# Patient Record
Sex: FEMALE | Race: WHITE | NOT HISPANIC OR LATINO | Employment: OTHER | URBAN - NONMETROPOLITAN AREA
[De-identification: names, ages, dates, MRNs, and addresses within clinical notes are randomized per-mention and may not be internally consistent; named-entity substitution may affect disease eponyms.]

---

## 2018-09-22 ENCOUNTER — APPOINTMENT (OUTPATIENT)
Dept: CT IMAGING | Facility: HOSPITAL | Age: 78
End: 2018-09-22

## 2018-09-22 ENCOUNTER — ANESTHESIA (OUTPATIENT)
Dept: PERIOP | Facility: HOSPITAL | Age: 78
End: 2018-09-22

## 2018-09-22 ENCOUNTER — ANESTHESIA EVENT (OUTPATIENT)
Dept: PERIOP | Facility: HOSPITAL | Age: 78
End: 2018-09-22

## 2018-09-22 ENCOUNTER — APPOINTMENT (OUTPATIENT)
Dept: GENERAL RADIOLOGY | Facility: HOSPITAL | Age: 78
End: 2018-09-22

## 2018-09-22 ENCOUNTER — HOSPITAL ENCOUNTER (INPATIENT)
Facility: HOSPITAL | Age: 78
LOS: 6 days | Discharge: HOME | End: 2018-09-28
Attending: EMERGENCY MEDICINE | Admitting: SURGERY

## 2018-09-22 DIAGNOSIS — K81.0 ACUTE CHOLECYSTITIS: Primary | ICD-10-CM

## 2018-09-22 LAB
ALBUMIN SERPL-MCNC: 4.3 G/DL (ref 3.4–4.8)
ALBUMIN/GLOB SERPL: 1.7 G/DL (ref 1.5–2.5)
ALP SERPL-CCNC: 68 U/L (ref 35–104)
ALT SERPL W P-5'-P-CCNC: 15 U/L (ref 10–36)
AMYLASE SERPL-CCNC: 113 U/L (ref 28–100)
ANION GAP SERPL CALCULATED.3IONS-SCNC: 10.5 MMOL/L (ref 3.6–11.2)
AST SERPL-CCNC: 25 U/L (ref 10–30)
BASOPHILS # BLD AUTO: 0.02 10*3/MM3 (ref 0–0.3)
BASOPHILS NFR BLD AUTO: 0.2 % (ref 0–2)
BILIRUB SERPL-MCNC: 1.5 MG/DL (ref 0.2–1.8)
BUN BLD-MCNC: 15 MG/DL (ref 7–21)
BUN/CREAT SERPL: 20 (ref 7–25)
CALCIUM SPEC-SCNC: 9.6 MG/DL (ref 7.7–10)
CHLORIDE SERPL-SCNC: 104 MMOL/L (ref 99–112)
CO2 SERPL-SCNC: 27.5 MMOL/L (ref 24.3–31.9)
CREAT BLD-MCNC: 0.75 MG/DL (ref 0.43–1.29)
D-LACTATE SERPL-SCNC: 1.1 MMOL/L (ref 0.5–2)
D-LACTATE SERPL-SCNC: 2.4 MMOL/L (ref 0.5–2)
DEPRECATED RDW RBC AUTO: 45.1 FL (ref 37–54)
EOSINOPHIL # BLD AUTO: 0.08 10*3/MM3 (ref 0–0.7)
EOSINOPHIL NFR BLD AUTO: 0.6 % (ref 0–7)
ERYTHROCYTE [DISTWIDTH] IN BLOOD BY AUTOMATED COUNT: 13.3 % (ref 11.5–14.5)
GFR SERPL CREATININE-BSD FRML MDRD: 74 ML/MIN/1.73
GLOBULIN UR ELPH-MCNC: 2.6 GM/DL
GLUCOSE BLD-MCNC: 139 MG/DL (ref 70–110)
HCT VFR BLD AUTO: 45.6 % (ref 37–47)
HGB BLD-MCNC: 14.8 G/DL (ref 12–16)
HOLD SPECIMEN: NORMAL
IMM GRANULOCYTES # BLD: 0.03 10*3/MM3 (ref 0–0.03)
IMM GRANULOCYTES NFR BLD: 0.2 % (ref 0–0.5)
LIPASE SERPL-CCNC: 60 U/L (ref 13–60)
LYMPHOCYTES # BLD AUTO: 2.6 10*3/MM3 (ref 1–3)
LYMPHOCYTES NFR BLD AUTO: 20 % (ref 16–46)
MAGNESIUM SERPL-MCNC: 1.9 MG/DL (ref 1.7–2.6)
MCH RBC QN AUTO: 30.9 PG (ref 27–33)
MCHC RBC AUTO-ENTMCNC: 32.5 G/DL (ref 33–37)
MCV RBC AUTO: 95.2 FL (ref 80–94)
MONOCYTES # BLD AUTO: 0.46 10*3/MM3 (ref 0.1–0.9)
MONOCYTES NFR BLD AUTO: 3.5 % (ref 0–12)
NEUTROPHILS # BLD AUTO: 9.83 10*3/MM3 (ref 1.4–6.5)
NEUTROPHILS NFR BLD AUTO: 75.5 % (ref 40–75)
OSMOLALITY SERPL CALC.SUM OF ELEC: 286.2 MOSM/KG (ref 273–305)
PLATELET # BLD AUTO: 242 10*3/MM3 (ref 130–400)
PMV BLD AUTO: 11.7 FL (ref 6–10)
POTASSIUM BLD-SCNC: 3.4 MMOL/L (ref 3.5–5.3)
PROT SERPL-MCNC: 6.9 G/DL (ref 6–8)
RBC # BLD AUTO: 4.79 10*6/MM3 (ref 4.2–5.4)
SODIUM BLD-SCNC: 142 MMOL/L (ref 135–153)
T4 FREE SERPL-MCNC: 1.28 NG/DL (ref 0.89–1.76)
TROPONIN I SERPL-MCNC: <0.006 NG/ML
TSH SERPL DL<=0.05 MIU/L-ACNC: 5.54 MIU/ML (ref 0.55–4.78)
WBC NRBC COR # BLD: 13.02 10*3/MM3 (ref 4.5–12.5)

## 2018-09-22 PROCEDURE — 71045 X-RAY EXAM CHEST 1 VIEW: CPT

## 2018-09-22 PROCEDURE — 87040 BLOOD CULTURE FOR BACTERIA: CPT | Performed by: EMERGENCY MEDICINE

## 2018-09-22 PROCEDURE — 88304 TISSUE EXAM BY PATHOLOGIST: CPT | Performed by: SURGERY

## 2018-09-22 PROCEDURE — 80053 COMPREHEN METABOLIC PANEL: CPT | Performed by: EMERGENCY MEDICINE

## 2018-09-22 PROCEDURE — 99223 1ST HOSP IP/OBS HIGH 75: CPT | Performed by: SURGERY

## 2018-09-22 PROCEDURE — 99285 EMERGENCY DEPT VISIT HI MDM: CPT

## 2018-09-22 PROCEDURE — 93005 ELECTROCARDIOGRAM TRACING: CPT | Performed by: EMERGENCY MEDICINE

## 2018-09-22 PROCEDURE — 84484 ASSAY OF TROPONIN QUANT: CPT | Performed by: EMERGENCY MEDICINE

## 2018-09-22 PROCEDURE — 25010000002 NEOSTIGMINE 10 MG/10ML SOLUTION: Performed by: NURSE ANESTHETIST, CERTIFIED REGISTERED

## 2018-09-22 PROCEDURE — 25010000002 ONDANSETRON PER 1 MG: Performed by: NURSE ANESTHETIST, CERTIFIED REGISTERED

## 2018-09-22 PROCEDURE — 83605 ASSAY OF LACTIC ACID: CPT | Performed by: EMERGENCY MEDICINE

## 2018-09-22 PROCEDURE — 74177 CT ABD & PELVIS W/CONTRAST: CPT

## 2018-09-22 PROCEDURE — 25010000002 MORPHINE PER 10 MG: Performed by: EMERGENCY MEDICINE

## 2018-09-22 PROCEDURE — 85025 COMPLETE CBC W/AUTO DIFF WBC: CPT | Performed by: EMERGENCY MEDICINE

## 2018-09-22 PROCEDURE — 74177 CT ABD & PELVIS W/CONTRAST: CPT | Performed by: RADIOLOGY

## 2018-09-22 PROCEDURE — 71045 X-RAY EXAM CHEST 1 VIEW: CPT | Performed by: RADIOLOGY

## 2018-09-22 PROCEDURE — 25010000002 HYDROMORPHONE PER 4 MG

## 2018-09-22 PROCEDURE — 0FT44ZZ RESECTION OF GALLBLADDER, PERCUTANEOUS ENDOSCOPIC APPROACH: ICD-10-PCS | Performed by: SURGERY

## 2018-09-22 PROCEDURE — 71275 CT ANGIOGRAPHY CHEST: CPT | Performed by: RADIOLOGY

## 2018-09-22 PROCEDURE — 83735 ASSAY OF MAGNESIUM: CPT | Performed by: EMERGENCY MEDICINE

## 2018-09-22 PROCEDURE — 25010000002 PROMETHAZINE PER 50 MG: Performed by: EMERGENCY MEDICINE

## 2018-09-22 PROCEDURE — 47562 LAPAROSCOPIC CHOLECYSTECTOMY: CPT | Performed by: SURGERY

## 2018-09-22 PROCEDURE — 71275 CT ANGIOGRAPHY CHEST: CPT

## 2018-09-22 PROCEDURE — 84443 ASSAY THYROID STIM HORMONE: CPT | Performed by: EMERGENCY MEDICINE

## 2018-09-22 PROCEDURE — 84439 ASSAY OF FREE THYROXINE: CPT | Performed by: EMERGENCY MEDICINE

## 2018-09-22 PROCEDURE — 25010000002 FENTANYL CITRATE (PF) 100 MCG/2ML SOLUTION: Performed by: NURSE ANESTHETIST, CERTIFIED REGISTERED

## 2018-09-22 PROCEDURE — 82150 ASSAY OF AMYLASE: CPT | Performed by: EMERGENCY MEDICINE

## 2018-09-22 PROCEDURE — 25010000002 ONDANSETRON PER 1 MG: Performed by: EMERGENCY MEDICINE

## 2018-09-22 PROCEDURE — 25010000002 PROPOFOL 10 MG/ML EMULSION: Performed by: NURSE ANESTHETIST, CERTIFIED REGISTERED

## 2018-09-22 PROCEDURE — 94799 UNLISTED PULMONARY SVC/PX: CPT

## 2018-09-22 PROCEDURE — 83690 ASSAY OF LIPASE: CPT | Performed by: EMERGENCY MEDICINE

## 2018-09-22 PROCEDURE — 0 IOPAMIDOL PER 1 ML: Performed by: EMERGENCY MEDICINE

## 2018-09-22 RX ORDER — METOPROLOL SUCCINATE 50 MG/1
100 TABLET, EXTENDED RELEASE ORAL DAILY
Status: DISCONTINUED | OUTPATIENT
Start: 2018-09-23 | End: 2018-09-28 | Stop reason: HOSPADM

## 2018-09-22 RX ORDER — HYDROCHLOROTHIAZIDE 25 MG/1
12.5 TABLET ORAL DAILY
COMMUNITY

## 2018-09-22 RX ORDER — ONDANSETRON 2 MG/ML
INJECTION INTRAMUSCULAR; INTRAVENOUS AS NEEDED
Status: DISCONTINUED | OUTPATIENT
Start: 2018-09-22 | End: 2018-09-22 | Stop reason: SURG

## 2018-09-22 RX ORDER — ROCURONIUM BROMIDE 10 MG/ML
INJECTION, SOLUTION INTRAVENOUS AS NEEDED
Status: DISCONTINUED | OUTPATIENT
Start: 2018-09-22 | End: 2018-09-22 | Stop reason: SURG

## 2018-09-22 RX ORDER — NEOSTIGMINE METHYLSULFATE 1 MG/ML
INJECTION, SOLUTION INTRAVENOUS AS NEEDED
Status: DISCONTINUED | OUTPATIENT
Start: 2018-09-22 | End: 2018-09-22 | Stop reason: SURG

## 2018-09-22 RX ORDER — MULTIPLE VITAMINS W/ MINERALS TAB 9MG-400MCG
1 TAB ORAL DAILY
COMMUNITY

## 2018-09-22 RX ORDER — SODIUM CHLORIDE, SODIUM LACTATE, POTASSIUM CHLORIDE, CALCIUM CHLORIDE 600; 310; 30; 20 MG/100ML; MG/100ML; MG/100ML; MG/100ML
100 INJECTION, SOLUTION INTRAVENOUS CONTINUOUS
Status: DISCONTINUED | OUTPATIENT
Start: 2018-09-22 | End: 2018-09-24

## 2018-09-22 RX ORDER — FENTANYL CITRATE 50 UG/ML
INJECTION, SOLUTION INTRAMUSCULAR; INTRAVENOUS AS NEEDED
Status: DISCONTINUED | OUTPATIENT
Start: 2018-09-22 | End: 2018-09-22 | Stop reason: SURG

## 2018-09-22 RX ORDER — NALOXONE HCL 0.4 MG/ML
0.4 VIAL (ML) INJECTION
Status: DISCONTINUED | OUTPATIENT
Start: 2018-09-22 | End: 2018-09-28 | Stop reason: HOSPADM

## 2018-09-22 RX ORDER — ATORVASTATIN CALCIUM 10 MG/1
10 TABLET, FILM COATED ORAL DAILY
Status: DISCONTINUED | OUTPATIENT
Start: 2018-09-23 | End: 2018-09-28 | Stop reason: HOSPADM

## 2018-09-22 RX ORDER — IBUPROFEN 800 MG/1
800 TABLET ORAL EVERY 4 HOURS PRN
Status: DISCONTINUED | OUTPATIENT
Start: 2018-09-22 | End: 2018-09-23

## 2018-09-22 RX ORDER — SODIUM CHLORIDE 0.9 % (FLUSH) 0.9 %
1-10 SYRINGE (ML) INJECTION AS NEEDED
Status: DISCONTINUED | OUTPATIENT
Start: 2018-09-22 | End: 2018-09-22 | Stop reason: HOSPADM

## 2018-09-22 RX ORDER — MORPHINE SULFATE 2 MG/ML
1 INJECTION, SOLUTION INTRAMUSCULAR; INTRAVENOUS EVERY 4 HOURS PRN
Status: DISCONTINUED | OUTPATIENT
Start: 2018-09-22 | End: 2018-09-28 | Stop reason: HOSPADM

## 2018-09-22 RX ORDER — IPRATROPIUM BROMIDE AND ALBUTEROL SULFATE 2.5; .5 MG/3ML; MG/3ML
3 SOLUTION RESPIRATORY (INHALATION) ONCE AS NEEDED
Status: DISCONTINUED | OUTPATIENT
Start: 2018-09-22 | End: 2018-09-22 | Stop reason: HOSPADM

## 2018-09-22 RX ORDER — LIDOCAINE HYDROCHLORIDE 20 MG/ML
INJECTION, SOLUTION EPIDURAL; INFILTRATION; INTRACAUDAL; PERINEURAL AS NEEDED
Status: DISCONTINUED | OUTPATIENT
Start: 2018-09-22 | End: 2018-09-22 | Stop reason: SURG

## 2018-09-22 RX ORDER — BUPIVACAINE HYDROCHLORIDE AND EPINEPHRINE 5; 5 MG/ML; UG/ML
INJECTION, SOLUTION EPIDURAL; INTRACAUDAL; PERINEURAL AS NEEDED
Status: DISCONTINUED | OUTPATIENT
Start: 2018-09-22 | End: 2018-09-22 | Stop reason: HOSPADM

## 2018-09-22 RX ORDER — FAMOTIDINE 10 MG/ML
INJECTION, SOLUTION INTRAVENOUS AS NEEDED
Status: DISCONTINUED | OUTPATIENT
Start: 2018-09-22 | End: 2018-09-22 | Stop reason: SURG

## 2018-09-22 RX ORDER — ONDANSETRON 2 MG/ML
4 INJECTION INTRAMUSCULAR; INTRAVENOUS ONCE
Status: COMPLETED | OUTPATIENT
Start: 2018-09-22 | End: 2018-09-22

## 2018-09-22 RX ORDER — HYDROMORPHONE HYDROCHLORIDE 1 MG/ML
0.5 INJECTION, SOLUTION INTRAMUSCULAR; INTRAVENOUS; SUBCUTANEOUS ONCE
Status: DISCONTINUED | OUTPATIENT
Start: 2018-09-22 | End: 2018-09-28 | Stop reason: HOSPADM

## 2018-09-22 RX ORDER — MORPHINE SULFATE 2 MG/ML
2 INJECTION, SOLUTION INTRAMUSCULAR; INTRAVENOUS ONCE
Status: COMPLETED | OUTPATIENT
Start: 2018-09-22 | End: 2018-09-22

## 2018-09-22 RX ORDER — MORPHINE SULFATE 2 MG/ML
2 INJECTION, SOLUTION INTRAMUSCULAR; INTRAVENOUS
Status: DISCONTINUED | OUTPATIENT
Start: 2018-09-22 | End: 2018-09-28 | Stop reason: HOSPADM

## 2018-09-22 RX ORDER — HYDROCODONE BITARTRATE AND ACETAMINOPHEN 5; 325 MG/1; MG/1
1 TABLET ORAL EVERY 6 HOURS PRN
Status: DISCONTINUED | OUTPATIENT
Start: 2018-09-22 | End: 2018-09-28 | Stop reason: HOSPADM

## 2018-09-22 RX ORDER — HYDROMORPHONE HCL 110MG/55ML
PATIENT CONTROLLED ANALGESIA SYRINGE INTRAVENOUS
Status: COMPLETED
Start: 2018-09-22 | End: 2018-09-22

## 2018-09-22 RX ORDER — ONDANSETRON 2 MG/ML
4 INJECTION INTRAMUSCULAR; INTRAVENOUS EVERY 6 HOURS PRN
Status: DISCONTINUED | OUTPATIENT
Start: 2018-09-22 | End: 2018-09-24

## 2018-09-22 RX ORDER — MEPERIDINE HYDROCHLORIDE 50 MG/ML
12.5 INJECTION INTRAMUSCULAR; INTRAVENOUS; SUBCUTANEOUS
Status: DISCONTINUED | OUTPATIENT
Start: 2018-09-22 | End: 2018-09-22 | Stop reason: HOSPADM

## 2018-09-22 RX ORDER — OXYCODONE HYDROCHLORIDE AND ACETAMINOPHEN 5; 325 MG/1; MG/1
1 TABLET ORAL ONCE AS NEEDED
Status: DISCONTINUED | OUTPATIENT
Start: 2018-09-22 | End: 2018-09-22 | Stop reason: HOSPADM

## 2018-09-22 RX ORDER — FENTANYL CITRATE 50 UG/ML
50 INJECTION, SOLUTION INTRAMUSCULAR; INTRAVENOUS
Status: DISCONTINUED | OUTPATIENT
Start: 2018-09-22 | End: 2018-09-22 | Stop reason: HOSPADM

## 2018-09-22 RX ORDER — METOPROLOL SUCCINATE 100 MG/1
100 TABLET, EXTENDED RELEASE ORAL DAILY
COMMUNITY

## 2018-09-22 RX ORDER — SODIUM CHLORIDE 0.9 % (FLUSH) 0.9 %
1-10 SYRINGE (ML) INJECTION AS NEEDED
Status: DISCONTINUED | OUTPATIENT
Start: 2018-09-22 | End: 2018-09-28 | Stop reason: HOSPADM

## 2018-09-22 RX ORDER — PRAVASTATIN SODIUM 40 MG
40 TABLET ORAL NIGHTLY
COMMUNITY

## 2018-09-22 RX ORDER — PROPOFOL 10 MG/ML
VIAL (ML) INTRAVENOUS AS NEEDED
Status: DISCONTINUED | OUTPATIENT
Start: 2018-09-22 | End: 2018-09-22 | Stop reason: SURG

## 2018-09-22 RX ORDER — MAGNESIUM HYDROXIDE 1200 MG/15ML
LIQUID ORAL AS NEEDED
Status: DISCONTINUED | OUTPATIENT
Start: 2018-09-22 | End: 2018-09-22 | Stop reason: HOSPADM

## 2018-09-22 RX ORDER — SODIUM CHLORIDE, SODIUM LACTATE, POTASSIUM CHLORIDE, CALCIUM CHLORIDE 600; 310; 30; 20 MG/100ML; MG/100ML; MG/100ML; MG/100ML
125 INJECTION, SOLUTION INTRAVENOUS CONTINUOUS
Status: DISCONTINUED | OUTPATIENT
Start: 2018-09-22 | End: 2018-09-24

## 2018-09-22 RX ORDER — HYDROCHLOROTHIAZIDE 25 MG/1
12.5 TABLET ORAL DAILY
Status: DISCONTINUED | OUTPATIENT
Start: 2018-09-23 | End: 2018-09-28 | Stop reason: HOSPADM

## 2018-09-22 RX ORDER — MELATONIN
1400 DAILY
COMMUNITY

## 2018-09-22 RX ORDER — SODIUM CHLORIDE 9 MG/ML
INJECTION, SOLUTION INTRAVENOUS AS NEEDED
Status: DISCONTINUED | OUTPATIENT
Start: 2018-09-22 | End: 2018-09-22 | Stop reason: HOSPADM

## 2018-09-22 RX ORDER — SODIUM CHLORIDE 9 MG/ML
125 INJECTION, SOLUTION INTRAVENOUS CONTINUOUS
Status: DISCONTINUED | OUTPATIENT
Start: 2018-09-22 | End: 2018-09-24

## 2018-09-22 RX ORDER — PANTOPRAZOLE SODIUM 40 MG/10ML
40 INJECTION, POWDER, LYOPHILIZED, FOR SOLUTION INTRAVENOUS ONCE
Status: COMPLETED | OUTPATIENT
Start: 2018-09-22 | End: 2018-09-22

## 2018-09-22 RX ORDER — MORPHINE SULFATE 2 MG/ML
2 INJECTION, SOLUTION INTRAMUSCULAR; INTRAVENOUS ONCE
Status: DISCONTINUED | OUTPATIENT
Start: 2018-09-22 | End: 2018-09-22

## 2018-09-22 RX ADMIN — SODIUM CHLORIDE, POTASSIUM CHLORIDE, SODIUM LACTATE AND CALCIUM CHLORIDE 125 ML/HR: 600; 310; 30; 20 INJECTION, SOLUTION INTRAVENOUS at 23:40

## 2018-09-22 RX ADMIN — IOPAMIDOL 100 ML: 755 INJECTION, SOLUTION INTRAVENOUS at 17:59

## 2018-09-22 RX ADMIN — PANTOPRAZOLE SODIUM 40 MG: 40 INJECTION, POWDER, FOR SOLUTION INTRAVENOUS at 15:58

## 2018-09-22 RX ADMIN — FAMOTIDINE 20 MG: 10 INJECTION, SOLUTION INTRAVENOUS at 21:26

## 2018-09-22 RX ADMIN — PROPOFOL 120 MG: 10 INJECTION, EMULSION INTRAVENOUS at 21:32

## 2018-09-22 RX ADMIN — SODIUM CHLORIDE 125 ML/HR: 9 INJECTION, SOLUTION INTRAVENOUS at 15:58

## 2018-09-22 RX ADMIN — SODIUM CHLORIDE 500 ML: 9 INJECTION, SOLUTION INTRAVENOUS at 18:03

## 2018-09-22 RX ADMIN — ONDANSETRON 4 MG: 2 INJECTION INTRAMUSCULAR; INTRAVENOUS at 15:58

## 2018-09-22 RX ADMIN — LIDOCAINE HYDROCHLORIDE 100 MG: 20 INJECTION, SOLUTION EPIDURAL; INFILTRATION; INTRACAUDAL; PERINEURAL at 21:32

## 2018-09-22 RX ADMIN — ROCURONIUM BROMIDE 20 MG: 10 INJECTION INTRAVENOUS at 21:32

## 2018-09-22 RX ADMIN — PROMETHAZINE HYDROCHLORIDE 6.25 MG: 25 INJECTION INTRAMUSCULAR; INTRAVENOUS at 17:24

## 2018-09-22 RX ADMIN — SODIUM CHLORIDE, POTASSIUM CHLORIDE, SODIUM LACTATE AND CALCIUM CHLORIDE 100 ML/HR: 600; 310; 30; 20 INJECTION, SOLUTION INTRAVENOUS at 20:33

## 2018-09-22 RX ADMIN — METRONIDAZOLE 500 MG: 500 INJECTION, SOLUTION INTRAVENOUS at 19:04

## 2018-09-22 RX ADMIN — HYDROMORPHONE HYDROCHLORIDE 0.5 MG: 2 INJECTION, SOLUTION INTRAMUSCULAR; INTRAVENOUS; SUBCUTANEOUS at 17:24

## 2018-09-22 RX ADMIN — FENTANYL CITRATE 50 MCG: 50 INJECTION INTRAMUSCULAR; INTRAVENOUS at 21:34

## 2018-09-22 RX ADMIN — FENTANYL CITRATE 50 MCG: 50 INJECTION INTRAMUSCULAR; INTRAVENOUS at 21:42

## 2018-09-22 RX ADMIN — ONDANSETRON 4 MG: 2 INJECTION, SOLUTION INTRAMUSCULAR; INTRAVENOUS at 21:26

## 2018-09-22 RX ADMIN — MORPHINE SULFATE 2 MG: 2 INJECTION, SOLUTION INTRAMUSCULAR; INTRAVENOUS at 15:58

## 2018-09-22 RX ADMIN — NEOSTIGMINE METHYLSULFATE 3 MG: 1 INJECTION, SOLUTION INTRAVENOUS at 22:21

## 2018-09-23 LAB
ALBUMIN SERPL-MCNC: 3.3 G/DL (ref 3.4–4.8)
ALBUMIN/GLOB SERPL: 1.4 G/DL (ref 1.5–2.5)
ALP SERPL-CCNC: 49 U/L (ref 35–104)
ALT SERPL W P-5'-P-CCNC: 26 U/L (ref 10–36)
ANION GAP SERPL CALCULATED.3IONS-SCNC: 6.3 MMOL/L (ref 3.6–11.2)
AST SERPL-CCNC: 35 U/L (ref 10–30)
BACTERIA UR QL AUTO: ABNORMAL /HPF
BILIRUB SERPL-MCNC: 2 MG/DL (ref 0.2–1.8)
BILIRUB UR QL STRIP: NEGATIVE
BUN BLD-MCNC: 10 MG/DL (ref 7–21)
BUN/CREAT SERPL: 14.5 (ref 7–25)
CALCIUM SPEC-SCNC: 8.1 MG/DL (ref 7.7–10)
CHLORIDE SERPL-SCNC: 107 MMOL/L (ref 99–112)
CLARITY UR: CLEAR
CO2 SERPL-SCNC: 26.7 MMOL/L (ref 24.3–31.9)
COLOR UR: YELLOW
CREAT BLD-MCNC: 0.69 MG/DL (ref 0.43–1.29)
GFR SERPL CREATININE-BSD FRML MDRD: 82 ML/MIN/1.73
GLOBULIN UR ELPH-MCNC: 2.4 GM/DL
GLUCOSE BLD-MCNC: 155 MG/DL (ref 70–110)
GLUCOSE UR STRIP-MCNC: NEGATIVE MG/DL
HGB UR QL STRIP.AUTO: NEGATIVE
HYALINE CASTS UR QL AUTO: ABNORMAL /LPF
KETONES UR QL STRIP: NEGATIVE
LEUKOCYTE ESTERASE UR QL STRIP.AUTO: ABNORMAL
NITRITE UR QL STRIP: NEGATIVE
OSMOLALITY SERPL CALC.SUM OF ELEC: 281.6 MOSM/KG (ref 273–305)
PH UR STRIP.AUTO: <=5 [PH] (ref 5–8)
POTASSIUM BLD-SCNC: 3.4 MMOL/L (ref 3.5–5.3)
PROT SERPL-MCNC: 5.7 G/DL (ref 6–8)
PROT UR QL STRIP: NEGATIVE
RBC # UR: ABNORMAL /HPF
REF LAB TEST METHOD: ABNORMAL
SODIUM BLD-SCNC: 140 MMOL/L (ref 135–153)
SP GR UR STRIP: 1.01 (ref 1–1.03)
SQUAMOUS #/AREA URNS HPF: ABNORMAL /HPF
UROBILINOGEN UR QL STRIP: ABNORMAL
WBC UR QL AUTO: ABNORMAL /HPF

## 2018-09-23 PROCEDURE — 81001 URINALYSIS AUTO W/SCOPE: CPT | Performed by: EMERGENCY MEDICINE

## 2018-09-23 PROCEDURE — 99024 POSTOP FOLLOW-UP VISIT: CPT | Performed by: SURGERY

## 2018-09-23 PROCEDURE — 80053 COMPREHEN METABOLIC PANEL: CPT | Performed by: SURGERY

## 2018-09-23 RX ORDER — ACETAMINOPHEN 325 MG/1
650 TABLET ORAL EVERY 6 HOURS PRN
Status: DISCONTINUED | OUTPATIENT
Start: 2018-09-23 | End: 2018-09-28 | Stop reason: HOSPADM

## 2018-09-23 RX ADMIN — HYDROCODONE BITARTRATE AND ACETAMINOPHEN 1 TABLET: 5; 325 TABLET ORAL at 18:21

## 2018-09-23 RX ADMIN — METRONIDAZOLE 500 MG: 500 INJECTION, SOLUTION INTRAVENOUS at 10:58

## 2018-09-23 RX ADMIN — SODIUM CHLORIDE, POTASSIUM CHLORIDE, SODIUM LACTATE AND CALCIUM CHLORIDE 125 ML/HR: 600; 310; 30; 20 INJECTION, SOLUTION INTRAVENOUS at 02:40

## 2018-09-23 RX ADMIN — SODIUM CHLORIDE, POTASSIUM CHLORIDE, SODIUM LACTATE AND CALCIUM CHLORIDE 125 ML/HR: 600; 310; 30; 20 INJECTION, SOLUTION INTRAVENOUS at 10:58

## 2018-09-23 RX ADMIN — SODIUM CHLORIDE, POTASSIUM CHLORIDE, SODIUM LACTATE AND CALCIUM CHLORIDE 100 ML/HR: 600; 310; 30; 20 INJECTION, SOLUTION INTRAVENOUS at 21:56

## 2018-09-23 RX ADMIN — HYDROCHLOROTHIAZIDE 12.5 MG: 25 TABLET ORAL at 08:13

## 2018-09-23 RX ADMIN — METRONIDAZOLE 500 MG: 500 INJECTION, SOLUTION INTRAVENOUS at 18:10

## 2018-09-23 RX ADMIN — METOPROLOL SUCCINATE 100 MG: 50 TABLET, FILM COATED, EXTENDED RELEASE ORAL at 08:14

## 2018-09-23 RX ADMIN — METRONIDAZOLE 500 MG: 500 INJECTION, SOLUTION INTRAVENOUS at 02:40

## 2018-09-23 RX ADMIN — ATORVASTATIN CALCIUM 10 MG: 10 TABLET, FILM COATED ORAL at 08:14

## 2018-09-23 NOTE — ANESTHESIA POSTPROCEDURE EVALUATION
Patient: Dyana Nails    Procedure Summary     Date:  09/22/18 Room / Location:  [facility] OR  /  [facility] OR    Anesthesia Start:  2126 Anesthesia Stop:  2227    Procedure:  CHOLECYSTECTOMY LAPAROSCOPIC (N/A Abdomen) Diagnosis:       Acute cholecystitis      (Acute cholecystitis [K81.0])    Surgeon:  Jerzy Bowie MD Provider:  Phi Burgos MD    Anesthesia Type:  general ASA Status:  2          Anesthesia Type: general  Last vitals  BP   140/72 (09/22/18 2243)   Temp   97.8 °F (36.6 °C) (09/22/18 2228)   Pulse   85 (09/22/18 2243)   Resp   14 (09/22/18 2243)     SpO2   95 % (09/22/18 2243)     Post Anesthesia Care and Evaluation    Patient location during evaluation: PACU  Patient participation: complete - patient participated  Level of consciousness: awake and alert  Pain score: 1  Pain management: adequate  Airway patency: patent  Anesthetic complications: No anesthetic complications  PONV Status: controlled  Cardiovascular status: acceptable  Respiratory status: acceptable  Hydration status: acceptable

## 2018-09-23 NOTE — ANESTHESIA PREPROCEDURE EVALUATION
Anesthesia Evaluation     Patient summary reviewed and Nursing notes reviewed   no history of anesthetic complications:  NPO Solid Status: > 8 hours  NPO Liquid Status: > 8 hours           Airway   Mallampati: II  TM distance: >3 FB  Neck ROM: full  No difficulty expected  Dental - normal exam   (+) lower dentures and upper dentures    Pulmonary - normal exam   (+) a smoker Former,   Cardiovascular - normal exam  Exercise tolerance: good (4-7 METS)    ECG reviewed  Patient on routine beta blocker and Beta blocker given within 24 hours of surgery    (+) hypertension, past MI  >12 months, hyperlipidemia,     ROS comment: EKG NSR, sees cardiologist (Dr. Armenta in [place], last saw in June 2018), good functional capacity, able to walk several flights of stairs without chest pain or SOB    Neuro/Psych- negative ROS  GI/Hepatic/Renal/Endo - negative ROS     Musculoskeletal (-) negative ROS    Abdominal  - normal exam    Bowel sounds: normal.   Substance History - negative use     OB/GYN negative ob/gyn ROS         Other                      Anesthesia Plan    ASA 2     general     intravenous induction   Anesthetic plan, all risks, benefits, and alternatives have been provided, discussed and informed consent has been obtained with: patient.    Plan discussed with CRNA.

## 2018-09-23 NOTE — ANESTHESIA PROCEDURE NOTES
Airway  Urgency: elective    Date/Time: 9/22/2018 9:26 PM  Airway not difficult    General Information and Staff    Patient location during procedure: OR  Anesthesiologist: OTILIA BROWN  CRNA: ARLINE AGARWAL    Indications and Patient Condition  Indications for airway management: airway protection    Preoxygenated: yes  MILS not maintained throughout  Mask difficulty assessment: 0 - not attempted    Final Airway Details  Final airway type: endotracheal airway      Successful airway: ETT  Cuffed: yes   Successful intubation technique: direct laryngoscopy  Endotracheal tube insertion site: oral  Blade: [place]y  Blade size: 2  ETT size: 7.5 mm  Cormack-Lehane Classification: grade I - full view of glottis  Placement verified by: chest auscultation and capnometry   Measured from: lips  ETT to lips (cm): 22  Number of attempts at approach: 1    Additional Comments  Atraumatic ETT placement, dentition unchanged.

## 2018-09-24 LAB
ALBUMIN SERPL-MCNC: 3 G/DL (ref 3.4–4.8)
ALBUMIN/GLOB SERPL: 1.7 G/DL (ref 1.5–2.5)
ALP SERPL-CCNC: 54 U/L (ref 35–104)
ALT SERPL W P-5'-P-CCNC: 24 U/L (ref 10–36)
ANION GAP SERPL CALCULATED.3IONS-SCNC: 4.4 MMOL/L (ref 3.6–11.2)
APTT PPP: 35.2 SECONDS (ref 23.8–36.1)
AST SERPL-CCNC: 30 U/L (ref 10–30)
BASOPHILS # BLD AUTO: 0.01 10*3/MM3 (ref 0–0.3)
BASOPHILS NFR BLD AUTO: 0.1 % (ref 0–2)
BILIRUB CONJ SERPL-MCNC: 0.9 MG/DL (ref 0–0.2)
BILIRUB INDIRECT SERPL-MCNC: 1.5 MG/DL
BILIRUB SERPL-MCNC: 2.2 MG/DL (ref 0.2–1.8)
BILIRUB SERPL-MCNC: 2.4 MG/DL (ref 0.2–1.8)
BUN BLD-MCNC: 11 MG/DL (ref 7–21)
BUN/CREAT SERPL: 18.6 (ref 7–25)
CALCIUM SPEC-SCNC: 7.7 MG/DL (ref 7.7–10)
CHLORIDE SERPL-SCNC: 106 MMOL/L (ref 99–112)
CK MB SERPL-CCNC: 8.44 NG/ML (ref 0–5)
CK MB SERPL-RTO: 3.6 % (ref 0–3)
CK SERPL-CCNC: 233 U/L (ref 24–173)
CO2 SERPL-SCNC: 28.6 MMOL/L (ref 24.3–31.9)
CREAT BLD-MCNC: 0.59 MG/DL (ref 0.43–1.29)
DEPRECATED RDW RBC AUTO: 49.9 FL (ref 37–54)
EOSINOPHIL # BLD AUTO: 0.02 10*3/MM3 (ref 0–0.7)
EOSINOPHIL NFR BLD AUTO: 0.1 % (ref 0–7)
ERYTHROCYTE [DISTWIDTH] IN BLOOD BY AUTOMATED COUNT: 14.1 % (ref 11.5–14.5)
GFR SERPL CREATININE-BSD FRML MDRD: 98 ML/MIN/1.73
GLOBULIN UR ELPH-MCNC: 1.8 GM/DL
GLUCOSE BLD-MCNC: 103 MG/DL (ref 70–110)
HCT VFR BLD AUTO: 42.6 % (ref 37–47)
HGB BLD-MCNC: 13.3 G/DL (ref 12–16)
IMM GRANULOCYTES # BLD: 0.04 10*3/MM3 (ref 0–0.03)
IMM GRANULOCYTES NFR BLD: 0.2 % (ref 0–0.5)
INR PPP: 1.45 (ref 0.9–1.1)
LYMPHOCYTES # BLD AUTO: 1.12 10*3/MM3 (ref 1–3)
LYMPHOCYTES NFR BLD AUTO: 6.7 % (ref 16–46)
MCH RBC QN AUTO: 30.9 PG (ref 27–33)
MCHC RBC AUTO-ENTMCNC: 31.2 G/DL (ref 33–37)
MCV RBC AUTO: 98.8 FL (ref 80–94)
MONOCYTES # BLD AUTO: 0.96 10*3/MM3 (ref 0.1–0.9)
MONOCYTES NFR BLD AUTO: 5.8 % (ref 0–12)
MYOGLOBIN SERPL-MCNC: 379 NG/ML (ref 0–109)
NEUTROPHILS # BLD AUTO: 14.46 10*3/MM3 (ref 1.4–6.5)
NEUTROPHILS NFR BLD AUTO: 87.1 % (ref 40–75)
OSMOLALITY SERPL CALC.SUM OF ELEC: 277.2 MOSM/KG (ref 273–305)
PLATELET # BLD AUTO: 228 10*3/MM3 (ref 130–400)
PMV BLD AUTO: 12.3 FL (ref 6–10)
POTASSIUM BLD-SCNC: 3.4 MMOL/L (ref 3.5–5.3)
PROT SERPL-MCNC: 4.8 G/DL (ref 6–8)
PROTHROMBIN TIME: 17.9 SECONDS (ref 11–15.4)
RBC # BLD AUTO: 4.31 10*6/MM3 (ref 4.2–5.4)
SODIUM BLD-SCNC: 139 MMOL/L (ref 135–153)
TROPONIN I SERPL-MCNC: 0.07 NG/ML
WBC NRBC COR # BLD: 16.61 10*3/MM3 (ref 4.5–12.5)

## 2018-09-24 PROCEDURE — 94799 UNLISTED PULMONARY SVC/PX: CPT

## 2018-09-24 PROCEDURE — 82550 ASSAY OF CK (CPK): CPT | Performed by: SURGERY

## 2018-09-24 PROCEDURE — 99222 1ST HOSP IP/OBS MODERATE 55: CPT | Performed by: INTERNAL MEDICINE

## 2018-09-24 PROCEDURE — 99024 POSTOP FOLLOW-UP VISIT: CPT | Performed by: SURGERY

## 2018-09-24 PROCEDURE — 25010000002 ONDANSETRON PER 1 MG: Performed by: SURGERY

## 2018-09-24 PROCEDURE — 25010000002 DIGOXIN PER 500 MCG: Performed by: INTERNAL MEDICINE

## 2018-09-24 PROCEDURE — 82247 BILIRUBIN TOTAL: CPT | Performed by: SURGERY

## 2018-09-24 PROCEDURE — 85610 PROTHROMBIN TIME: CPT | Performed by: INTERNAL MEDICINE

## 2018-09-24 PROCEDURE — 82248 BILIRUBIN DIRECT: CPT | Performed by: SURGERY

## 2018-09-24 PROCEDURE — 93010 ELECTROCARDIOGRAM REPORT: CPT | Performed by: INTERNAL MEDICINE

## 2018-09-24 PROCEDURE — 84484 ASSAY OF TROPONIN QUANT: CPT | Performed by: SURGERY

## 2018-09-24 PROCEDURE — 85025 COMPLETE CBC W/AUTO DIFF WBC: CPT | Performed by: INTERNAL MEDICINE

## 2018-09-24 PROCEDURE — 93005 ELECTROCARDIOGRAM TRACING: CPT | Performed by: SURGERY

## 2018-09-24 PROCEDURE — 80053 COMPREHEN METABOLIC PANEL: CPT | Performed by: SURGERY

## 2018-09-24 PROCEDURE — 83874 ASSAY OF MYOGLOBIN: CPT | Performed by: SURGERY

## 2018-09-24 PROCEDURE — 85730 THROMBOPLASTIN TIME PARTIAL: CPT | Performed by: INTERNAL MEDICINE

## 2018-09-24 PROCEDURE — 82553 CREATINE MB FRACTION: CPT | Performed by: SURGERY

## 2018-09-24 RX ORDER — DIGOXIN 0.25 MG/ML
250 INJECTION INTRAMUSCULAR; INTRAVENOUS ONCE
Status: COMPLETED | OUTPATIENT
Start: 2018-09-24 | End: 2018-09-24

## 2018-09-24 RX ORDER — HEPARIN SODIUM 5000 [USP'U]/ML
30 INJECTION, SOLUTION INTRAVENOUS; SUBCUTANEOUS AS NEEDED
Status: DISCONTINUED | OUTPATIENT
Start: 2018-09-24 | End: 2018-09-26

## 2018-09-24 RX ORDER — HEPARIN SODIUM 10000 [USP'U]/100ML
12 INJECTION, SOLUTION INTRAVENOUS
Status: DISCONTINUED | OUTPATIENT
Start: 2018-09-24 | End: 2018-09-26

## 2018-09-24 RX ORDER — HEPARIN SODIUM 5000 [USP'U]/ML
60 INJECTION, SOLUTION INTRAVENOUS; SUBCUTANEOUS AS NEEDED
Status: DISCONTINUED | OUTPATIENT
Start: 2018-09-24 | End: 2018-09-26

## 2018-09-24 RX ORDER — ONDANSETRON 2 MG/ML
4 INJECTION INTRAMUSCULAR; INTRAVENOUS EVERY 4 HOURS PRN
Status: DISCONTINUED | OUTPATIENT
Start: 2018-09-24 | End: 2018-09-28 | Stop reason: HOSPADM

## 2018-09-24 RX ADMIN — DIGOXIN 250 MCG: 0.25 INJECTION INTRAMUSCULAR; INTRAVENOUS at 21:26

## 2018-09-24 RX ADMIN — HYDROCHLOROTHIAZIDE 12.5 MG: 25 TABLET ORAL at 08:24

## 2018-09-24 RX ADMIN — ONDANSETRON 4 MG: 2 INJECTION INTRAMUSCULAR; INTRAVENOUS at 16:35

## 2018-09-24 RX ADMIN — METRONIDAZOLE 500 MG: 500 INJECTION, SOLUTION INTRAVENOUS at 18:09

## 2018-09-24 RX ADMIN — METRONIDAZOLE 500 MG: 500 INJECTION, SOLUTION INTRAVENOUS at 11:13

## 2018-09-24 RX ADMIN — DIGOXIN 250 MCG: 0.25 INJECTION INTRAMUSCULAR; INTRAVENOUS at 22:02

## 2018-09-24 RX ADMIN — METRONIDAZOLE 500 MG: 500 INJECTION, SOLUTION INTRAVENOUS at 02:49

## 2018-09-24 RX ADMIN — ATORVASTATIN CALCIUM 10 MG: 10 TABLET, FILM COATED ORAL at 08:24

## 2018-09-24 RX ADMIN — ACETAMINOPHEN 650 MG: 325 TABLET ORAL at 08:52

## 2018-09-24 RX ADMIN — DILTIAZEM HYDROCHLORIDE 5 MG/HR: 5 INJECTION INTRAVENOUS at 20:28

## 2018-09-24 RX ADMIN — METOPROLOL SUCCINATE 100 MG: 50 TABLET, FILM COATED, EXTENDED RELEASE ORAL at 08:23

## 2018-09-25 ENCOUNTER — APPOINTMENT (OUTPATIENT)
Dept: CARDIOLOGY | Facility: HOSPITAL | Age: 78
End: 2018-09-25
Attending: INTERNAL MEDICINE

## 2018-09-25 LAB
ALBUMIN SERPL-MCNC: 3.1 G/DL (ref 3.4–4.8)
ALBUMIN/GLOB SERPL: 1.3 G/DL (ref 1.5–2.5)
ALP SERPL-CCNC: 92 U/L (ref 35–104)
ALT SERPL W P-5'-P-CCNC: 21 U/L (ref 10–36)
ANION GAP SERPL CALCULATED.3IONS-SCNC: 11 MMOL/L (ref 3.6–11.2)
APTT PPP: 36.4 SECONDS (ref 23.8–36.1)
APTT PPP: 45.6 SECONDS (ref 23.8–36.1)
APTT PPP: 58 SECONDS (ref 23.8–36.1)
AST SERPL-CCNC: 27 U/L (ref 10–30)
BASOPHILS # BLD AUTO: 0.01 10*3/MM3 (ref 0–0.3)
BASOPHILS NFR BLD AUTO: 0.1 % (ref 0–2)
BH CV ECHO MEAS - AO MAX PG (FULL): 4 MMHG
BH CV ECHO MEAS - AO MAX PG: 8.6 MMHG
BH CV ECHO MEAS - AO MEAN PG (FULL): 2.8 MMHG
BH CV ECHO MEAS - AO MEAN PG: 5.2 MMHG
BH CV ECHO MEAS - AO ROOT AREA (BSA CORRECTED): 1.3
BH CV ECHO MEAS - AO ROOT AREA: 4.8 CM^2
BH CV ECHO MEAS - AO ROOT DIAM: 2.5 CM
BH CV ECHO MEAS - AO V2 MAX: 146.6 CM/SEC
BH CV ECHO MEAS - AO V2 MEAN: 110.7 CM/SEC
BH CV ECHO MEAS - AO V2 VTI: 24.2 CM
BH CV ECHO MEAS - BSA(HAYCOCK): 1.9 M^2
BH CV ECHO MEAS - BSA: 1.9 M^2
BH CV ECHO MEAS - BZI_BMI: 28.6 KILOGRAMS/M^2
BH CV ECHO MEAS - BZI_METRIC_HEIGHT: 165.1 CM
BH CV ECHO MEAS - BZI_METRIC_WEIGHT: 78 KG
BH CV ECHO MEAS - EDV(CUBED): 57.4 ML
BH CV ECHO MEAS - EDV(MOD-SP2): 38 ML
BH CV ECHO MEAS - EDV(MOD-SP4): 22 ML
BH CV ECHO MEAS - EDV(TEICH): 64.2 ML
BH CV ECHO MEAS - EF(CUBED): 65.1 %
BH CV ECHO MEAS - EF(MOD-SP2): 55.3 %
BH CV ECHO MEAS - EF(MOD-SP4): 40.9 %
BH CV ECHO MEAS - EF(TEICH): 57.3 %
BH CV ECHO MEAS - ESV(CUBED): 20 ML
BH CV ECHO MEAS - ESV(MOD-SP2): 17 ML
BH CV ECHO MEAS - ESV(MOD-SP4): 13 ML
BH CV ECHO MEAS - ESV(TEICH): 27.4 ML
BH CV ECHO MEAS - FS: 29.6 %
BH CV ECHO MEAS - IVS/LVPW: 0.81
BH CV ECHO MEAS - IVSD: 0.76 CM
BH CV ECHO MEAS - LA DIMENSION: 4.4 CM
BH CV ECHO MEAS - LA/AO: 1.4
BH CV ECHO MEAS - LV DIASTOLIC VOL/BSA (35-75): 11.9 ML/M^2
BH CV ECHO MEAS - LV MASS(C)D: 95 GRAMS
BH CV ECHO MEAS - LV MASS(C)DI: 51.2 GRAMS/M^2
BH CV ECHO MEAS - LV MAX PG: 4.6 MMHG
BH CV ECHO MEAS - LV MEAN PG: 2.4 MMHG
BH CV ECHO MEAS - LV SYSTOLIC VOL/BSA (12-30): 7 ML/M^2
BH CV ECHO MEAS - LV V1 MAX: 107.4 CM/SEC
BH CV ECHO MEAS - LV V1 MEAN: 72.5 CM/SEC
BH CV ECHO MEAS - LV V1 VTI: 17.6 CM
BH CV ECHO MEAS - LVIDD: 3.9 CM
BH CV ECHO MEAS - LVIDS: 2.7 CM
BH CV ECHO MEAS - LVLD AP2: 5.4 CM
BH CV ECHO MEAS - LVLD AP4: 5.3 CM
BH CV ECHO MEAS - LVLS AP2: 4.7 CM
BH CV ECHO MEAS - LVLS AP4: 4.6 CM
BH CV ECHO MEAS - LVPWD: 0.93 CM
BH CV ECHO MEAS - MV A MAX VEL: 92.8 CM/SEC
BH CV ECHO MEAS - MV E MAX VEL: 77.5 CM/SEC
BH CV ECHO MEAS - MV E/A: 0.84
BH CV ECHO MEAS - PA ACC SLOPE: 532.7 CM/SEC^2
BH CV ECHO MEAS - PA ACC TIME: 0.11 SEC
BH CV ECHO MEAS - PA MAX PG: 3.4 MMHG
BH CV ECHO MEAS - PA MEAN PG: 1.8 MMHG
BH CV ECHO MEAS - PA PR(ACCEL): 31.5 MMHG
BH CV ECHO MEAS - PA V2 MAX: 92.3 CM/SEC
BH CV ECHO MEAS - PA V2 MEAN: 62.6 CM/SEC
BH CV ECHO MEAS - PA V2 VTI: 14.5 CM
BH CV ECHO MEAS - RAP SYSTOLE: 10 MMHG
BH CV ECHO MEAS - RVDD: 3.1 CM
BH CV ECHO MEAS - RVSP: 42.5 MMHG
BH CV ECHO MEAS - SI(AO): 61.9 ML/M^2
BH CV ECHO MEAS - SI(CUBED): 20.1 ML/M^2
BH CV ECHO MEAS - SI(MOD-SP2): 11.3 ML/M^2
BH CV ECHO MEAS - SI(MOD-SP4): 4.9 ML/M^2
BH CV ECHO MEAS - SI(TEICH): 19.8 ML/M^2
BH CV ECHO MEAS - SV(AO): 114.8 ML
BH CV ECHO MEAS - SV(CUBED): 37.4 ML
BH CV ECHO MEAS - SV(MOD-SP2): 21 ML
BH CV ECHO MEAS - SV(MOD-SP4): 9 ML
BH CV ECHO MEAS - SV(TEICH): 36.8 ML
BH CV ECHO MEAS - TR MAX VEL: 285 CM/SEC
BILIRUB SERPL-MCNC: 1.7 MG/DL (ref 0.2–1.8)
BUN BLD-MCNC: 14 MG/DL (ref 7–21)
BUN/CREAT SERPL: 23.7 (ref 7–25)
CALCIUM SPEC-SCNC: 8.3 MG/DL (ref 7.7–10)
CHLORIDE SERPL-SCNC: 104 MMOL/L (ref 99–112)
CO2 SERPL-SCNC: 23 MMOL/L (ref 24.3–31.9)
CREAT BLD-MCNC: 0.59 MG/DL (ref 0.43–1.29)
DEPRECATED RDW RBC AUTO: 49.8 FL (ref 37–54)
EOSINOPHIL # BLD AUTO: 0.1 10*3/MM3 (ref 0–0.7)
EOSINOPHIL NFR BLD AUTO: 0.6 % (ref 0–7)
ERYTHROCYTE [DISTWIDTH] IN BLOOD BY AUTOMATED COUNT: 14.1 % (ref 11.5–14.5)
GFR SERPL CREATININE-BSD FRML MDRD: 98 ML/MIN/1.73
GLOBULIN UR ELPH-MCNC: 2.3 GM/DL
GLUCOSE BLD-MCNC: 105 MG/DL (ref 70–110)
HCT VFR BLD AUTO: 40.6 % (ref 37–47)
HGB BLD-MCNC: 12.9 G/DL (ref 12–16)
IMM GRANULOCYTES # BLD: 0.04 10*3/MM3 (ref 0–0.03)
IMM GRANULOCYTES NFR BLD: 0.3 % (ref 0–0.5)
INR PPP: 1.36 (ref 0.9–1.1)
LYMPHOCYTES # BLD AUTO: 1.38 10*3/MM3 (ref 1–3)
LYMPHOCYTES NFR BLD AUTO: 8.9 % (ref 16–46)
MAXIMAL PREDICTED HEART RATE: 140 BPM
MCH RBC QN AUTO: 30.7 PG (ref 27–33)
MCHC RBC AUTO-ENTMCNC: 31.8 G/DL (ref 33–37)
MCV RBC AUTO: 96.7 FL (ref 80–94)
MONOCYTES # BLD AUTO: 0.96 10*3/MM3 (ref 0.1–0.9)
MONOCYTES NFR BLD AUTO: 6.2 % (ref 0–12)
NEUTROPHILS # BLD AUTO: 13.03 10*3/MM3 (ref 1.4–6.5)
NEUTROPHILS NFR BLD AUTO: 83.9 % (ref 40–75)
NRBC BLD MANUAL-RTO: 0 /100 WBC (ref 0–0)
OSMOLALITY SERPL CALC.SUM OF ELEC: 276.5 MOSM/KG (ref 273–305)
PLATELET # BLD AUTO: 209 10*3/MM3 (ref 130–400)
PMV BLD AUTO: 11.9 FL (ref 6–10)
POTASSIUM BLD-SCNC: 3.4 MMOL/L (ref 3.5–5.3)
PROT SERPL-MCNC: 5.4 G/DL (ref 6–8)
PROTHROMBIN TIME: 17.1 SECONDS (ref 11–15.4)
RBC # BLD AUTO: 4.2 10*6/MM3 (ref 4.2–5.4)
SODIUM BLD-SCNC: 138 MMOL/L (ref 135–153)
STRESS TARGET HR: 119 BPM
TROPONIN I SERPL-MCNC: 0.16 NG/ML
TROPONIN I SERPL-MCNC: 0.24 NG/ML
TSH SERPL DL<=0.05 MIU/L-ACNC: 0.87 MIU/ML (ref 0.55–4.78)
WBC NRBC COR # BLD: 15.52 10*3/MM3 (ref 4.5–12.5)

## 2018-09-25 PROCEDURE — 84443 ASSAY THYROID STIM HORMONE: CPT | Performed by: INTERNAL MEDICINE

## 2018-09-25 PROCEDURE — 85610 PROTHROMBIN TIME: CPT | Performed by: PHYSICIAN ASSISTANT

## 2018-09-25 PROCEDURE — 93005 ELECTROCARDIOGRAM TRACING: CPT | Performed by: INTERNAL MEDICINE

## 2018-09-25 PROCEDURE — 80053 COMPREHEN METABOLIC PANEL: CPT | Performed by: SURGERY

## 2018-09-25 PROCEDURE — 93306 TTE W/DOPPLER COMPLETE: CPT

## 2018-09-25 PROCEDURE — 84484 ASSAY OF TROPONIN QUANT: CPT | Performed by: INTERNAL MEDICINE

## 2018-09-25 PROCEDURE — 93306 TTE W/DOPPLER COMPLETE: CPT | Performed by: INTERNAL MEDICINE

## 2018-09-25 PROCEDURE — 85730 THROMBOPLASTIN TIME PARTIAL: CPT | Performed by: INTERNAL MEDICINE

## 2018-09-25 PROCEDURE — 25010000002 HEPARIN (PORCINE) PER 1000 UNITS: Performed by: INTERNAL MEDICINE

## 2018-09-25 PROCEDURE — 94799 UNLISTED PULMONARY SVC/PX: CPT

## 2018-09-25 PROCEDURE — 99232 SBSQ HOSP IP/OBS MODERATE 35: CPT | Performed by: INTERNAL MEDICINE

## 2018-09-25 PROCEDURE — 93010 ELECTROCARDIOGRAM REPORT: CPT | Performed by: INTERNAL MEDICINE

## 2018-09-25 PROCEDURE — 85730 THROMBOPLASTIN TIME PARTIAL: CPT | Performed by: PHYSICIAN ASSISTANT

## 2018-09-25 PROCEDURE — 99024 POSTOP FOLLOW-UP VISIT: CPT | Performed by: SURGERY

## 2018-09-25 PROCEDURE — 85025 COMPLETE CBC W/AUTO DIFF WBC: CPT | Performed by: PHYSICIAN ASSISTANT

## 2018-09-25 RX ADMIN — DILTIAZEM HYDROCHLORIDE 15 MG/HR: 5 INJECTION INTRAVENOUS at 00:42

## 2018-09-25 RX ADMIN — HEPARIN SODIUM 3900 UNITS: 5000 INJECTION, SOLUTION INTRAVENOUS; SUBCUTANEOUS at 10:17

## 2018-09-25 RX ADMIN — HEPARIN SODIUM 12 UNITS/KG/HR: 10000 INJECTION, SOLUTION INTRAVENOUS at 10:17

## 2018-09-25 RX ADMIN — METOPROLOL SUCCINATE 100 MG: 50 TABLET, FILM COATED, EXTENDED RELEASE ORAL at 08:57

## 2018-09-25 RX ADMIN — METRONIDAZOLE 500 MG: 500 INJECTION, SOLUTION INTRAVENOUS at 03:56

## 2018-09-25 RX ADMIN — ATORVASTATIN CALCIUM 10 MG: 10 TABLET, FILM COATED ORAL at 08:57

## 2018-09-25 RX ADMIN — HYDROCHLOROTHIAZIDE 12.5 MG: 25 TABLET ORAL at 08:57

## 2018-09-25 RX ADMIN — DILTIAZEM HYDROCHLORIDE 10 MG/HR: 5 INJECTION INTRAVENOUS at 12:29

## 2018-09-25 RX ADMIN — METRONIDAZOLE 500 MG: 500 INJECTION, SOLUTION INTRAVENOUS at 17:40

## 2018-09-25 RX ADMIN — METRONIDAZOLE 500 MG: 500 INJECTION, SOLUTION INTRAVENOUS at 10:08

## 2018-09-26 LAB
ANION GAP SERPL CALCULATED.3IONS-SCNC: 5.3 MMOL/L (ref 3.6–11.2)
APTT PPP: 44.8 SECONDS (ref 23.8–36.1)
APTT PPP: 88.3 SECONDS (ref 23.8–36.1)
BASOPHILS # BLD AUTO: 0.02 10*3/MM3 (ref 0–0.3)
BASOPHILS NFR BLD AUTO: 0.2 % (ref 0–2)
BUN BLD-MCNC: 11 MG/DL (ref 7–21)
BUN/CREAT SERPL: 23.9 (ref 7–25)
CALCIUM SPEC-SCNC: 7.8 MG/DL (ref 7.7–10)
CHLORIDE SERPL-SCNC: 101 MMOL/L (ref 99–112)
CO2 SERPL-SCNC: 26.7 MMOL/L (ref 24.3–31.9)
CREAT BLD-MCNC: 0.46 MG/DL (ref 0.43–1.29)
DEPRECATED RDW RBC AUTO: 47 FL (ref 37–54)
EOSINOPHIL # BLD AUTO: 0.15 10*3/MM3 (ref 0–0.7)
EOSINOPHIL NFR BLD AUTO: 1.2 % (ref 0–7)
ERYTHROCYTE [DISTWIDTH] IN BLOOD BY AUTOMATED COUNT: 13.7 % (ref 11.5–14.5)
GFR SERPL CREATININE-BSD FRML MDRD: 131 ML/MIN/1.73
GLUCOSE BLD-MCNC: 109 MG/DL (ref 70–110)
HCT VFR BLD AUTO: 39.6 % (ref 37–47)
HGB BLD-MCNC: 12.6 G/DL (ref 12–16)
IMM GRANULOCYTES # BLD: 0.02 10*3/MM3 (ref 0–0.03)
IMM GRANULOCYTES NFR BLD: 0.2 % (ref 0–0.5)
LAB AP CASE REPORT: NORMAL
LYMPHOCYTES # BLD AUTO: 1.55 10*3/MM3 (ref 1–3)
LYMPHOCYTES NFR BLD AUTO: 11.9 % (ref 16–46)
MAGNESIUM SERPL-MCNC: 2.5 MG/DL (ref 1.7–2.6)
MCH RBC QN AUTO: 30.7 PG (ref 27–33)
MCHC RBC AUTO-ENTMCNC: 31.8 G/DL (ref 33–37)
MCV RBC AUTO: 96.6 FL (ref 80–94)
MONOCYTES # BLD AUTO: 1.44 10*3/MM3 (ref 0.1–0.9)
MONOCYTES NFR BLD AUTO: 11.1 % (ref 0–12)
NEUTROPHILS # BLD AUTO: 9.84 10*3/MM3 (ref 1.4–6.5)
NEUTROPHILS NFR BLD AUTO: 75.4 % (ref 40–75)
OSMOLALITY SERPL CALC.SUM OF ELEC: 266.4 MOSM/KG (ref 273–305)
PATH REPORT.FINAL DX SPEC: NORMAL
PLATELET # BLD AUTO: 221 10*3/MM3 (ref 130–400)
PMV BLD AUTO: 11.6 FL (ref 6–10)
POTASSIUM BLD-SCNC: 3.4 MMOL/L (ref 3.5–5.3)
RBC # BLD AUTO: 4.1 10*6/MM3 (ref 4.2–5.4)
SODIUM BLD-SCNC: 133 MMOL/L (ref 135–153)
WBC NRBC COR # BLD: 13.02 10*3/MM3 (ref 4.5–12.5)

## 2018-09-26 PROCEDURE — 80048 BASIC METABOLIC PNL TOTAL CA: CPT | Performed by: INTERNAL MEDICINE

## 2018-09-26 PROCEDURE — 93005 ELECTROCARDIOGRAM TRACING: CPT | Performed by: SURGERY

## 2018-09-26 PROCEDURE — 94799 UNLISTED PULMONARY SVC/PX: CPT

## 2018-09-26 PROCEDURE — 25010000002 HEPARIN (PORCINE) PER 1000 UNITS: Performed by: INTERNAL MEDICINE

## 2018-09-26 PROCEDURE — 99232 SBSQ HOSP IP/OBS MODERATE 35: CPT | Performed by: INTERNAL MEDICINE

## 2018-09-26 PROCEDURE — 85730 THROMBOPLASTIN TIME PARTIAL: CPT | Performed by: INTERNAL MEDICINE

## 2018-09-26 PROCEDURE — 83735 ASSAY OF MAGNESIUM: CPT | Performed by: INTERNAL MEDICINE

## 2018-09-26 PROCEDURE — 85025 COMPLETE CBC W/AUTO DIFF WBC: CPT | Performed by: INTERNAL MEDICINE

## 2018-09-26 PROCEDURE — 99024 POSTOP FOLLOW-UP VISIT: CPT | Performed by: SURGERY

## 2018-09-26 PROCEDURE — 93010 ELECTROCARDIOGRAM REPORT: CPT | Performed by: INTERNAL MEDICINE

## 2018-09-26 RX ORDER — POTASSIUM CHLORIDE 1.5 G/1.77G
40 POWDER, FOR SOLUTION ORAL AS NEEDED
Status: DISCONTINUED | OUTPATIENT
Start: 2018-09-26 | End: 2018-09-28 | Stop reason: HOSPADM

## 2018-09-26 RX ORDER — DILTIAZEM HYDROCHLORIDE 180 MG/1
180 CAPSULE, COATED, EXTENDED RELEASE ORAL
Status: DISCONTINUED | OUTPATIENT
Start: 2018-09-26 | End: 2018-09-26 | Stop reason: SDUPTHER

## 2018-09-26 RX ORDER — POTASSIUM CHLORIDE 20 MEQ/1
40 TABLET, EXTENDED RELEASE ORAL EVERY 4 HOURS
Status: COMPLETED | OUTPATIENT
Start: 2018-09-26 | End: 2018-09-26

## 2018-09-26 RX ORDER — DILTIAZEM HYDROCHLORIDE 180 MG/1
180 CAPSULE, COATED, EXTENDED RELEASE ORAL
Status: DISCONTINUED | OUTPATIENT
Start: 2018-09-26 | End: 2018-09-28

## 2018-09-26 RX ORDER — POTASSIUM CHLORIDE 750 MG/1
40 CAPSULE, EXTENDED RELEASE ORAL AS NEEDED
Status: DISCONTINUED | OUTPATIENT
Start: 2018-09-26 | End: 2018-09-28 | Stop reason: HOSPADM

## 2018-09-26 RX ADMIN — DILTIAZEM HYDROCHLORIDE 10 MG/HR: 5 INJECTION INTRAVENOUS at 11:42

## 2018-09-26 RX ADMIN — METRONIDAZOLE 500 MG: 500 INJECTION, SOLUTION INTRAVENOUS at 02:47

## 2018-09-26 RX ADMIN — POTASSIUM CHLORIDE 40 MEQ: 1500 TABLET, EXTENDED RELEASE ORAL at 23:57

## 2018-09-26 RX ADMIN — HEPARIN SODIUM 18 UNITS/KG/HR: 10000 INJECTION, SOLUTION INTRAVENOUS at 05:50

## 2018-09-26 RX ADMIN — APIXABAN 5 MG: 5 TABLET, FILM COATED ORAL at 20:17

## 2018-09-26 RX ADMIN — DILTIAZEM HYDROCHLORIDE 180 MG: 180 CAPSULE, EXTENDED RELEASE ORAL at 12:38

## 2018-09-26 RX ADMIN — HYDROCODONE BITARTRATE AND ACETAMINOPHEN 1 TABLET: 5; 325 TABLET ORAL at 03:56

## 2018-09-26 RX ADMIN — APIXABAN 5 MG: 5 TABLET, FILM COATED ORAL at 12:39

## 2018-09-26 RX ADMIN — HEPARIN SODIUM 3900 UNITS: 5000 INJECTION, SOLUTION INTRAVENOUS; SUBCUTANEOUS at 05:50

## 2018-09-26 RX ADMIN — ATORVASTATIN CALCIUM 10 MG: 10 TABLET, FILM COATED ORAL at 08:07

## 2018-09-26 RX ADMIN — METOPROLOL SUCCINATE 100 MG: 50 TABLET, FILM COATED, EXTENDED RELEASE ORAL at 08:07

## 2018-09-26 RX ADMIN — POTASSIUM CHLORIDE 40 MEQ: 1500 TABLET, EXTENDED RELEASE ORAL at 20:17

## 2018-09-26 RX ADMIN — DILTIAZEM HYDROCHLORIDE 10 MG/HR: 5 INJECTION INTRAVENOUS at 02:35

## 2018-09-26 RX ADMIN — HYDROCHLOROTHIAZIDE 12.5 MG: 25 TABLET ORAL at 08:07

## 2018-09-27 LAB
ALBUMIN SERPL-MCNC: 3.1 G/DL (ref 3.4–4.8)
ALBUMIN/GLOB SERPL: 1.5 G/DL (ref 1.5–2.5)
ALP SERPL-CCNC: 75 U/L (ref 35–104)
ALT SERPL W P-5'-P-CCNC: 17 U/L (ref 10–36)
ANION GAP SERPL CALCULATED.3IONS-SCNC: 6.9 MMOL/L (ref 3.6–11.2)
AST SERPL-CCNC: 18 U/L (ref 10–30)
BACTERIA SPEC AEROBE CULT: NORMAL
BACTERIA SPEC AEROBE CULT: NORMAL
BASOPHILS # BLD AUTO: 0.01 10*3/MM3 (ref 0–0.3)
BASOPHILS NFR BLD AUTO: 0.1 % (ref 0–2)
BILIRUB SERPL-MCNC: 0.9 MG/DL (ref 0.2–1.8)
BUN BLD-MCNC: 11 MG/DL (ref 7–21)
BUN/CREAT SERPL: 25.6 (ref 7–25)
CALCIUM SPEC-SCNC: 8.1 MG/DL (ref 7.7–10)
CHLORIDE SERPL-SCNC: 103 MMOL/L (ref 99–112)
CO2 SERPL-SCNC: 28.1 MMOL/L (ref 24.3–31.9)
CREAT BLD-MCNC: 0.43 MG/DL (ref 0.43–1.29)
DEPRECATED RDW RBC AUTO: 47 FL (ref 37–54)
EOSINOPHIL # BLD AUTO: 0.18 10*3/MM3 (ref 0–0.7)
EOSINOPHIL NFR BLD AUTO: 1.9 % (ref 0–7)
ERYTHROCYTE [DISTWIDTH] IN BLOOD BY AUTOMATED COUNT: 13.8 % (ref 11.5–14.5)
GFR SERPL CREATININE-BSD FRML MDRD: 141 ML/MIN/1.73
GLOBULIN UR ELPH-MCNC: 2.1 GM/DL
GLUCOSE BLD-MCNC: 98 MG/DL (ref 70–110)
HCT VFR BLD AUTO: 39.5 % (ref 37–47)
HGB BLD-MCNC: 12.7 G/DL (ref 12–16)
IMM GRANULOCYTES # BLD: 0.02 10*3/MM3 (ref 0–0.03)
IMM GRANULOCYTES NFR BLD: 0.2 % (ref 0–0.5)
LYMPHOCYTES # BLD AUTO: 1.31 10*3/MM3 (ref 1–3)
LYMPHOCYTES NFR BLD AUTO: 13.7 % (ref 16–46)
MCH RBC QN AUTO: 31.1 PG (ref 27–33)
MCHC RBC AUTO-ENTMCNC: 32.2 G/DL (ref 33–37)
MCV RBC AUTO: 96.6 FL (ref 80–94)
MONOCYTES # BLD AUTO: 1.16 10*3/MM3 (ref 0.1–0.9)
MONOCYTES NFR BLD AUTO: 12.1 % (ref 0–12)
NEUTROPHILS # BLD AUTO: 6.91 10*3/MM3 (ref 1.4–6.5)
NEUTROPHILS NFR BLD AUTO: 72 % (ref 40–75)
OSMOLALITY SERPL CALC.SUM OF ELEC: 275.1 MOSM/KG (ref 273–305)
PLATELET # BLD AUTO: 229 10*3/MM3 (ref 130–400)
PMV BLD AUTO: 11.5 FL (ref 6–10)
POTASSIUM BLD-SCNC: 4 MMOL/L (ref 3.5–5.3)
POTASSIUM BLD-SCNC: 4 MMOL/L (ref 3.5–5.3)
PROT SERPL-MCNC: 5.2 G/DL (ref 6–8)
RBC # BLD AUTO: 4.09 10*6/MM3 (ref 4.2–5.4)
SODIUM BLD-SCNC: 138 MMOL/L (ref 135–153)
WBC NRBC COR # BLD: 9.59 10*3/MM3 (ref 4.5–12.5)

## 2018-09-27 PROCEDURE — 99232 SBSQ HOSP IP/OBS MODERATE 35: CPT | Performed by: INTERNAL MEDICINE

## 2018-09-27 PROCEDURE — 85025 COMPLETE CBC W/AUTO DIFF WBC: CPT | Performed by: INTERNAL MEDICINE

## 2018-09-27 PROCEDURE — 94799 UNLISTED PULMONARY SVC/PX: CPT

## 2018-09-27 PROCEDURE — 99024 POSTOP FOLLOW-UP VISIT: CPT | Performed by: SURGERY

## 2018-09-27 PROCEDURE — 80053 COMPREHEN METABOLIC PANEL: CPT | Performed by: SURGERY

## 2018-09-27 RX ADMIN — APIXABAN 5 MG: 5 TABLET, FILM COATED ORAL at 08:09

## 2018-09-27 RX ADMIN — HYDROCHLOROTHIAZIDE 12.5 MG: 25 TABLET ORAL at 08:09

## 2018-09-27 RX ADMIN — METOPROLOL SUCCINATE 100 MG: 50 TABLET, FILM COATED, EXTENDED RELEASE ORAL at 08:08

## 2018-09-27 RX ADMIN — DILTIAZEM HYDROCHLORIDE 10 MG/HR: 5 INJECTION INTRAVENOUS at 00:15

## 2018-09-27 RX ADMIN — DILTIAZEM HYDROCHLORIDE 180 MG: 180 CAPSULE, EXTENDED RELEASE ORAL at 08:10

## 2018-09-27 RX ADMIN — APIXABAN 5 MG: 5 TABLET, FILM COATED ORAL at 21:52

## 2018-09-27 RX ADMIN — ATORVASTATIN CALCIUM 10 MG: 10 TABLET, FILM COATED ORAL at 08:10

## 2018-09-28 VITALS
BODY MASS INDEX: 30.91 KG/M2 | WEIGHT: 185.5 LBS | HEIGHT: 65 IN | DIASTOLIC BLOOD PRESSURE: 62 MMHG | OXYGEN SATURATION: 95 % | RESPIRATION RATE: 20 BRPM | SYSTOLIC BLOOD PRESSURE: 137 MMHG | HEART RATE: 91 BPM | TEMPERATURE: 97.8 F

## 2018-09-28 LAB
A-A DO2: 35.4 MMHG (ref 0–300)
ARTERIAL PATENCY WRIST A: ABNORMAL
ATMOSPHERIC PRESS: 729 MMHG
BASE EXCESS BLDA CALC-SCNC: 3.4 MMOL/L
BASOPHILS # BLD AUTO: 0.03 10*3/MM3 (ref 0–0.3)
BASOPHILS NFR BLD AUTO: 0.3 % (ref 0–2)
BDY SITE: ABNORMAL
BODY TEMPERATURE: 98.6 C
COHGB MFR BLD: 1.2 % (ref 0–5)
DEPRECATED RDW RBC AUTO: 47.8 FL (ref 37–54)
EOSINOPHIL # BLD AUTO: 0.22 10*3/MM3 (ref 0–0.7)
EOSINOPHIL NFR BLD AUTO: 2 % (ref 0–7)
ERYTHROCYTE [DISTWIDTH] IN BLOOD BY AUTOMATED COUNT: 13.8 % (ref 11.5–14.5)
HCO3 BLDA-SCNC: 28.5 MMOL/L (ref 22–26)
HCT VFR BLD AUTO: 40.6 % (ref 37–47)
HCT VFR BLD CALC: 41 % (ref 37–47)
HGB BLD-MCNC: 13 G/DL (ref 12–16)
HGB BLDA-MCNC: 13.9 G/DL (ref 12–16)
HOROWITZ INDEX BLD+IHG-RTO: 21 %
IMM GRANULOCYTES # BLD: 0.04 10*3/MM3 (ref 0–0.03)
IMM GRANULOCYTES NFR BLD: 0.4 % (ref 0–0.5)
LYMPHOCYTES # BLD AUTO: 1.6 10*3/MM3 (ref 1–3)
LYMPHOCYTES NFR BLD AUTO: 14.4 % (ref 16–46)
MCH RBC QN AUTO: 31.1 PG (ref 27–33)
MCHC RBC AUTO-ENTMCNC: 32 G/DL (ref 33–37)
MCV RBC AUTO: 97.1 FL (ref 80–94)
METHGB BLD QL: 0.2 % (ref 0–3)
MODALITY: ABNORMAL
MONOCYTES # BLD AUTO: 1.31 10*3/MM3 (ref 0.1–0.9)
MONOCYTES NFR BLD AUTO: 11.8 % (ref 0–12)
NEUTROPHILS # BLD AUTO: 7.93 10*3/MM3 (ref 1.4–6.5)
NEUTROPHILS NFR BLD AUTO: 71.1 % (ref 40–75)
OXYHGB MFR BLDV: 89 % (ref 85–100)
PCO2 BLDA: 45.1 MM HG (ref 35–45)
PH BLDA: 7.42 PH UNITS (ref 7.35–7.45)
PLATELET # BLD AUTO: 228 10*3/MM3 (ref 130–400)
PMV BLD AUTO: 11 FL (ref 6–10)
PO2 BLDA: 53.8 MM HG (ref 80–100)
RBC # BLD AUTO: 4.18 10*6/MM3 (ref 4.2–5.4)
SAO2 % BLDCOA: 90.3 % (ref 90–100)
WBC NRBC COR # BLD: 11.13 10*3/MM3 (ref 4.5–12.5)

## 2018-09-28 PROCEDURE — 82375 ASSAY CARBOXYHB QUANT: CPT | Performed by: INTERNAL MEDICINE

## 2018-09-28 PROCEDURE — 85025 COMPLETE CBC W/AUTO DIFF WBC: CPT | Performed by: INTERNAL MEDICINE

## 2018-09-28 PROCEDURE — 83050 HGB METHEMOGLOBIN QUAN: CPT | Performed by: INTERNAL MEDICINE

## 2018-09-28 PROCEDURE — 82805 BLOOD GASES W/O2 SATURATION: CPT | Performed by: INTERNAL MEDICINE

## 2018-09-28 PROCEDURE — 99024 POSTOP FOLLOW-UP VISIT: CPT | Performed by: SURGERY

## 2018-09-28 PROCEDURE — 99232 SBSQ HOSP IP/OBS MODERATE 35: CPT | Performed by: INTERNAL MEDICINE

## 2018-09-28 PROCEDURE — 36600 WITHDRAWAL OF ARTERIAL BLOOD: CPT | Performed by: INTERNAL MEDICINE

## 2018-09-28 RX ORDER — DILTIAZEM HYDROCHLORIDE 240 MG/1
240 CAPSULE, COATED, EXTENDED RELEASE ORAL
Status: DISCONTINUED | OUTPATIENT
Start: 2018-09-29 | End: 2018-09-28 | Stop reason: HOSPADM

## 2018-09-28 RX ORDER — HYDROCODONE BITARTRATE AND ACETAMINOPHEN 5; 325 MG/1; MG/1
1 TABLET ORAL EVERY 8 HOURS PRN
Qty: 12 TABLET | Refills: 0 | Status: SHIPPED | OUTPATIENT
Start: 2018-09-28

## 2018-09-28 RX ORDER — DILTIAZEM HYDROCHLORIDE 240 MG/1
240 CAPSULE, COATED, EXTENDED RELEASE ORAL DAILY
Qty: 30 CAPSULE | Refills: 1 | Status: SHIPPED | OUTPATIENT
Start: 2018-09-28

## 2018-09-28 RX ADMIN — METOPROLOL SUCCINATE 100 MG: 50 TABLET, FILM COATED, EXTENDED RELEASE ORAL at 08:28

## 2018-09-28 RX ADMIN — HYDROCHLOROTHIAZIDE 12.5 MG: 25 TABLET ORAL at 08:29

## 2018-09-28 RX ADMIN — DILTIAZEM HYDROCHLORIDE 180 MG: 180 CAPSULE, EXTENDED RELEASE ORAL at 08:29

## 2018-09-28 RX ADMIN — ATORVASTATIN CALCIUM 10 MG: 10 TABLET, FILM COATED ORAL at 08:29

## 2018-09-28 RX ADMIN — APIXABAN 5 MG: 5 TABLET, FILM COATED ORAL at 08:28

## 2018-09-29 ENCOUNTER — READMISSION MANAGEMENT (OUTPATIENT)
Dept: CALL CENTER | Facility: HOSPITAL | Age: 78
End: 2018-09-29

## 2018-09-29 NOTE — OUTREACH NOTE
Prep Survey      Responses   Facility patient discharged from?  [facility]   Is patient eligible?  Yes   Discharge diagnosis  Acute cholecystitis, AFib, Laparascopic cholecystectomy   Does the patient have one of the following disease processes/diagnoses(primary or secondary)?  General Surgery   Does the patient have Home health ordered?  No   Is there a DME ordered?  Yes   What DME was ordered?  Home oxygen per [facility]   Comments regarding appointments  See AVS   Prep survey completed?  Yes          Dyana Mar RN

## 2018-09-30 ENCOUNTER — READMISSION MANAGEMENT (OUTPATIENT)
Dept: CALL CENTER | Facility: HOSPITAL | Age: 78
End: 2018-09-30

## 2018-09-30 NOTE — OUTREACH NOTE
General Surgery Week 1 Survey      Responses   Facility patient discharged from?  [facility]   Does the patient have one of the following disease processes/diagnoses(primary or secondary)?  General Surgery   Is there a successful TCM telephone encounter documented?  No   Week 1 attempt successful?  No   Unsuccessful attempts  Attempt 1          Raisa Valentin RN

## 2018-10-03 ENCOUNTER — READMISSION MANAGEMENT (OUTPATIENT)
Dept: CALL CENTER | Facility: HOSPITAL | Age: 78
End: 2018-10-03

## 2018-10-03 NOTE — OUTREACH NOTE
General Surgery Week 1 Survey      Responses   Facility patient discharged from?  [facility]   Does the patient have one of the following disease processes/diagnoses(primary or secondary)?  General Surgery   Is there a successful TCM telephone encounter documented?  No   Week 1 attempt successful?  No   Unsuccessful attempts  Attempt 2          Debbie Welsh RN

## 2018-10-08 ENCOUNTER — READMISSION MANAGEMENT (OUTPATIENT)
Dept: CALL CENTER | Facility: HOSPITAL | Age: 78
End: 2018-10-08

## 2018-10-08 NOTE — OUTREACH NOTE
General Surgery Week 1 Survey      Responses   Facility patient discharged from?  [facility]   Does the patient have one of the following disease processes/diagnoses(primary or secondary)?  General Surgery   Is there a successful TCM telephone encounter documented?  No   Week 1 attempt successful?  No   Unsuccessful attempts  Attempt 3          Mariya Camarillo RN

## 2018-10-10 ENCOUNTER — READMISSION MANAGEMENT (OUTPATIENT)
Dept: CALL CENTER | Facility: HOSPITAL | Age: 78
End: 2018-10-10

## 2018-10-10 NOTE — OUTREACH NOTE
General Surgery Week 2 Survey      Responses   Facility patient discharged from?  [facility]   Does the patient have one of the following disease processes/diagnoses(primary or secondary)?  General Surgery   Week 2 attempt successful?  Yes   Call start time  1511   Call end time  1514   Discharge diagnosis  Acute cholecystitis, AFib, Laparascopic cholecystectomy   Meds reviewed with patient/caregiver?  Yes   Is the patient taking all medications as directed (includes completed medication regime)?  Yes   Has the patient kept scheduled appointments due by today?  Yes   DME comments  Home O2 is no longer needed. RA sat 98%   Psychosocial issues?  No   Comments  Incisions healing without s/sx of inf. per pt.    Did the patient receive a copy of their discharge instructions?  Yes   Nursing interventions  Reviewed instructions with patient   What is the patient's perception of their health status since discharge?  Improving   Nursing interventions  Nurse provided patient education   Is the patient /caregiver able to teach back basic post-op care?  Keep incision areas clean,dry and protected   Is the patient/caregiver able to teach back signs and symptoms of incisional infection?  Increased redness, swelling or pain at the incisonal site, Increased drainage or bleeding, Fever, Incisional warmth, Pus or odor from incision   Is the patient/caregiver able to teach back the hierarchy of who to call/visit for symptoms/problems? PCP, Specialist, Home health nurse, Urgent Care, ED, 911  Yes   Week 2 call completed?  Yes   Revoked  No further contact(revokes)-requires comment   Graduated/Revoked comments  Pt ended survey quickly as she is on way to appt with Cardiology. She has seen PCP, will f/u with surg in [place]. Pt feels she is doing great.          Kim Padron, DIEGO

## 2023-04-19 ENCOUNTER — APPOINTMENT (RX ONLY)
Dept: URBAN - METROPOLITAN AREA CLINIC 170 | Facility: CLINIC | Age: 83
Setting detail: DERMATOLOGY
End: 2023-04-19

## 2023-04-19 DIAGNOSIS — B35.3 TINEA PEDIS: ICD-10-CM

## 2023-04-19 DIAGNOSIS — I87.2 VENOUS INSUFFICIENCY (CHRONIC) (PERIPHERAL): ICD-10-CM

## 2023-04-19 PROCEDURE — ? COUNSELING

## 2023-04-19 PROCEDURE — ? PRESCRIPTION

## 2023-04-19 PROCEDURE — 99204 OFFICE O/P NEW MOD 45 MIN: CPT

## 2023-04-19 RX ORDER — TERBINAFINE HYDROCHLORIDE 250 MG/1
TABLET ORAL
Qty: 14 | Refills: 0 | Status: ERX | COMMUNITY
Start: 2023-04-19

## 2023-04-19 RX ORDER — TERBINAFINE HYDROCHLORIDE 1 G/100G
CREAM TOPICAL
Qty: 30 | Refills: 3 | Status: ERX | COMMUNITY
Start: 2023-04-19

## 2023-04-19 RX ADMIN — TERBINAFINE HYDROCHLORIDE: 1 CREAM TOPICAL at 00:00

## 2023-04-19 RX ADMIN — TERBINAFINE HYDROCHLORIDE: 250 TABLET ORAL at 00:00

## 2023-04-19 ASSESSMENT — LOCATION DETAILED DESCRIPTION DERM
LOCATION DETAILED: RIGHT DISTAL PRETIBIAL REGION
LOCATION DETAILED: RIGHT MEDIAL DORSAL FOOT
LOCATION DETAILED: 1ST WEBSPACE RIGHT FOOT
LOCATION DETAILED: LEFT DISTAL PRETIBIAL REGION
LOCATION DETAILED: 2ND WEBSPACE RIGHT FOOT

## 2023-04-19 ASSESSMENT — LOCATION ZONE DERM
LOCATION ZONE: FEET
LOCATION ZONE: LEG

## 2023-04-19 ASSESSMENT — LOCATION SIMPLE DESCRIPTION DERM
LOCATION SIMPLE: RIGHT PRETIBIAL REGION
LOCATION SIMPLE: LEFT PRETIBIAL REGION
LOCATION SIMPLE: RIGHT FOOT

## 2023-04-19 NOTE — HPI: RASH
What Type Of Note Output Would You Prefer (Optional)?: Bullet Format
How Severe Is Your Rash?: moderate
Is This A New Presentation, Or A Follow-Up?: Rash
Additional History: History of Tinea Pedis, past treatment Terbinafine

## 2024-04-01 ENCOUNTER — APPOINTMENT (OUTPATIENT)
Age: 84
DRG: 481 | End: 2024-04-01
Payer: MEDICARE

## 2024-04-01 ENCOUNTER — HOSPITAL ENCOUNTER (INPATIENT)
Age: 84
LOS: 4 days | Discharge: NURSING, REHAB OR LONG-TERM CARE | DRG: 481 | End: 2024-04-05
Attending: EMERGENCY MEDICINE | Admitting: STUDENT IN AN ORGANIZED HEALTH CARE EDUCATION/TRAINING PROGRAM
Payer: MEDICARE

## 2024-04-01 DIAGNOSIS — S72.141A CLOSED INTERTROCHANTERIC FRACTURE OF HIP, RIGHT, INITIAL ENCOUNTER (HCC): Primary | ICD-10-CM

## 2024-04-01 DIAGNOSIS — I48.91 ATRIAL FIBRILLATION WITH RAPID VENTRICULAR RESPONSE (HCC): ICD-10-CM
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
## 2024-04-01 LAB
ABO + RH BLD: NORMAL
ANION GAP SERPL CALCULATED.3IONS-SCNC: 11 MMOL/L (ref 3–16)
BASOPHILS # BLD: 0.02 K/UL (ref 0–0.2)
BASOPHILS NFR BLD: 0 %
BLOOD BANK SAMPLE EXPIRATION: NORMAL
BLOOD GROUP ANTIBODIES SERPL: NEGATIVE
BUN SERPL-MCNC: 16 MG/DL (ref 7–20)
CALCIUM SERPL-MCNC: 9.4 MG/DL (ref 8.3–10.6)
CHLORIDE SERPL-SCNC: 104 MMOL/L (ref 99–110)
CO2 SERPL-SCNC: 29 MMOL/L (ref 21–32)
CREAT SERPL-MCNC: 0.7 MG/DL (ref 0.6–1.2)
EOSINOPHIL # BLD: 0.1 K/UL (ref 0–0.6)
EOSINOPHILS RELATIVE PERCENT: 1 %
ERYTHROCYTE [DISTWIDTH] IN BLOOD BY AUTOMATED COUNT: 12.6 % (ref 12.4–15.4)
GFR SERPL CREATININE-BSD FRML MDRD: 86 ML/MIN/1.73M2
GLUCOSE SERPL-MCNC: 136 MG/DL (ref 70–99)
HCT VFR BLD AUTO: 46.1 % (ref 36–48)
HGB BLD-MCNC: 15.2 G/DL (ref 12–16)
IMM GRANULOCYTES # BLD AUTO: 0.08 K/UL (ref 0–0.5)
IMM GRANULOCYTES NFR BLD: 1 %
INR PPP: 1.1 (ref 0.8–1.2)
LYMPHOCYTES NFR BLD: 2.1 K/UL (ref 1–5.1)
LYMPHOCYTES RELATIVE PERCENT: 15 %
MAGNESIUM SERPL-MCNC: 1.8 MG/DL (ref 1.8–2.4)
MCH RBC QN AUTO: 31.9 PG (ref 26–34)
MCHC RBC AUTO-ENTMCNC: 33 G/DL (ref 31–36)
MCV RBC AUTO: 96.8 FL (ref 80–100)
MONOCYTES NFR BLD: 0.9 K/UL (ref 0–1.3)
MONOCYTES NFR BLD: 6 %
NEUTROPHILS NFR BLD: 77 %
NEUTS SEG NFR BLD: 10.94 K/UL (ref 1.7–7.7)
PARTIAL THROMBOPLASTIN TIME: 34.7 SEC (ref 22.7–35.9)
PARTIAL THROMBOPLASTIN TIME: 34.9 SEC (ref 22.7–35.9)
PLATELET # BLD AUTO: 210 K/UL (ref 135–450)
PMV BLD AUTO: 11 FL
POTASSIUM SERPL-SCNC: 3.6 MMOL/L (ref 3.5–5.1)
PROTHROMBIN TIME: 13.4 SEC (ref 11.5–14.8)
RBC # BLD AUTO: 4.76 M/UL (ref 4–5.2)
SODIUM SERPL-SCNC: 143 MMOL/L (ref 136–145)
TROPONIN I SERPL HS-MCNC: 15 NG/L (ref 0–14)
TROPONIN I SERPL HS-MCNC: 17 NG/L (ref 0–14)
WBC OTHER # BLD: 14.1 K/UL (ref 4–11)

## 2024-04-01 PROCEDURE — 2500000003 HC RX 250 WO HCPCS: Performed by: EMERGENCY MEDICINE

## 2024-04-01 PROCEDURE — 83735 ASSAY OF MAGNESIUM: CPT

## 2024-04-01 PROCEDURE — 96376 TX/PRO/DX INJ SAME DRUG ADON: CPT

## 2024-04-01 PROCEDURE — 85025 COMPLETE CBC W/AUTO DIFF WBC: CPT

## 2024-04-01 PROCEDURE — 73552 X-RAY EXAM OF FEMUR 2/>: CPT

## 2024-04-01 PROCEDURE — 2580000003 HC RX 258: Performed by: STUDENT IN AN ORGANIZED HEALTH CARE EDUCATION/TRAINING PROGRAM

## 2024-04-01 PROCEDURE — 36415 COLL VENOUS BLD VENIPUNCTURE: CPT

## 2024-04-01 PROCEDURE — 96375 TX/PRO/DX INJ NEW DRUG ADDON: CPT

## 2024-04-01 PROCEDURE — 85610 PROTHROMBIN TIME: CPT

## 2024-04-01 PROCEDURE — 72125 CT NECK SPINE W/O DYE: CPT

## 2024-04-01 PROCEDURE — 93005 ELECTROCARDIOGRAM TRACING: CPT

## 2024-04-01 PROCEDURE — 70450 CT HEAD/BRAIN W/O DYE: CPT

## 2024-04-01 PROCEDURE — 84484 ASSAY OF TROPONIN QUANT: CPT

## 2024-04-01 PROCEDURE — 1200000000 HC SEMI PRIVATE

## 2024-04-01 PROCEDURE — 99291 CRITICAL CARE FIRST HOUR: CPT

## 2024-04-01 PROCEDURE — 6370000000 HC RX 637 (ALT 250 FOR IP): Performed by: STUDENT IN AN ORGANIZED HEALTH CARE EDUCATION/TRAINING PROGRAM

## 2024-04-01 PROCEDURE — 93005 ELECTROCARDIOGRAM TRACING: CPT | Performed by: EMERGENCY MEDICINE

## 2024-04-01 PROCEDURE — 2500000003 HC RX 250 WO HCPCS: Performed by: STUDENT IN AN ORGANIZED HEALTH CARE EDUCATION/TRAINING PROGRAM

## 2024-04-01 PROCEDURE — 86900 BLOOD TYPING SEROLOGIC ABO: CPT

## 2024-04-01 PROCEDURE — 86901 BLOOD TYPING SEROLOGIC RH(D): CPT

## 2024-04-01 PROCEDURE — 6360000002 HC RX W HCPCS: Performed by: EMERGENCY MEDICINE

## 2024-04-01 PROCEDURE — 86850 RBC ANTIBODY SCREEN: CPT

## 2024-04-01 PROCEDURE — 6360000002 HC RX W HCPCS: Performed by: STUDENT IN AN ORGANIZED HEALTH CARE EDUCATION/TRAINING PROGRAM

## 2024-04-01 PROCEDURE — 85730 THROMBOPLASTIN TIME PARTIAL: CPT

## 2024-04-01 PROCEDURE — 80048 BASIC METABOLIC PNL TOTAL CA: CPT

## 2024-04-01 PROCEDURE — 71045 X-RAY EXAM CHEST 1 VIEW: CPT

## 2024-04-01 PROCEDURE — 96374 THER/PROPH/DIAG INJ IV PUSH: CPT

## 2024-04-01 RX ORDER — POTASSIUM CHLORIDE 20 MEQ/1
40 TABLET, EXTENDED RELEASE ORAL ONCE
Status: COMPLETED | OUTPATIENT
Start: 2024-04-01 | End: 2024-04-02

## 2024-04-01 RX ORDER — SODIUM CHLORIDE 0.9 % (FLUSH) 0.9 %
5-40 SYRINGE (ML) INJECTION PRN
Status: DISCONTINUED | OUTPATIENT
Start: 2024-04-01 | End: 2024-04-03 | Stop reason: SDUPTHER

## 2024-04-01 RX ORDER — ACETAMINOPHEN 650 MG/1
650 SUPPOSITORY RECTAL EVERY 6 HOURS PRN
Status: DISCONTINUED | OUTPATIENT
Start: 2024-04-01 | End: 2024-04-05 | Stop reason: HOSPADM

## 2024-04-01 RX ORDER — POTASSIUM CHLORIDE 20 MEQ/1
40 TABLET, EXTENDED RELEASE ORAL PRN
Status: DISCONTINUED | OUTPATIENT
Start: 2024-04-01 | End: 2024-04-05 | Stop reason: HOSPADM

## 2024-04-01 RX ORDER — SODIUM CHLORIDE 0.9 % (FLUSH) 0.9 %
5-40 SYRINGE (ML) INJECTION EVERY 12 HOURS SCHEDULED
Status: DISCONTINUED | OUTPATIENT
Start: 2024-04-01 | End: 2024-04-03 | Stop reason: SDUPTHER

## 2024-04-01 RX ORDER — ONDANSETRON 4 MG/1
4 TABLET, ORALLY DISINTEGRATING ORAL EVERY 8 HOURS PRN
Status: DISCONTINUED | OUTPATIENT
Start: 2024-04-01 | End: 2024-04-05 | Stop reason: HOSPADM

## 2024-04-01 RX ORDER — METOPROLOL SUCCINATE 100 MG/1
100 TABLET, EXTENDED RELEASE ORAL DAILY
Status: ON HOLD | COMMUNITY
Start: 2024-01-03

## 2024-04-01 RX ORDER — OXYCODONE HYDROCHLORIDE 5 MG/1
5 TABLET ORAL EVERY 4 HOURS PRN
Status: DISCONTINUED | OUTPATIENT
Start: 2024-04-01 | End: 2024-04-03

## 2024-04-01 RX ORDER — DILTIAZEM HYDROCHLORIDE 5 MG/ML
10 INJECTION INTRAVENOUS ONCE
Status: COMPLETED | OUTPATIENT
Start: 2024-04-01 | End: 2024-04-01

## 2024-04-01 RX ORDER — ONDANSETRON 2 MG/ML
4 INJECTION INTRAMUSCULAR; INTRAVENOUS ONCE
Status: COMPLETED | OUTPATIENT
Start: 2024-04-01 | End: 2024-04-01

## 2024-04-01 RX ORDER — HYDROCHLOROTHIAZIDE 25 MG/1
12.5 TABLET ORAL DAILY
Status: ON HOLD | COMMUNITY
Start: 2023-12-29 | End: 2024-04-05 | Stop reason: HOSPADM

## 2024-04-01 RX ORDER — ACETAMINOPHEN 325 MG/1
650 TABLET ORAL EVERY 8 HOURS SCHEDULED
Status: DISCONTINUED | OUTPATIENT
Start: 2024-04-01 | End: 2024-04-03

## 2024-04-01 RX ORDER — SODIUM CHLORIDE 9 MG/ML
INJECTION, SOLUTION INTRAVENOUS PRN
Status: DISCONTINUED | OUTPATIENT
Start: 2024-04-01 | End: 2024-04-03 | Stop reason: SDUPTHER

## 2024-04-01 RX ORDER — FENTANYL CITRATE 50 UG/ML
25 INJECTION, SOLUTION INTRAMUSCULAR; INTRAVENOUS ONCE
Status: COMPLETED | OUTPATIENT
Start: 2024-04-01 | End: 2024-04-01

## 2024-04-01 RX ORDER — MULTIPLE VITAMINS W/ MINERALS TAB 9MG-400MCG
1 TAB ORAL DAILY
Status: ON HOLD | COMMUNITY

## 2024-04-01 RX ORDER — PRAVASTATIN SODIUM 40 MG
40 TABLET ORAL DAILY
Status: ON HOLD | COMMUNITY
Start: 2023-12-29

## 2024-04-01 RX ORDER — MAGNESIUM SULFATE IN WATER 40 MG/ML
2000 INJECTION, SOLUTION INTRAVENOUS PRN
Status: DISCONTINUED | OUTPATIENT
Start: 2024-04-01 | End: 2024-04-05 | Stop reason: HOSPADM

## 2024-04-01 RX ORDER — ENOXAPARIN SODIUM 100 MG/ML
40 INJECTION SUBCUTANEOUS DAILY
Status: DISCONTINUED | OUTPATIENT
Start: 2024-04-02 | End: 2024-04-02

## 2024-04-01 RX ORDER — METOPROLOL TARTRATE 1 MG/ML
5 INJECTION, SOLUTION INTRAVENOUS EVERY 8 HOURS PRN
Status: DISCONTINUED | OUTPATIENT
Start: 2024-04-01 | End: 2024-04-05 | Stop reason: HOSPADM

## 2024-04-01 RX ORDER — HYDROMORPHONE HYDROCHLORIDE 2 MG/1
1 TABLET ORAL ONCE
Status: DISCONTINUED | OUTPATIENT
Start: 2024-04-01 | End: 2024-04-01

## 2024-04-01 RX ORDER — POLYETHYLENE GLYCOL 3350 17 G/17G
17 POWDER, FOR SOLUTION ORAL DAILY PRN
Status: DISCONTINUED | OUTPATIENT
Start: 2024-04-01 | End: 2024-04-03 | Stop reason: SDUPTHER

## 2024-04-01 RX ORDER — ACETAMINOPHEN 325 MG/1
650 TABLET ORAL EVERY 6 HOURS PRN
Status: DISCONTINUED | OUTPATIENT
Start: 2024-04-01 | End: 2024-04-05 | Stop reason: HOSPADM

## 2024-04-01 RX ORDER — POTASSIUM CHLORIDE 7.45 MG/ML
10 INJECTION INTRAVENOUS PRN
Status: DISCONTINUED | OUTPATIENT
Start: 2024-04-01 | End: 2024-04-05 | Stop reason: HOSPADM

## 2024-04-01 RX ORDER — MORPHINE SULFATE 2 MG/ML
1 INJECTION, SOLUTION INTRAMUSCULAR; INTRAVENOUS EVERY 4 HOURS PRN
Status: DISCONTINUED | OUTPATIENT
Start: 2024-04-01 | End: 2024-04-05 | Stop reason: HOSPADM

## 2024-04-01 RX ORDER — ONDANSETRON 2 MG/ML
4 INJECTION INTRAMUSCULAR; INTRAVENOUS EVERY 6 HOURS PRN
Status: DISCONTINUED | OUTPATIENT
Start: 2024-04-01 | End: 2024-04-05 | Stop reason: HOSPADM

## 2024-04-01 RX ADMIN — Medication 10 ML: at 23:13

## 2024-04-01 RX ADMIN — DILTIAZEM HYDROCHLORIDE 10 MG: 5 INJECTION, SOLUTION INTRAVENOUS at 21:11

## 2024-04-01 RX ADMIN — FENTANYL CITRATE 25 MCG: 50 INJECTION INTRAMUSCULAR; INTRAVENOUS at 18:54

## 2024-04-01 RX ADMIN — ONDANSETRON 4 MG: 2 INJECTION INTRAMUSCULAR; INTRAVENOUS at 18:54

## 2024-04-01 RX ADMIN — METOPROLOL TARTRATE 5 MG: 1 INJECTION, SOLUTION INTRAVENOUS at 23:08

## 2024-04-01 RX ADMIN — FENTANYL CITRATE 25 MCG: 50 INJECTION INTRAMUSCULAR; INTRAVENOUS at 20:16

## 2024-04-01 RX ADMIN — HYDROMORPHONE HYDROCHLORIDE 0.5 MG: 1 INJECTION, SOLUTION INTRAMUSCULAR; INTRAVENOUS; SUBCUTANEOUS at 21:07

## 2024-04-01 RX ADMIN — ACETAMINOPHEN 650 MG: 325 TABLET ORAL at 23:09

## 2024-04-01 ASSESSMENT — PAIN DESCRIPTION - LOCATION
LOCATION: HIP
LOCATION: LEG
LOCATION: HIP
LOCATION: HIP
LOCATION: LEG
LOCATION: HIP
LOCATION: HIP

## 2024-04-01 ASSESSMENT — PAIN DESCRIPTION - DESCRIPTORS
DESCRIPTORS: SPASM
DESCRIPTORS: ACHING
DESCRIPTORS: THROBBING;SPASM
DESCRIPTORS: STABBING;THROBBING
DESCRIPTORS: ACHING

## 2024-04-01 ASSESSMENT — PAIN DESCRIPTION - PAIN TYPE
TYPE: ACUTE PAIN

## 2024-04-01 ASSESSMENT — PAIN DESCRIPTION - ORIENTATION
ORIENTATION: RIGHT

## 2024-04-01 ASSESSMENT — PAIN SCALES - GENERAL
PAINLEVEL_OUTOF10: 2
PAINLEVEL_OUTOF10: 10
PAINLEVEL_OUTOF10: 10
PAINLEVEL_OUTOF10: 6
PAINLEVEL_OUTOF10: 4
PAINLEVEL_OUTOF10: 7
PAINLEVEL_OUTOF10: 6
PAINLEVEL_OUTOF10: 3

## 2024-04-01 ASSESSMENT — PAIN DESCRIPTION - ONSET
ONSET: ON-GOING

## 2024-04-01 ASSESSMENT — PAIN - FUNCTIONAL ASSESSMENT
PAIN_FUNCTIONAL_ASSESSMENT: INTOLERABLE, UNABLE TO DO ANY ACTIVE OR PASSIVE ACTIVITIES
PAIN_FUNCTIONAL_ASSESSMENT: 0-10
PAIN_FUNCTIONAL_ASSESSMENT: PREVENTS OR INTERFERES WITH ALL ACTIVE AND SOME PASSIVE ACTIVITIES
PAIN_FUNCTIONAL_ASSESSMENT: 0-10
PAIN_FUNCTIONAL_ASSESSMENT: PREVENTS OR INTERFERES WITH ALL ACTIVE AND SOME PASSIVE ACTIVITIES

## 2024-04-01 ASSESSMENT — PAIN DESCRIPTION - FREQUENCY
FREQUENCY: CONTINUOUS

## 2024-04-01 ASSESSMENT — LIFESTYLE VARIABLES
HOW MANY STANDARD DRINKS CONTAINING ALCOHOL DO YOU HAVE ON A TYPICAL DAY: PATIENT DOES NOT DRINK
HOW OFTEN DO YOU HAVE A DRINK CONTAINING ALCOHOL: NEVER

## 2024-04-01 ASSESSMENT — PAIN DESCRIPTION - DIRECTION: RADIATING_TOWARDS: THIGH

## 2024-04-01 NOTE — ED TRIAGE NOTES
Patient presents to ED via EMS complaining of fall. Right leg pain, internally rotated. States previous history of femur fracture on that side.

## 2024-04-02 ENCOUNTER — ANESTHESIA EVENT (OUTPATIENT)
Age: 84
End: 2024-04-02
Payer: MEDICARE

## 2024-04-02 PROBLEM — I48.91 ATRIAL FIBRILLATION WITH RVR (HCC): Status: ACTIVE | Noted: 2024-04-02

## 2024-04-02 LAB
ANION GAP SERPL CALCULATED.3IONS-SCNC: 9 MMOL/L (ref 3–16)
BASOPHILS # BLD: 0.03 K/UL (ref 0–0.2)
BASOPHILS NFR BLD: 0 %
BUN SERPL-MCNC: 16 MG/DL (ref 7–20)
CALCIUM SERPL-MCNC: 8.5 MG/DL (ref 8.3–10.6)
CHLORIDE SERPL-SCNC: 106 MMOL/L (ref 99–110)
CO2 SERPL-SCNC: 26 MMOL/L (ref 21–32)
CREAT SERPL-MCNC: 0.7 MG/DL (ref 0.6–1.2)
EKG DIAGNOSIS: NORMAL
EKG DIAGNOSIS: NORMAL
EKG Q-T INTERVAL: 282 MS
EKG Q-T INTERVAL: 362 MS
EKG QRS DURATION: 74 MS
EKG QRS DURATION: 76 MS
EKG QTC CALCULATION (BAZETT): 436 MS
EKG QTC CALCULATION (BAZETT): 440 MS
EKG R AXIS: 39 DEGREES
EKG R AXIS: 41 DEGREES
EKG T AXIS: -2 DEGREES
EKG T AXIS: 251 DEGREES
EKG VENTRICULAR RATE: 144 BPM
EKG VENTRICULAR RATE: 89 BPM
EOSINOPHIL # BLD: 0.1 K/UL (ref 0–0.6)
EOSINOPHILS RELATIVE PERCENT: 1 %
ERYTHROCYTE [DISTWIDTH] IN BLOOD BY AUTOMATED COUNT: 12.8 % (ref 12.4–15.4)
GFR SERPL CREATININE-BSD FRML MDRD: 86 ML/MIN/1.73M2
GLUCOSE SERPL-MCNC: 123 MG/DL (ref 70–99)
HCT VFR BLD AUTO: 44.8 % (ref 36–48)
HGB BLD-MCNC: 14.6 G/DL (ref 12–16)
IMM GRANULOCYTES # BLD AUTO: 0.02 K/UL (ref 0–0.5)
IMM GRANULOCYTES NFR BLD: 0 %
LYMPHOCYTES NFR BLD: 2.05 K/UL (ref 1–5.1)
LYMPHOCYTES RELATIVE PERCENT: 17 %
MCH RBC QN AUTO: 31.7 PG (ref 26–34)
MCHC RBC AUTO-ENTMCNC: 32.6 G/DL (ref 31–36)
MCV RBC AUTO: 97.4 FL (ref 80–100)
MONOCYTES NFR BLD: 0.86 K/UL (ref 0–1.3)
MONOCYTES NFR BLD: 7 %
NEUTROPHILS NFR BLD: 75 %
NEUTS SEG NFR BLD: 8.98 K/UL (ref 1.7–7.7)
PLATELET # BLD AUTO: 184 K/UL (ref 135–450)
PMV BLD AUTO: 11.1 FL
POTASSIUM SERPL-SCNC: 4.1 MMOL/L (ref 3.5–5.1)
RBC # BLD AUTO: 4.6 M/UL (ref 4–5.2)
SODIUM SERPL-SCNC: 141 MMOL/L (ref 136–145)
WBC OTHER # BLD: 12 K/UL (ref 4–11)

## 2024-04-02 PROCEDURE — 6360000002 HC RX W HCPCS: Performed by: ORTHOPAEDIC SURGERY

## 2024-04-02 PROCEDURE — 94761 N-INVAS EAR/PLS OXIMETRY MLT: CPT

## 2024-04-02 PROCEDURE — C8929 TTE W OR WO FOL WCON,DOPPLER: HCPCS

## 2024-04-02 PROCEDURE — 1200000000 HC SEMI PRIVATE

## 2024-04-02 PROCEDURE — 85025 COMPLETE CBC W/AUTO DIFF WBC: CPT

## 2024-04-02 PROCEDURE — 2500000003 HC RX 250 WO HCPCS: Performed by: STUDENT IN AN ORGANIZED HEALTH CARE EDUCATION/TRAINING PROGRAM

## 2024-04-02 PROCEDURE — 2700000000 HC OXYGEN THERAPY PER DAY

## 2024-04-02 PROCEDURE — 99222 1ST HOSP IP/OBS MODERATE 55: CPT | Performed by: INTERNAL MEDICINE

## 2024-04-02 PROCEDURE — 36415 COLL VENOUS BLD VENIPUNCTURE: CPT

## 2024-04-02 PROCEDURE — 2580000003 HC RX 258: Performed by: STUDENT IN AN ORGANIZED HEALTH CARE EDUCATION/TRAINING PROGRAM

## 2024-04-02 PROCEDURE — 6370000000 HC RX 637 (ALT 250 FOR IP): Performed by: STUDENT IN AN ORGANIZED HEALTH CARE EDUCATION/TRAINING PROGRAM

## 2024-04-02 PROCEDURE — 80048 BASIC METABOLIC PNL TOTAL CA: CPT

## 2024-04-02 PROCEDURE — 6370000000 HC RX 637 (ALT 250 FOR IP): Performed by: HOSPITALIST

## 2024-04-02 RX ORDER — SODIUM CHLORIDE 9 MG/ML
INJECTION, SOLUTION INTRAVENOUS CONTINUOUS
Status: ACTIVE | OUTPATIENT
Start: 2024-04-02 | End: 2024-04-02

## 2024-04-02 RX ORDER — ASPIRIN 81 MG/1
81 TABLET, CHEWABLE ORAL DAILY
Status: DISCONTINUED | OUTPATIENT
Start: 2024-04-02 | End: 2024-04-05 | Stop reason: HOSPADM

## 2024-04-02 RX ORDER — METOPROLOL TARTRATE 1 MG/ML
5 INJECTION, SOLUTION INTRAVENOUS EVERY 5 MIN PRN
Status: COMPLETED | OUTPATIENT
Start: 2024-04-02 | End: 2024-04-02

## 2024-04-02 RX ORDER — ENOXAPARIN SODIUM 100 MG/ML
40 INJECTION SUBCUTANEOUS ONCE
Status: COMPLETED | OUTPATIENT
Start: 2024-04-02 | End: 2024-04-02

## 2024-04-02 RX ORDER — 0.9 % SODIUM CHLORIDE 0.9 %
500 INTRAVENOUS SOLUTION INTRAVENOUS ONCE
Status: COMPLETED | OUTPATIENT
Start: 2024-04-02 | End: 2024-04-02

## 2024-04-02 RX ORDER — DILTIAZEM HCL IN NACL,ISO-OSM 125 MG/125
2.5-15 PLASTIC BAG, INJECTION (ML) INTRAVENOUS ONCE
Status: DISCONTINUED | OUTPATIENT
Start: 2024-04-02 | End: 2024-04-02 | Stop reason: CLARIF

## 2024-04-02 RX ORDER — PRAVASTATIN SODIUM 20 MG
40 TABLET ORAL DAILY
Status: DISCONTINUED | OUTPATIENT
Start: 2024-04-02 | End: 2024-04-05 | Stop reason: HOSPADM

## 2024-04-02 RX ORDER — DILTIAZEM HCL IN NACL,ISO-OSM 125 MG/125
2.5-15 PLASTIC BAG, INJECTION (ML) INTRAVENOUS CONTINUOUS
Status: DISCONTINUED | OUTPATIENT
Start: 2024-04-02 | End: 2024-04-04

## 2024-04-02 RX ORDER — METOPROLOL SUCCINATE 100 MG/1
100 TABLET, EXTENDED RELEASE ORAL DAILY
Status: DISCONTINUED | OUTPATIENT
Start: 2024-04-02 | End: 2024-04-05 | Stop reason: HOSPADM

## 2024-04-02 RX ADMIN — OXYCODONE 5 MG: 5 TABLET ORAL at 11:20

## 2024-04-02 RX ADMIN — PRAVASTATIN SODIUM 40 MG: 20 TABLET ORAL at 09:17

## 2024-04-02 RX ADMIN — METOPROLOL TARTRATE 5 MG: 1 INJECTION, SOLUTION INTRAVENOUS at 00:43

## 2024-04-02 RX ADMIN — OXYCODONE 5 MG: 5 TABLET ORAL at 16:57

## 2024-04-02 RX ADMIN — METOPROLOL SUCCINATE 100 MG: 100 TABLET, EXTENDED RELEASE ORAL at 09:17

## 2024-04-02 RX ADMIN — SODIUM CHLORIDE 500 ML: 9 INJECTION, SOLUTION INTRAVENOUS at 00:40

## 2024-04-02 RX ADMIN — ASPIRIN 81 MG 81 MG: 81 TABLET ORAL at 15:02

## 2024-04-02 RX ADMIN — POTASSIUM CHLORIDE 40 MEQ: 1500 TABLET, EXTENDED RELEASE ORAL at 00:05

## 2024-04-02 RX ADMIN — Medication 10 ML: at 20:38

## 2024-04-02 RX ADMIN — ACETAMINOPHEN 650 MG: 325 TABLET ORAL at 06:19

## 2024-04-02 RX ADMIN — ACETAMINOPHEN 650 MG: 325 TABLET ORAL at 13:23

## 2024-04-02 RX ADMIN — ENOXAPARIN SODIUM 40 MG: 100 INJECTION SUBCUTANEOUS at 09:17

## 2024-04-02 RX ADMIN — ACETAMINOPHEN 650 MG: 325 TABLET ORAL at 20:37

## 2024-04-02 RX ADMIN — SODIUM CHLORIDE: 9 INJECTION, SOLUTION INTRAVENOUS at 03:20

## 2024-04-02 ASSESSMENT — PAIN DESCRIPTION - PAIN TYPE
TYPE: ACUTE PAIN

## 2024-04-02 ASSESSMENT — PAIN DESCRIPTION - LOCATION
LOCATION: HIP

## 2024-04-02 ASSESSMENT — PAIN DESCRIPTION - ONSET
ONSET: ON-GOING

## 2024-04-02 ASSESSMENT — PAIN - FUNCTIONAL ASSESSMENT
PAIN_FUNCTIONAL_ASSESSMENT: ACTIVITIES ARE NOT PREVENTED

## 2024-04-02 ASSESSMENT — PAIN DESCRIPTION - DESCRIPTORS
DESCRIPTORS: DISCOMFORT;SHARP
DESCRIPTORS: DISCOMFORT
DESCRIPTORS: ACHING
DESCRIPTORS: DISCOMFORT;SHARP
DESCRIPTORS: DISCOMFORT

## 2024-04-02 ASSESSMENT — PAIN DESCRIPTION - FREQUENCY
FREQUENCY: INTERMITTENT

## 2024-04-02 ASSESSMENT — PAIN SCALES - GENERAL
PAINLEVEL_OUTOF10: 3
PAINLEVEL_OUTOF10: 0
PAINLEVEL_OUTOF10: 2
PAINLEVEL_OUTOF10: 3
PAINLEVEL_OUTOF10: 0
PAINLEVEL_OUTOF10: 6
PAINLEVEL_OUTOF10: 6

## 2024-04-02 ASSESSMENT — PAIN DESCRIPTION - ORIENTATION
ORIENTATION: RIGHT

## 2024-04-02 NOTE — CONSULTS
Department of Orthopedic Surgery  Physician Assistant   Consult Note        Reason for Consult:  right hip pain  Requesting Physician: Jose Queen MD  Date of Service: 4/2/2024 6:13 PM    CHIEF COMPLAINT:  As Above    History Obtained From:  patient    HISTORY OF PRESENT ILLNESS:                The patient is a 83 y.o. female who presents with above chief complaint.  Pt fell last night and landed on her right side.  Had immediate pain in the right hip and was unable to get up or walk after.  No prior hip injuries.  Had a relatively recent tibial plateau that was fixed on the same leg and has been doing okay since then.  No n/t in the leg.  Has history of a-fib and not on anticoagulation due to fall risk.  She has very poor vision as well with macular degeneration.  Lives alone.  Son here today.    Past Medical History:        Diagnosis Date    A-fib (HCC)     Hyperlipidemia     Hypertension     Macular degeneration      Past Surgical History:    No past surgical history on file.      Medications Prior to Admission:   Prior to Admission medications    Medication Sig Start Date End Date Taking? Authorizing Provider   metoprolol succinate (TOPROL XL) 100 MG extended release tablet Take 1 tablet by mouth daily 1/3/24  Yes Edelmira Laird MD   pravastatin (PRAVACHOL) 40 MG tablet Take 1 tablet by mouth daily 12/29/23  Yes Edelmira Laird MD   hydroCHLOROthiazide (HYDRODIURIL) 25 MG tablet Take 0.5 tablets by mouth daily 12/29/23  Yes Edelmira Laird MD   Multiple Vitamins-Minerals (THERA M PLUS) TABS Take 1 tablet by mouth daily    Edelmira Laird MD   vitamin E 200 units capsule Take 1 capsule by mouth daily    Edelmira Laird MD   vitamin D (CHOLECALCIFEROL) 25 MCG (1000 UT) TABS tablet Take 1 tablet by mouth daily    Edelmira Laird MD       Allergies:  Pcn [penicillins]    Social History:    Tobacco:  reports that she has never smoked. She has never used smokeless

## 2024-04-02 NOTE — PLAN OF CARE
Problem: Skin/Tissue Integrity  Goal: Absence of new skin breakdown  Description: 1.  Monitor for areas of redness and/or skin breakdown  2.  Assess vascular access sites hourly  3.  Every 4-6 hours minimum:  Change oxygen saturation probe site  4.  Every 4-6 hours:  If on nasal continuous positive airway pressure, respiratory therapy assess nares and determine need for appliance change or resting period.  Outcome: Progressing     Problem: Discharge Planning  Goal: Discharge to home or other facility with appropriate resources  Outcome: Progressing  Flowsheets (Taken 4/2/2024 0000)  Discharge to home or other facility with appropriate resources:   Identify barriers to discharge with patient and caregiver   Identify discharge learning needs (meds, wound care, etc)   Refer to discharge planning if patient needs post-hospital services based on physician order or complex needs related to functional status, cognitive ability or social support system   Arrange for needed discharge resources and transportation as appropriate     Problem: Pain  Goal: Verbalizes/displays adequate comfort level or baseline comfort level  Outcome: Progressing  Flowsheets (Taken 4/2/2024 0000)  Verbalizes/displays adequate comfort level or baseline comfort level:   Encourage patient to monitor pain and request assistance   Administer analgesics based on type and severity of pain and evaluate response   Consider cultural and social influences on pain and pain management   Assess pain using appropriate pain scale   Implement non-pharmacological measures as appropriate and evaluate response   Notify Licensed Independent Practitioner if interventions unsuccessful or patient reports new pain     Problem: Safety - Adult  Goal: Free from fall injury  Outcome: Progressing  Flowsheets (Taken 4/2/2024 0000)  Free From Fall Injury:   Instruct family/caregiver on patient safety   Based on caregiver fall risk screen, instruct family/caregiver to ask for

## 2024-04-02 NOTE — PROGRESS NOTES
[facility]    Hospitalist Progress Note:      Name:  Charline Renner /Age/Sex: 1940  (83 y.o. female)   MRN & CSN:  [id number] & [id number] Encounter Date: 2024    Location:  3223/3223-01 PCP: Denny Jensen     Attending:Angela Sheets MD  Admission Date: 2024      Hospital Day: 2    Assessment:  Charline Renner is a 83 y.o. female with a pmh of HTN, A.fib not on AC due to hx of bleeding who presents after a mechanical fall and noted to have R intertrochanteric femur fracture.  S/p fall, mechanical: Patient denies any loss of consciousness  R intertrochanteric femur fracture: Right hip and right thigh pain.  Paroxysmal A-fib: A-fib with RVR in ER currently converted to sinus rhythm.  Patient is not on chronic anticoagulation due to history of GI bleeding.  Patient reports underwent colonoscopy and capsule endoscopy without any source of bleeding.    Hypertension  Hyperlipidemia  Generalized physical deconditioning    Plan:   Telemonitoring, IV Cardizem drip, wean as able for ventricular rate less than 100.  Continue metoprolol.  F/u cardiology team input for cardiology clearance and Afib mx. Follow-up echocardiogram. Cont ASA, statin  Noted orthopedic surgery team, plan for or tomorrow for right hip fracture.  Gentle hydration, pain control  Current meds reviewed, adjusted.   See orders  Diet Diet NPO  ADULT DIET; Regular   DVT Prophylaxis [x] Lovenox, []  Heparin, [] SCDs, [] Ambulation,  [] Eliquis, [] Xarelto  [] Coumadin   Code Status Full Code   Disposition Expected Disposition: Home vs ECF vs Hm with [facility]  Estimated Date of Discharge:  1-2 days  Reason for continued admission: Right hip fracture     Subjective:  Pt. seen and examined at bedside.  Overall symptoms are resolving.  States mild pain in his right hip and right thigh area.  Denies any tingling or numbness in the right lower extremity.  Son at bedside.  Denies any chest pain/Cough/SOB/Abd pain/ N/V/Diarrhea.   No HA/Dizziness  prominent disc osteophyte complex. Moderate to severe multilevel neural foraminal narrowing, most advanced at C4-5 and C6-7. No paraspinal soft tissue abnormality.     CT brain: No acute intracranial hemorrhage or mass effect. No calvarial fracture. CT cervical spine: No acute vertebral fracture or traumatic subluxation.  Electronically signed by Tirso Luo MD    XR CHEST PORTABLE    Result Date: 4/1/2024  EXAM: XR CHEST PORTABLE INDICATION: 83 years Female; \"hip fx\" COMPARISON: None FINDINGS: Enlarged cardiac silhouette. No consolidation, pleural effusion, or pneumothorax. No acute osseous abnormality.     Mildly enlarged cardiac silhouette. Electronically signed by Tirso Luo MD    XR FEMUR RIGHT (MIN 2 VIEWS)    Result Date: 4/1/2024  Exam: XR FEMUR RIGHT (MIN 2 VIEWS) History: 83 years Female; \"fall hip and thigh pain\". Comparison: None available Findings: Comminuted displaced intertrochanteric right femur fracture. No dislocation. No osseous erosion. No significant soft tissue abnormality. Proximal tibial fixation hardware.     Impression: Displaced comminuted intertrochanteric right femur fracture. Electronically signed by Tirso Luo MD      Cultures:  Organism: No results found for: \"ORG\"      Electronically signed by: Electronically signed by Jose Queen MD on 4/2/2024 at 1:57 PM, 4/2/2024 1:57 PM

## 2024-04-02 NOTE — FLOWSHEET NOTE
04/02/24 0735   Vital Signs   Temp 98.2 °F (36.8 °C)   Temp Source Oral   Pulse 80   Heart Rate Source Monitor   Respirations 16   BP (!) 124/50   MAP (Calculated) 75   MAP (mmHg) 73   BP Location Left upper arm   BP Method Automatic   Patient Position Semi fowlers   Oxygen Therapy   SpO2 98 %   O2 Device Nasal cannula   O2 Flow Rate (L/min) 2 L/min     Shift assessment completed, patient awake and alert sitting up in bed. Patient denies any pain, harmon catheter is patent and draining, patient denies any further concerns at this time, son at bedside. Call light within reach.

## 2024-04-02 NOTE — PROGRESS NOTES
Upon decision to transfer to ICU for Cardizem drip, pt spontaneously converted to SR with HR in 80's. Decision by hospitalist to continue monitoring on current floor. Started maintenance fluids per order.

## 2024-04-02 NOTE — CONSULTS
[facility]   CONSULTATION  [phone]      Chief Complaint   Patient presents with    Fall    Leg Pain            History of Present Illness:  Charline Renner is a 83 y.o. patient who presented to the hospital after a fall at home. She lives with her son who is here with her. She states she has a history of CAD and afib. She follows with cardiology at [facility]. She had previously been on eliquis but it was stopped d/t GIB and she was started on asa 81. She denies any chest pain or pressure. She has a hip fx and is awaiting surgical repair by ortho. She presented in afib with RVR but converted to NSR overnight. Dyspneic while in RVR but she has been comfortable since conversion last night.  I have been asked to provide consultation regarding further management and testing.      Past Medical History:   has a past medical history of A-fib (HCC), Hyperlipidemia, Hypertension, and Macular degeneration.    Surgical History:   has no past surgical history on file.     Social History:   reports that she has never smoked. She has never used smokeless tobacco.     Family History:  family history is not on file.     Home Medications:  Were reviewed and are listed in nursing record. and/or listed below  Prior to Admission medications    Medication Sig Start Date End Date Taking? Authorizing Provider   metoprolol succinate (TOPROL XL) 100 MG extended release tablet Take 1 tablet by mouth daily 1/3/24  Yes Edelmira Laird MD   pravastatin (PRAVACHOL) 40 MG tablet Take 1 tablet by mouth daily 12/29/23  Yes Edelmira Laird MD   hydroCHLOROthiazide (HYDRODIURIL) 25 MG tablet Take 0.5 tablets by mouth daily 12/29/23  Yes Edelmira Laird MD   Multiple Vitamins-Minerals (THERA M PLUS) TABS Take 1 tablet by mouth daily    Edelmira Laird MD   vitamin E 200 units capsule Take 1 capsule by mouth daily    Edelmira Laird MD   vitamin D (CHOLECALCIFEROL) 25 MCG (1000 UT) TABS tablet Take 1 tablet by

## 2024-04-02 NOTE — CARE COORDINATION
Case Management Assessment  Initial Evaluation    Date/Time of Evaluation: 4/2/2024 11:49 AM  Assessment Completed by: ABHISHEK Browning    If patient is discharged prior to next notation, then this note serves as note for discharge by case management.    Patient Name: Charline Renner                   YOB: 1940  Diagnosis: Intertrochanteric fracture of right femur, closed, initial encounter ([facility]) [S72.141A]                   Date / Time: 4/1/2024  6:33 PM    Patient Admission Status: Inpatient   Readmission Risk (Low < 19, Mod (19-27), High > 27): Readmission Risk Score: 10.9    Current PCP: Denny Jensen  PCP verified by CM? Yes    Chart Reviewed: Yes      History Provided by: Patient  Patient Orientation: Alert and Oriented    Patient Cognition: Alert    Hospitalization in the last 30 days (Readmission):  No    If yes, Readmission Assessment in  Navigator will be completed.    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.) Type of Home: House  Primary Care Giver: Self  Patient Support Systems include: None   Current Financial resources: Medicare  Current community resources: None  Current services prior to admission: None            Current DME:              Type of Home Care services:  None    ADLS  Prior functional level: Independent in ADLs/IADLs  Current functional level: Assistance with the following:, Mobility, Bathing, Dressing, Cooking, Toileting, Housework, Shopping    PT AM-PAC:   /24  OT AM-PAC:   /24    Family can provide assistance at DC: Yes  Would you like Case Management to discuss the discharge plan with any other family members/significant others, and if so, who? No  Plans to Return to Present Housing: Unknown at present  Other Identified Issues/Barriers to RETURNING to current housing: No  Potential Assistance

## 2024-04-02 NOTE — ASSESSMENT & PLAN NOTE
Borderline controlled, continue current medications and despite given Cardizem push and IV lopressor X2 still has HR in the 150s to 160s.
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
## 2024-04-02 NOTE — ED NOTES
ED TO INPATIENT SBAR HANDOFF    Patient Name: Charline Renner   :  1940  83 y.o.   MRN:  [id number]  Preferred Name     ED Room #:    Family/Caregiver Present yes   Restraints no   Sitter no   Sepsis Risk Score Sepsis Risk Score: 4.1    Situation  Code Status: No Order No additional code details.    Allergies: Pcn [penicillins]  Weight: Patient Vitals for the past 96 hrs (Last 3 readings):   Weight   24 1834 73.3 kg (161 lb 11.2 oz)     Arrived from: home  Chief Complaint:   Chief Complaint   Patient presents with    Fall    Leg Pain     Hospital Problem/Diagnosis:  Principal Problem:    Intertrochanteric fracture of right femur, closed, initial encounter ([facility])  Resolved Problems:    * No resolved hospital problems. *    Imaging:   XR CHEST PORTABLE   Final Result      Mildly enlarged cardiac silhouette.      Electronically signed by Tirso Luo MD      XR FEMUR RIGHT (MIN 2 VIEWS)   Final Result   Impression:    Displaced comminuted intertrochanteric right femur fracture.      Electronically signed by Tirso Luo MD      CT HEAD WO CONTRAST    (Results Pending)   CT CERVICAL SPINE WO CONTRAST    (Results Pending)     Abnormal labs:   Abnormal Labs Reviewed   CBC WITH AUTO DIFFERENTIAL - Abnormal; Notable for the following components:       Result Value    WBC 14.1 (*)     Immature Granulocytes 1 (*)     Neutrophils Absolute 10.94 (*)     All other components within normal limits   BASIC METABOLIC PANEL W/ REFLEX TO MG FOR LOW K - Abnormal; Notable for the following components:    Glucose 136 (*)     All other components within normal limits     Critical values: no     Abnormal Assessment Findings:      Background  History:   Past Medical History:   Diagnosis Date    A-fib ([facility])     Hyperlipidemia     Hypertension     Macular degeneration        Assessment    Vitals/MEWS:    Level of Consciousness: Alert (0)   Vitals:    24 2004 24 2034 24 2100 24 2139   BP: (!) 162/48 (!) 160/90  (!) 156/90    Pulse: 89 89 (!) 169 (!) 129   Resp: 15 14 16 17   Temp:       SpO2: 98% 97% 98% 95%   Weight:         FiO2 (%): desaturation  O2 Flow Rate: O2 Device: Nasal cannula O2 Flow Rate (L/min): 2 L/min  Cardiac Rhythm:    Pain Assessment:   [x] Verbal [] Alvarez Arnold Scale  Pain Scale: Pain Assessment  Pain Assessment: 0-10  Pain Level: 10  Patient's Stated Pain Goal: 0 - No pain  Pain Location: Hip  Pain Orientation: Right  Pain Descriptors: Stabbing, Throbbing  Last documented pain score (0-10 scale) Pain Level: 10  Last documented pain medication administered:    Mental Status: oriented and alert  Orientation Level:    NIH Score:    C-SSRS: Risk of Suicide: No Risk  Bedside swallow:    Artem Coma Scale (GCS): Artem Coma Scale  Eye Opening: Spontaneous  Best Verbal Response: Oriented  Best Motor Response: Obeys commands  [place] Coma Scale Score: 15  Active LDA's:   Peripheral IV 04/01/24 Right Antecubital (Active)     PO Status: Regular  Pertinent or High Risk Medications/Drips: no   If Yes, please provide details:    Pending Blood Product Administration: no       You may also review the ED PT Care Timeline found under the Summary Nursing Index tab.    Recommendation    Pending orders    Plan for Discharge (if known):   Additional Comments:     If any further questions, please call Sending RN at      Electronically signed by: Electronically signed by Tamanna Cramer RN on 4/1/2024 at 9:42 PM

## 2024-04-02 NOTE — PLAN OF CARE
Problem: Discharge Planning  Goal: Discharge to home or other facility with appropriate resources  4/2/2024 1925 by Sushant Loza, RN  Outcome: Progressing  Flowsheets (Taken 4/2/2024 1925)  Discharge to home or other facility with appropriate resources:   Identify barriers to discharge with patient and caregiver   Identify discharge learning needs (meds, wound care, etc)   Refer to discharge planning if patient needs post-hospital services based on physician order or complex needs related to functional status, cognitive ability or social support system   Arrange for needed discharge resources and transportation as appropriate     Problem: Pain  Goal: Verbalizes/displays adequate comfort level or baseline comfort level  4/2/2024 1925 by Sushant Loza, RN  Outcome: Progressing  Flowsheets (Taken 4/2/2024 1925)  Verbalizes/displays adequate comfort level or baseline comfort level:   Encourage patient to monitor pain and request assistance   Administer analgesics based on type and severity of pain and evaluate response   Consider cultural and social influences on pain and pain management   Assess pain using appropriate pain scale   Implement non-pharmacological measures as appropriate and evaluate response   Notify Licensed Independent Practitioner if interventions unsuccessful or patient reports new pain     Problem: Skin/Tissue Integrity  Goal: Absence of new skin breakdown  Description: 1.  Monitor for areas of redness and/or skin breakdown  2.  Assess vascular access sites hourly  3.  Every 4-6 hours minimum:  Change oxygen saturation probe site  4.  Every 4-6 hours:  If on nasal continuous positive airway pressure, respiratory therapy assess nares and determine need for appliance change or resting period.  4/2/2024 1925 by Sushant Loza, RN  Outcome: Progressing     Problem: Safety - Adult  Goal: Free from fall injury  4/2/2024 1925 by Sushant Loza, RN  Outcome: Progressing  Flowsheets (Taken 4/2/2024

## 2024-04-02 NOTE — PLAN OF CARE
Full consult note to follow    Xrays reviewed. Right intertorchanteric hip fracture    Charline Renner is a 83 y.o. female who presented to [facility] after sustaining fall. She lives at home with , who has dementia. Has history of Afib, not on anticoagulation due to history of bleeding. Was in Afib with RVR that initially could not be reversed with cardizem and lopressor. Spontaneously resolved as she wa about to be transferred to the ICU.    Plan:  --Recommend operative fixation of right hip fracture.  --Symptomatic pain control  --Bedrest  -- N.p.o./IVF  --Hold anticoagulation in anticipation for OR today  -- Patient has been admitted and seen by the hospitalist service.  Cardiology consult requested and pending.  --To OR later today vs tomorrow AM if medically cleared and pending OR availability.      Electronically signed by Manjinder Negro MD on 4/2/2024 at 7:35 AM         ADDENDUM:  Discussed with anesthesiologist.  Plan for OR tomorrow 7:30 AM.  Regular diet.  N.p.o. after midnight.  Ordered dose of Lovenox for this morning for DVT prophylaxis.      Electronically signed by Manjinder Negro MD on 4/2/2024 at 8:01 AM

## 2024-04-02 NOTE — PROGRESS NOTES
4 Eyes Skin Assessment     NAME:  Charline Renner  YOB: 1940  MEDICAL RECORD NUMBER:  [id number]    The patient is being assessed for  Admission    I agree that at least one RN has performed a thorough Head to Toe Skin Assessment on the patient. ALL assessment sites listed below have been assessed.      Areas assessed by both nurses:    Head, Face, Ears, Shoulders, Back, Chest, Arms, Elbows, Hands, Sacrum. Buttock, Coccyx, Ischium, Legs. Feet and Heels, and Under Medical Devices         Does the Patient have a Wound? No noted wound(s)       Sherif Prevention initiated by RN: No  Wound Care Orders initiated by RN: No    Pressure Injury (Stage 3,4, Unstageable, DTI, NWPT, and Complex wounds) if present, place Wound referral order by RN under : No    New Ostomies, if present place, Ostomy referral order under : No     Nurse 1 eSignature: Electronically signed by SHANTHI MAYEN RN on 4/1/24 at 11:59 PM EDT    **SHARE this note so that the co-signing nurse can place an eSignature**    Nurse 2 eSignature: Electronically signed by Sahra Oro RN on 4/2/24 at 12:02 AM EDT

## 2024-04-02 NOTE — PLAN OF CARE
Problem: Discharge Planning  Goal: Discharge to home or other facility with appropriate resources  4/2/2024 0943 by Zulay Ramirez RN  Outcome: Progressing  Flowsheets (Taken 4/2/2024 0943)  Discharge to home or other facility with appropriate resources:   Identify barriers to discharge with patient and caregiver   Identify discharge learning needs (meds, wound care, etc)   Arrange for needed discharge resources and transportation as appropriate  4/2/2024 0000 by Sushant Loza, RN  Outcome: Progressing  Flowsheets (Taken 4/2/2024 0000)  Discharge to home or other facility with appropriate resources:   Identify barriers to discharge with patient and caregiver   Identify discharge learning needs (meds, wound care, etc)   Refer to discharge planning if patient needs post-hospital services based on physician order or complex needs related to functional status, cognitive ability or social support system   Arrange for needed discharge resources and transportation as appropriate     Problem: Pain  Goal: Verbalizes/displays adequate comfort level or baseline comfort level  4/2/2024 0943 by Zulay Ramirez RN  Outcome: Progressing  Flowsheets (Taken 4/2/2024 0943)  Verbalizes/displays adequate comfort level or baseline comfort level:   Encourage patient to monitor pain and request assistance   Administer analgesics based on type and severity of pain and evaluate response   Assess pain using appropriate pain scale   Implement non-pharmacological measures as appropriate and evaluate response  4/2/2024 0000 by Sushant Loza, RN  Outcome: Progressing  Flowsheets (Taken 4/2/2024 0000)  Verbalizes/displays adequate comfort level or baseline comfort level:   Encourage patient to monitor pain and request assistance   Administer analgesics based on type and severity of pain and evaluate response   Consider cultural and social influences on pain and pain management   Assess pain using appropriate pain scale   Implement  non-pharmacological measures as appropriate and evaluate response   Notify Licensed Independent Practitioner if interventions unsuccessful or patient reports new pain     Problem: Skin/Tissue Integrity  Goal: Absence of new skin breakdown  Description: 1.  Monitor for areas of redness and/or skin breakdown  2.  Assess vascular access sites hourly  3.  Every 4-6 hours minimum:  Change oxygen saturation probe site  4.  Every 4-6 hours:  If on nasal continuous positive airway pressure, respiratory therapy assess nares and determine need for appliance change or resting period.  4/2/2024 0943 by Zulay Ramirez RN  Outcome: Progressing  4/2/2024 0000 by Sushant Loza RN  Outcome: Progressing     Problem: Safety - Adult  Goal: Free from fall injury  4/2/2024 0943 by Zulay Ramirez RN  Outcome: Progressing  Flowsheets (Taken 4/2/2024 0943)  Free From Fall Injury: Instruct family/caregiver on patient safety  4/2/2024 0000 by Sushant Loza RN  Outcome: Progressing  Flowsheets (Taken 4/2/2024 0000)  Free From Fall Injury:   Instruct family/caregiver on patient safety   Based on caregiver fall risk screen, instruct family/caregiver to ask for assistance with transferring infant if caregiver noted to have fall risk factors     Problem: ABCDS Injury Assessment  Goal: Absence of physical injury  4/2/2024 0943 by Zulay Ramirez RN  Outcome: Progressing  Flowsheets (Taken 4/2/2024 0943)  Absence of Physical Injury: Implement safety measures based on patient assessment  4/2/2024 0000 by Sushant Loza RN  Outcome: Progressing  Flowsheets (Taken 4/2/2024 0000)  Absence of Physical Injury: Implement safety measures based on patient assessment

## 2024-04-03 ENCOUNTER — APPOINTMENT (OUTPATIENT)
Age: 84
DRG: 481 | End: 2024-04-03
Payer: MEDICARE

## 2024-04-03 ENCOUNTER — ANESTHESIA (OUTPATIENT)
Age: 84
End: 2024-04-03
Payer: MEDICARE

## 2024-04-03 LAB
ANION GAP SERPL CALCULATED.3IONS-SCNC: 8 MMOL/L (ref 3–16)
BASOPHILS # BLD: 0.02 K/UL (ref 0–0.2)
BASOPHILS NFR BLD: 0 %
BUN SERPL-MCNC: 16 MG/DL (ref 7–20)
CALCIUM SERPL-MCNC: 8.5 MG/DL (ref 8.3–10.6)
CHLORIDE SERPL-SCNC: 104 MMOL/L (ref 99–110)
CO2 SERPL-SCNC: 27 MMOL/L (ref 21–32)
CREAT SERPL-MCNC: 0.7 MG/DL (ref 0.6–1.2)
EOSINOPHIL # BLD: 0.39 K/UL (ref 0–0.6)
EOSINOPHILS RELATIVE PERCENT: 4 %
ERYTHROCYTE [DISTWIDTH] IN BLOOD BY AUTOMATED COUNT: 12.9 % (ref 12.4–15.4)
GFR SERPL CREATININE-BSD FRML MDRD: 84 ML/MIN/1.73M2
GLUCOSE SERPL-MCNC: 120 MG/DL (ref 70–99)
HCT VFR BLD AUTO: 39.6 % (ref 36–48)
HCT VFR BLD AUTO: 44.2 % (ref 36–48)
HGB BLD-MCNC: 12.5 G/DL (ref 12–16)
HGB BLD-MCNC: 14.3 G/DL (ref 12–16)
IMM GRANULOCYTES # BLD AUTO: 0.02 K/UL (ref 0–0.5)
IMM GRANULOCYTES NFR BLD: 0 %
LYMPHOCYTES NFR BLD: 1.5 K/UL (ref 1–5.1)
LYMPHOCYTES RELATIVE PERCENT: 14 %
MCH RBC QN AUTO: 32 PG (ref 26–34)
MCHC RBC AUTO-ENTMCNC: 32.4 G/DL (ref 31–36)
MCV RBC AUTO: 98.9 FL (ref 80–100)
MONOCYTES NFR BLD: 0.9 K/UL (ref 0–1.3)
MONOCYTES NFR BLD: 8 %
NEUTROPHILS NFR BLD: 74 %
NEUTS SEG NFR BLD: 8.02 K/UL (ref 1.7–7.7)
PLATELET # BLD AUTO: 146 K/UL (ref 135–450)
PMV BLD AUTO: 10.7 FL
POTASSIUM SERPL-SCNC: 4.1 MMOL/L (ref 3.5–5.1)
RBC # BLD AUTO: 4.47 M/UL (ref 4–5.2)
SODIUM SERPL-SCNC: 139 MMOL/L (ref 136–145)
WBC OTHER # BLD: 10.9 K/UL (ref 4–11)

## 2024-04-03 PROCEDURE — 85025 COMPLETE CBC W/AUTO DIFF WBC: CPT

## 2024-04-03 PROCEDURE — 6360000002 HC RX W HCPCS: Performed by: ORTHOPAEDIC SURGERY

## 2024-04-03 PROCEDURE — 2500000003 HC RX 250 WO HCPCS: Performed by: ORTHOPAEDIC SURGERY

## 2024-04-03 PROCEDURE — 2580000003 HC RX 258: Performed by: ORTHOPAEDIC SURGERY

## 2024-04-03 PROCEDURE — 97162 PT EVAL MOD COMPLEX 30 MIN: CPT

## 2024-04-03 PROCEDURE — 85018 HEMOGLOBIN: CPT

## 2024-04-03 PROCEDURE — 2720000010 HC SURG SUPPLY STERILE: Performed by: ORTHOPAEDIC SURGERY

## 2024-04-03 PROCEDURE — C1713 ANCHOR/SCREW BN/BN,TIS/BN: HCPCS | Performed by: ORTHOPAEDIC SURGERY

## 2024-04-03 PROCEDURE — 2700000000 HC OXYGEN THERAPY PER DAY

## 2024-04-03 PROCEDURE — 94150 VITAL CAPACITY TEST: CPT

## 2024-04-03 PROCEDURE — 27245 TREAT THIGH FRACTURE: CPT | Performed by: ORTHOPAEDIC SURGERY

## 2024-04-03 PROCEDURE — 97530 THERAPEUTIC ACTIVITIES: CPT

## 2024-04-03 PROCEDURE — 97110 THERAPEUTIC EXERCISES: CPT

## 2024-04-03 PROCEDURE — 80048 BASIC METABOLIC PNL TOTAL CA: CPT

## 2024-04-03 PROCEDURE — 6370000000 HC RX 637 (ALT 250 FOR IP): Performed by: ORTHOPAEDIC SURGERY

## 2024-04-03 PROCEDURE — 85014 HEMATOCRIT: CPT

## 2024-04-03 PROCEDURE — 3700000000 HC ANESTHESIA ATTENDED CARE: Performed by: ORTHOPAEDIC SURGERY

## 2024-04-03 PROCEDURE — 76000 FLUOROSCOPY <1 HR PHYS/QHP: CPT

## 2024-04-03 PROCEDURE — 2500000003 HC RX 250 WO HCPCS

## 2024-04-03 PROCEDURE — 3600000004 HC SURGERY LEVEL 4 BASE: Performed by: ORTHOPAEDIC SURGERY

## 2024-04-03 PROCEDURE — 3600000014 HC SURGERY LEVEL 4 ADDTL 15MIN: Performed by: ORTHOPAEDIC SURGERY

## 2024-04-03 PROCEDURE — 7100000001 HC PACU RECOVERY - ADDTL 15 MIN: Performed by: ORTHOPAEDIC SURGERY

## 2024-04-03 PROCEDURE — 0QS636Z REPOSITION RIGHT UPPER FEMUR WITH INTRAMEDULLARY INTERNAL FIXATION DEVICE, PERCUTANEOUS APPROACH: ICD-10-PCS | Performed by: DERMATOLOGY

## 2024-04-03 PROCEDURE — 36415 COLL VENOUS BLD VENIPUNCTURE: CPT

## 2024-04-03 PROCEDURE — 2580000003 HC RX 258

## 2024-04-03 PROCEDURE — 6360000002 HC RX W HCPCS

## 2024-04-03 PROCEDURE — 7100000000 HC PACU RECOVERY - FIRST 15 MIN: Performed by: ORTHOPAEDIC SURGERY

## 2024-04-03 PROCEDURE — 1200000000 HC SEMI PRIVATE

## 2024-04-03 PROCEDURE — 2709999900 HC NON-CHARGEABLE SUPPLY: Performed by: ORTHOPAEDIC SURGERY

## 2024-04-03 PROCEDURE — 2580000003 HC RX 258: Performed by: ANESTHESIOLOGY

## 2024-04-03 PROCEDURE — 3700000001 HC ADD 15 MINUTES (ANESTHESIA): Performed by: ORTHOPAEDIC SURGERY

## 2024-04-03 PROCEDURE — 94761 N-INVAS EAR/PLS OXIMETRY MLT: CPT

## 2024-04-03 DEVICE — LAG SCREW
Type: IMPLANTABLE DEVICE | Site: HIP | Status: FUNCTIONAL
Brand: GAMMA

## 2024-04-03 RX ORDER — SODIUM CHLORIDE 0.9 % (FLUSH) 0.9 %
5-40 SYRINGE (ML) INJECTION EVERY 12 HOURS SCHEDULED
Status: DISCONTINUED | OUTPATIENT
Start: 2024-04-03 | End: 2024-04-03 | Stop reason: SDUPTHER

## 2024-04-03 RX ORDER — SODIUM CHLORIDE 0.9 % (FLUSH) 0.9 %
5-40 SYRINGE (ML) INJECTION PRN
Status: DISCONTINUED | OUTPATIENT
Start: 2024-04-03 | End: 2024-04-03 | Stop reason: HOSPADM

## 2024-04-03 RX ORDER — SUCCINYLCHOLINE/SOD CL,ISO/PF 100 MG/5ML
SYRINGE (ML) INTRAVENOUS PRN
Status: DISCONTINUED | OUTPATIENT
Start: 2024-04-03 | End: 2024-04-03 | Stop reason: SDUPTHER

## 2024-04-03 RX ORDER — ROCURONIUM BROMIDE 10 MG/ML
INJECTION, SOLUTION INTRAVENOUS PRN
Status: DISCONTINUED | OUTPATIENT
Start: 2024-04-03 | End: 2024-04-03 | Stop reason: SDUPTHER

## 2024-04-03 RX ORDER — BISACODYL 5 MG/1
5 TABLET, DELAYED RELEASE ORAL DAILY PRN
Status: DISCONTINUED | OUTPATIENT
Start: 2024-04-03 | End: 2024-04-05 | Stop reason: HOSPADM

## 2024-04-03 RX ORDER — SODIUM CHLORIDE 0.9 % (FLUSH) 0.9 %
5-40 SYRINGE (ML) INJECTION PRN
Status: DISCONTINUED | OUTPATIENT
Start: 2024-04-03 | End: 2024-04-03 | Stop reason: SDUPTHER

## 2024-04-03 RX ORDER — FENTANYL CITRATE 50 UG/ML
25 INJECTION, SOLUTION INTRAMUSCULAR; INTRAVENOUS EVERY 5 MIN PRN
Status: DISCONTINUED | OUTPATIENT
Start: 2024-04-03 | End: 2024-04-03 | Stop reason: HOSPADM

## 2024-04-03 RX ORDER — OXYCODONE HYDROCHLORIDE 5 MG/1
5 TABLET ORAL EVERY 4 HOURS PRN
Status: DISCONTINUED | OUTPATIENT
Start: 2024-04-03 | End: 2024-04-05 | Stop reason: HOSPADM

## 2024-04-03 RX ORDER — PROPOFOL 10 MG/ML
INJECTION, EMULSION INTRAVENOUS PRN
Status: DISCONTINUED | OUTPATIENT
Start: 2024-04-03 | End: 2024-04-03 | Stop reason: SDUPTHER

## 2024-04-03 RX ORDER — ONDANSETRON 2 MG/ML
4 INJECTION INTRAMUSCULAR; INTRAVENOUS
Status: DISCONTINUED | OUTPATIENT
Start: 2024-04-03 | End: 2024-04-03 | Stop reason: HOSPADM

## 2024-04-03 RX ORDER — NALOXONE HYDROCHLORIDE 0.4 MG/ML
INJECTION, SOLUTION INTRAMUSCULAR; INTRAVENOUS; SUBCUTANEOUS PRN
Status: DISCONTINUED | OUTPATIENT
Start: 2024-04-03 | End: 2024-04-03 | Stop reason: HOSPADM

## 2024-04-03 RX ORDER — ESMOLOL HYDROCHLORIDE 10 MG/ML
INJECTION INTRAVENOUS PRN
Status: DISCONTINUED | OUTPATIENT
Start: 2024-04-03 | End: 2024-04-03 | Stop reason: SDUPTHER

## 2024-04-03 RX ORDER — ONDANSETRON 2 MG/ML
INJECTION INTRAMUSCULAR; INTRAVENOUS PRN
Status: DISCONTINUED | OUTPATIENT
Start: 2024-04-03 | End: 2024-04-03 | Stop reason: SDUPTHER

## 2024-04-03 RX ORDER — LIDOCAINE HYDROCHLORIDE 20 MG/ML
INJECTION, SOLUTION INTRAVENOUS PRN
Status: DISCONTINUED | OUTPATIENT
Start: 2024-04-03 | End: 2024-04-03 | Stop reason: SDUPTHER

## 2024-04-03 RX ORDER — FENTANYL CITRATE 50 UG/ML
INJECTION, SOLUTION INTRAMUSCULAR; INTRAVENOUS PRN
Status: DISCONTINUED | OUTPATIENT
Start: 2024-04-03 | End: 2024-04-03 | Stop reason: SDUPTHER

## 2024-04-03 RX ORDER — POLYETHYLENE GLYCOL 3350 17 G/17G
17 POWDER, FOR SOLUTION ORAL DAILY PRN
Status: DISCONTINUED | OUTPATIENT
Start: 2024-04-03 | End: 2024-04-05 | Stop reason: HOSPADM

## 2024-04-03 RX ORDER — OXYCODONE HYDROCHLORIDE 5 MG/1
10 TABLET ORAL EVERY 4 HOURS PRN
Status: DISCONTINUED | OUTPATIENT
Start: 2024-04-03 | End: 2024-04-05 | Stop reason: HOSPADM

## 2024-04-03 RX ORDER — WATER 10 ML/10ML
INJECTION INTRAMUSCULAR; INTRAVENOUS; SUBCUTANEOUS
Status: COMPLETED
Start: 2024-04-03 | End: 2024-04-03

## 2024-04-03 RX ORDER — SODIUM CHLORIDE 9 MG/ML
INJECTION, SOLUTION INTRAVENOUS PRN
Status: DISCONTINUED | OUTPATIENT
Start: 2024-04-03 | End: 2024-04-05 | Stop reason: HOSPADM

## 2024-04-03 RX ORDER — SODIUM CHLORIDE 9 MG/ML
INJECTION, SOLUTION INTRAVENOUS PRN
Status: DISCONTINUED | OUTPATIENT
Start: 2024-04-03 | End: 2024-04-03 | Stop reason: SDUPTHER

## 2024-04-03 RX ORDER — HYDRALAZINE HYDROCHLORIDE 20 MG/ML
INJECTION INTRAMUSCULAR; INTRAVENOUS PRN
Status: DISCONTINUED | OUTPATIENT
Start: 2024-04-03 | End: 2024-04-03 | Stop reason: SDUPTHER

## 2024-04-03 RX ORDER — ENOXAPARIN SODIUM 100 MG/ML
40 INJECTION SUBCUTANEOUS DAILY
Status: DISCONTINUED | OUTPATIENT
Start: 2024-04-04 | End: 2024-04-03

## 2024-04-03 RX ORDER — SODIUM CHLORIDE 9 MG/ML
INJECTION, SOLUTION INTRAVENOUS CONTINUOUS
Status: DISCONTINUED | OUTPATIENT
Start: 2024-04-03 | End: 2024-04-04

## 2024-04-03 RX ORDER — SODIUM CHLORIDE 0.9 % (FLUSH) 0.9 %
5-40 SYRINGE (ML) INJECTION EVERY 12 HOURS SCHEDULED
Status: DISCONTINUED | OUTPATIENT
Start: 2024-04-03 | End: 2024-04-05 | Stop reason: HOSPADM

## 2024-04-03 RX ORDER — BUPIVACAINE HYDROCHLORIDE 5 MG/ML
INJECTION, SOLUTION EPIDURAL; INTRACAUDAL PRN
Status: DISCONTINUED | OUTPATIENT
Start: 2024-04-03 | End: 2024-04-03 | Stop reason: ALTCHOICE

## 2024-04-03 RX ORDER — SODIUM CHLORIDE 9 MG/ML
INJECTION, SOLUTION INTRAVENOUS PRN
Status: DISCONTINUED | OUTPATIENT
Start: 2024-04-03 | End: 2024-04-03 | Stop reason: HOSPADM

## 2024-04-03 RX ADMIN — PHENYLEPHRINE HYDROCHLORIDE 150 MCG: 10 INJECTION INTRAVENOUS at 08:49

## 2024-04-03 RX ADMIN — SODIUM CHLORIDE: 9 INJECTION, SOLUTION INTRAVENOUS at 23:54

## 2024-04-03 RX ADMIN — SODIUM CHLORIDE: 9 INJECTION, SOLUTION INTRAVENOUS at 10:27

## 2024-04-03 RX ADMIN — SUGAMMADEX 50 MG: 100 INJECTION, SOLUTION INTRAVENOUS at 08:24

## 2024-04-03 RX ADMIN — SODIUM CHLORIDE: 9 INJECTION, SOLUTION INTRAVENOUS at 07:06

## 2024-04-03 RX ADMIN — ESMOLOL HYDROCHLORIDE 20 MG: 10 INJECTION, SOLUTION INTRAVENOUS at 07:56

## 2024-04-03 RX ADMIN — ESMOLOL HYDROCHLORIDE 30 MG: 10 INJECTION, SOLUTION INTRAVENOUS at 08:01

## 2024-04-03 RX ADMIN — ROCURONIUM BROMIDE 5 MG: 10 INJECTION, SOLUTION INTRAVENOUS at 07:31

## 2024-04-03 RX ADMIN — HYDRALAZINE HYDROCHLORIDE 10 MG: 20 INJECTION INTRAMUSCULAR; INTRAVENOUS at 08:03

## 2024-04-03 RX ADMIN — ESMOLOL HYDROCHLORIDE 10 MG: 10 INJECTION, SOLUTION INTRAVENOUS at 08:31

## 2024-04-03 RX ADMIN — FENTANYL CITRATE 50 MCG: 50 INJECTION INTRAMUSCULAR; INTRAVENOUS at 07:39

## 2024-04-03 RX ADMIN — LIDOCAINE HYDROCHLORIDE 70 MG: 20 INJECTION, SOLUTION INTRAVENOUS at 07:31

## 2024-04-03 RX ADMIN — WATER 2000 MG: 1 INJECTION INTRAMUSCULAR; INTRAVENOUS; SUBCUTANEOUS at 16:18

## 2024-04-03 RX ADMIN — ASPIRIN 81 MG 81 MG: 81 TABLET ORAL at 11:28

## 2024-04-03 RX ADMIN — SUGAMMADEX 50 MG: 100 INJECTION, SOLUTION INTRAVENOUS at 08:37

## 2024-04-03 RX ADMIN — WATER 10 ML: 1 INJECTION INTRAMUSCULAR; INTRAVENOUS; SUBCUTANEOUS at 23:48

## 2024-04-03 RX ADMIN — SUGAMMADEX 50 MG: 100 INJECTION, SOLUTION INTRAVENOUS at 08:25

## 2024-04-03 RX ADMIN — SUGAMMADEX 50 MG: 100 INJECTION, SOLUTION INTRAVENOUS at 08:32

## 2024-04-03 RX ADMIN — OXYCODONE 10 MG: 5 TABLET ORAL at 11:29

## 2024-04-03 RX ADMIN — OXYCODONE 10 MG: 5 TABLET ORAL at 23:43

## 2024-04-03 RX ADMIN — PROPOFOL 80 MG: 10 INJECTION, EMULSION INTRAVENOUS at 07:31

## 2024-04-03 RX ADMIN — ROCURONIUM BROMIDE 20 MG: 10 INJECTION, SOLUTION INTRAVENOUS at 08:00

## 2024-04-03 RX ADMIN — FENTANYL CITRATE 50 MCG: 50 INJECTION INTRAMUSCULAR; INTRAVENOUS at 07:31

## 2024-04-03 RX ADMIN — WATER 2000 MG: 1 INJECTION INTRAMUSCULAR; INTRAVENOUS; SUBCUTANEOUS at 23:46

## 2024-04-03 RX ADMIN — HYDROMORPHONE HYDROCHLORIDE 0.5 MG: 1 INJECTION, SOLUTION INTRAMUSCULAR; INTRAVENOUS; SUBCUTANEOUS at 07:56

## 2024-04-03 RX ADMIN — ONDANSETRON 4 MG: 2 INJECTION INTRAMUSCULAR; INTRAVENOUS at 07:40

## 2024-04-03 RX ADMIN — PROPOFOL 20 MG: 10 INJECTION, EMULSION INTRAVENOUS at 08:40

## 2024-04-03 RX ADMIN — PRAVASTATIN SODIUM 40 MG: 20 TABLET ORAL at 11:28

## 2024-04-03 RX ADMIN — OXYCODONE 10 MG: 5 TABLET ORAL at 17:13

## 2024-04-03 RX ADMIN — ROCURONIUM BROMIDE 25 MG: 10 INJECTION, SOLUTION INTRAVENOUS at 07:37

## 2024-04-03 RX ADMIN — PHENYLEPHRINE HYDROCHLORIDE 50 MCG: 10 INJECTION INTRAVENOUS at 08:42

## 2024-04-03 RX ADMIN — METOPROLOL SUCCINATE 100 MG: 100 TABLET, EXTENDED RELEASE ORAL at 11:36

## 2024-04-03 RX ADMIN — ESMOLOL HYDROCHLORIDE 10 MG: 10 INJECTION, SOLUTION INTRAVENOUS at 08:40

## 2024-04-03 RX ADMIN — Medication 100 MG: at 07:31

## 2024-04-03 RX ADMIN — CEFAZOLIN 2000 MG: 2 INJECTION, POWDER, FOR SOLUTION INTRAMUSCULAR; INTRAVENOUS at 07:35

## 2024-04-03 ASSESSMENT — PAIN - FUNCTIONAL ASSESSMENT
PAIN_FUNCTIONAL_ASSESSMENT: ACTIVITIES ARE NOT PREVENTED
PAIN_FUNCTIONAL_ASSESSMENT: PREVENTS OR INTERFERES SOME ACTIVE ACTIVITIES AND ADLS
PAIN_FUNCTIONAL_ASSESSMENT: PREVENTS OR INTERFERES SOME ACTIVE ACTIVITIES AND ADLS

## 2024-04-03 ASSESSMENT — PAIN SCALES - GENERAL
PAINLEVEL_OUTOF10: 0
PAINLEVEL_OUTOF10: 7
PAINLEVEL_OUTOF10: 0
PAINLEVEL_OUTOF10: 9
PAINLEVEL_OUTOF10: 7
PAINLEVEL_OUTOF10: 0
PAINLEVEL_OUTOF10: 0

## 2024-04-03 ASSESSMENT — PAIN DESCRIPTION - DIRECTION: RADIATING_TOWARDS: LEG

## 2024-04-03 ASSESSMENT — PAIN DESCRIPTION - DESCRIPTORS
DESCRIPTORS: ACHING;DISCOMFORT;SORE
DESCRIPTORS: ACHING;NAGGING
DESCRIPTORS: ACHING

## 2024-04-03 ASSESSMENT — PAIN SCALES - WONG BAKER: WONGBAKER_NUMERICALRESPONSE: NO HURT

## 2024-04-03 ASSESSMENT — PAIN DESCRIPTION - PAIN TYPE: TYPE: SURGICAL PAIN;ACUTE PAIN

## 2024-04-03 ASSESSMENT — PAIN DESCRIPTION - ORIENTATION
ORIENTATION: RIGHT
ORIENTATION: MID
ORIENTATION: RIGHT

## 2024-04-03 ASSESSMENT — PAIN DESCRIPTION - LOCATION
LOCATION: LEG;HIP
LOCATION: BACK
LOCATION: BACK;LEG

## 2024-04-03 ASSESSMENT — ENCOUNTER SYMPTOMS: SHORTNESS OF BREATH: 0

## 2024-04-03 ASSESSMENT — PAIN DESCRIPTION - FREQUENCY: FREQUENCY: CONTINUOUS

## 2024-04-03 ASSESSMENT — PAIN DESCRIPTION - ONSET: ONSET: GRADUAL

## 2024-04-03 NOTE — PROGRESS NOTES
[facility] Daily Progress Note      Admit Date:  4/1/2024    Chief Complaint   Patient presents with    Fall    Leg Pain        Subjective:  Ms. Renner back to room post op. Having some bleeding at surgical site. Primary RN in the room managing dressing. Family at bedside. Charline states she feels well. Denies cardiac complaint.     Objective:   /75   Pulse (!) 121   Temp 97.9 °F (36.6 °C) (Oral)   Resp 14   Ht 1.651 m (5' 5\")   Wt 73 kg (161 lb)   SpO2 94%   BMI 26.79 kg/m²     Intake/Output Summary (Last 24 hours) at 4/3/2024 1554  Last data filed at 4/3/2024 1003  Gross per 24 hour   Intake 800 ml   Output 880 ml   Net -80 ml       TELEMETRY: Sinus     Physical Exam:  General:  Awake, alert, oriented x 3, NAD  Skin:  Warm and dry  Neck:  JVD flat  Chest:  normal air entry  Cardiovascular:  RRR S1S2, no S3, no mrmr  Abdomen:  Soft, ND, NT, No HSM  Extremities:  No edema    Medications:    sodium chloride flush  5-40 mL IntraVENous 2 times per day    ceFAZolin (ANCEF) IVPB  2,000 mg IntraVENous Q8H    [START ON 4/4/2024] enoxaparin  40 mg SubCUTAneous Daily    metoprolol succinate  100 mg Oral Daily    pravastatin  40 mg Oral Daily    ortho mix injection   Injection On Call    aspirin  81 mg Oral Daily      sodium chloride      sodium chloride 75 mL/hr at 04/03/24 1027    [id number]       sodium chloride, oxyCODONE **OR** oxyCODONE, bisacodyl, polyethylene glycol, perflutren lipid microspheres, potassium chloride **OR** potassium alternative oral replacement **OR** potassium chloride, magnesium sulfate, ondansetron **OR** ondansetron, acetaminophen **OR** acetaminophen, morphine, metoprolol    Lab Data:  CBC:   Recent Labs     04/01/24  1849 04/02/24  0502 04/03/24  0522   WBC 14.1* 12.0* 10.9   HGB 15.2 14.6 14.3   HCT 46.1 44.8 44.2   MCV 96.8 97.4 98.9    184 146     BMP:   Recent Labs     04/01/24  1849 04/02/24  0502 04/03/24  0522    141 139   K 3.6 4.1 4.1    106 104

## 2024-04-03 NOTE — PROGRESS NOTES
Dr. Malin at bedside. Aware of heart rate at times in the 120s. Plan to send patient back to inpatient room and nurse to give her the scheduled toprol for this am.

## 2024-04-03 NOTE — PROGRESS NOTES
Sent PerfectServe to Orthopedic on call doctor Dr. Matias Malik notfiying him that the patient's son in the room expressed interest in speaking to the orthopedic dr. The message was read at 1847 and no response was received.

## 2024-04-03 NOTE — PROGRESS NOTES
Pt not on floor at shift change - unable to complete initial assessment of patient before procedure.

## 2024-04-03 NOTE — PROGRESS NOTES
I received a call at 1502 asking that I come take a look at the incision site from my morning OR patient.  I was informed that the pt was getting up with Physical Therapy and the incision site began to bleed.  Once I arrived in the patients room, Fatoumata VALVERDE stopped holding pressure to site and showed me the incision site which had saturated the dressing. Also it was noticed that the patient began developing a hematoma.  I asked if Dr. Negro had been contacted, Fatoumata tried via perfect serve to call his office but not able to get in touch with him by the time I arrived. I then held pressure to the site for an additional 5 minutes while Fatoumata tried again to contact Dr. Negro.  At this point I called the OR to also try to get a contact number for Dr. Negro. I received Dr. Negro's personal cell phone number and Fatoumata VALVERDE and myself called Dr. Negro to advise of patients condition.  Dr. Negro advised that he wanted a pressure dressing applied to incision site along with a sand bag applied to hematoma site for 1 hour. I left to get supplies and sand bag.  I returned a couple of minutes later.  Optifoam  and Xeroform dressing that was applied in the OR was removed and 4 x 4 gauze was folded to cover staples and toilet paper tape applied to site for pressure dressing.  Once pressure dressing applied sand bag was placed over hematoma/dressing site and secured in place with toilet paper tape. I asked that sand bag please be returned to OR once treatment was complete.     SANJEEV Lopez 4/3/2024 @8753

## 2024-04-03 NOTE — PROGRESS NOTES
PACU Transfer to Floor Note    Procedure(s):  RIGHT HIP INTRAMEDULLARY NAILING    Current Allergies: Pcn [penicillins]    Pt meets criteria as per Jannet Score and ASPAN Standards to transfer to next phase of care.     No results for input(s): \"POCGLU\" in the last 72 hours.    Vitals:    04/03/24 1000   BP: (!) 120/45   Pulse: (!) 125   Resp: 12   Temp:    SpO2: 94%     Vitals within 20% of pt's admission vitals as per JANNET SCORE    SpO2: 94 %    O2 Flow Rate (L/min): 3 L/min      Intake/Output Summary (Last 24 hours) at 4/3/2024 1015  Last data filed at 4/3/2024 1003  Gross per 24 hour   Intake 800 ml   Output 1305 ml   Net -505 ml       Pain assessment:  none    Pain Level: 0      Is patient incontinent: no    Handoff report given at bedside to ABRAM Biswas from PCU.  lynette Echavarria updated and directed to pt room      4/3/2024 10:15 AM

## 2024-04-03 NOTE — OP NOTE
DATE OF SURGERY:  4/3/24    PREOPERATIVE DIAGNOSIS: Right intertrochanteric hip fracture.    POSTOPERATIVE DIAGNOSIS: Right intertrochanteric hip fracture.    PROCEDURE: Right hip intramedullary nailing.    ASSISTANT: Sameer Grewal    ANESTHESIA: General.    ESTIMATED BLOOD LOSS: <50 mL.    COMPLICATIONS: None.    DISPOSITION: To PACU in good condition.    INDICATIONS FOR PROCEDURE: The patient is a 83 y.o. female  who sustained a fall onto the right hip. The patient was brought to the [facility] ER and was discovered to have a right intertrochanteric hip fracture.  The patient was subsequently admitted to the hospital for definitive treatment.  An orthopedic consultation was obtained. I recommended operative fixation of the hip. The patient and /or family was explained the risks, benefits,  complications, alternatives of the procedure and they subsequently provided  written informed consent for the procedure.    DESCRIPTION OF PROCEDURE: The patient was seen in the preoperative holding  area. The right hip was marked. The patient was seen by the Anesthesia service. The patient was then brought to the operating room.  The patient was induced under general anesthesia on the bed.  The patient was given  prophylactic preoperative IV antibiotics. The patient was then transferred onto the fracture table in the supine position. Care was taken to ensure that all  bony prominences were well padded. The nonoperative leg was placed in the well leg  adair. The operative leg was placed in a traction boot.  We then performed a closed  reduction on the fracture table with fluoroscopy. Once we had anatomic  reduction, we then sterilely prepped and draped the operative hip in the usual  fashion. A time-out was taken where the patient, the operative extremity and  the operative procedure were once again verified. We then made an  approximately 3 cm incision just superior to the tip of the greater  trochanter along the

## 2024-04-03 NOTE — ANESTHESIA POSTPROCEDURE EVALUATION
Department of Anesthesiology  Postprocedure Note    Patient: Charline Renner  MRN: [id number]  YOB: 1940  Date of evaluation: 4/3/2024    Procedure Summary       Date: 04/03/24 Room / Location: [address]    Anesthesia Start: 0724 Anesthesia Stop: 0909    Procedure: RIGHT HIP INTRAMEDULLARY NAILING (Right) Diagnosis:       Closed displaced intertrochanteric fracture of right femur, initial encounter ([facility])      (Closed displaced intertrochanteric fracture of right femur, initial encounter ([facility]) [S72.141A])    Surgeons: Manjinder Negro MD Responsible Provider: Guillermina Malin MD    Anesthesia Type: general ASA Status: 3            Anesthesia Type: No value filed.    Janice Phase I: Janice Score: 7    Janice Phase II:      Anesthesia Post Evaluation    Patient location during evaluation: PACU  Patient participation: complete - patient participated  Level of consciousness: awake  Airway patency: patent  Nausea & Vomiting: no nausea and no vomiting  Cardiovascular status: hemodynamically stable  Respiratory status: acceptable  Hydration status: stable  Pain management: adequate    No notable events documented.

## 2024-04-03 NOTE — PROGRESS NOTES
Hospitalist:  Called by RN as pt was having worsening bruise over R thigh near surgical site, concerning for Hematoma. S/E at bedside. Pt reports mild pain at surgical site. VSS. On exam, noted ecchymosis and swelling around surgical site. Pressure dressing in place near IM nail site. D/w RN who spoke to Ortho Surgeon  about the concern. Will continue to monitor H&H, vitals. Hold lovenox. D/w pt and son at bedside.

## 2024-04-03 NOTE — PROGRESS NOTES
[facility] - Rehabilitation Department      Physical Therapy  3223/3223-01  [facility] 3 PROGRESSIVE CARE    [x] Initial Evaluation            [x] Daily Treatment Note         [] Discharge Summary      Patient: Charline Renner   : 1940   MRN: [id number]   Date of Service:  4/3/2024   Admitting Diagnosis: Intertrochanteric fracture of right femur, closed, initial encounter ([facility])  Ordering Physician: Manjinder Negro MD   Current Admission Summary:   4/3/24 progress note Jose Queen MD,  \"Charline Renner is a 83 y.o. female with a pmh of HTN, A.fib not on AC due to hx of bleeding who presents after a mechanical fall and noted to have R intertrochanteric femur fracture.  S/p fall, mechanical: Patient denies any loss of consciousness  R intertrochanteric femur fracture: s/p R hip IM nail placement  Paroxysmal A-fib: A-fib with RVR in ER currently converted to sinus rhythm.  Patient is not on chronic anticoagulation due to history of GI bleeding.  Patient reports underwent colonoscopy and capsule endoscopy without any source of bleeding.    Hypertension  Hyperlipidemia  Generalized physical deconditioning\"  4/3/2024 Manjinder Negro MD  brief op note Procedure(s) : Right HipIntramedullary nailing. Anesthesia: General , PLANS: The patient will be recovered in the PACU and then be readmitted to  the floor.   4/3/2024 Manjinder Negro MD op note,  \"The patient is Weight bearing as tolerated on the operative leg. The patient will have PT and OT consult. The patient will have 24 hours of prophylactic IV antibiotics. The patient will have DVT prophylaxis.\"  Past Medical History:  has a past medical history of A-fib ([facility]), Hyperlipidemia, Hypertension, and Macular degeneration.  Past Surgical History:  has no past surgical history on file.  ______________________________________________________________________________________________________________________________________________  Discharge Recommendations: ARU   AMP[facility] Raw

## 2024-04-03 NOTE — PROGRESS NOTES
Patient is resting in bed talking with family at the bedside. IV infusing. Patient and family instructed to use call light for any needs that may arise between now and surgery time, verbalized understanding. Denies needs at present with call light in reach.

## 2024-04-03 NOTE — PROGRESS NOTES
[facility]    Hospitalist Progress Note:      Name:  Charline Renner /Age/Sex: 1940  (83 y.o. female)   MRN & CSN:  [id number] & [id number] Encounter Date: 4/3/2024    Location:  3223/3223-01 PCP: Denny Jensen     Attending:Jose Queen MD  Admission Date: 2024      Hospital Day: 3    Assessment:  Charline Renner is a 83 y.o. female with a pmh of HTN, A.fib not on AC due to hx of bleeding who presents after a mechanical fall and noted to have R intertrochanteric femur fracture.  S/p fall, mechanical: Patient denies any loss of consciousness  R intertrochanteric femur fracture: s/p R hip IM nail placement  Paroxysmal A-fib: A-fib with RVR in ER currently converted to sinus rhythm.  Patient is not on chronic anticoagulation due to history of GI bleeding.  Patient reports underwent colonoscopy and capsule endoscopy without any source of bleeding.    Hypertension  Hyperlipidemia  Generalized physical deconditioning    Plan:   Continue post op care and monitoring, perioperative antibiotics, pain mx, wound care and per Ortho Surgery team.  Tele monitoring, resume metoprolol. Cardizem gtt prn for VR control  Cont ASA, statin. F/u with Cardiology  Gentle hydration, pain control  Current meds reviewed, adjusted.   See orders  Diet ADULT DIET; Regular  ADULT ORAL NUTRITION SUPPLEMENT; AM Snack, PM Snack; Standard High Calorie/High Protein Oral Supplement   DVT Prophylaxis [x] Lovenox, []  Heparin, [] SCDs, [] Ambulation,  [] Eliquis, [] Xarelto  [] Coumadin   Code Status Full Code   Disposition Expected Disposition: Home vs ECF vs Hm with [facility]  Estimated Date of Discharge:  1-2 days  Reason for continued admission: Right hip fracture     Subjective:  Pt. seen and examined at bedside.  Overall symptoms are resolving.  States mild pain in his right hip and right thigh area.  Denies any tingling or numbness in the right lower extremity.  Son at bedside.  Denies any chest pain/Cough/SOB/Abd pain/

## 2024-04-03 NOTE — H&P
Preoperative H&P Update    The patient's History and Physical in the medical record from 4/1/24 was reviewed by me today.  I reviewed the HPI, medications, allergies, reason for surgery, diagnosis and treatment plan and there has been no interval change.    The patient was examined by me today. Physical exam findings for this update include:    BP (!) 153/84   Pulse (!) 102   Temp 98.8 °F (37.1 °C) (Infrared)   Resp 16   Ht 1.651 m (5' 5\")   Wt 73 kg (161 lb)   SpO2 97%   BMI 26.79 kg/m²   Airway is intact  Chest: breathing comfortably  Heart: regular rate  Findings on exam of the body region where surgery is to be performed include: see last office and/or consult note        Electronically signed by Manjinder Negro MD on 4/3/2024 at 6:53 AM        
  Recent Labs     04/01/24  1849   WBC 14.1*   HGB 15.2        BMP:    Recent Labs     04/01/24  1849      K 3.6      CO2 29   BUN 16   CREATININE 0.7   GLUCOSE 136*     Hepatic: No results for input(s): \"AST\", \"ALT\", \"ALB\", \"BILITOT\", \"ALKPHOS\" in the last 72 hours.  Lipids: No results found for: \"CHOL\", \"HDL\", \"TRIG\"  Hemoglobin A1C: No results found for: \"LABA1C\"  TSH: No results found for: \"TSH\"  Troponin: No results found for: \"TROPONINT\"  Lactic Acid: No results for input(s): \"LACTA\" in the last 72 hours.  BNP: No results for input(s): \"PROBNP\" in the last 72 hours.  UA:No results found for: \"NITRU\", \"COLORU\", \"PHUR\", \"LABCAST\", \"WBCUA\", \"RBCUA\", \"MUCUS\", \"TRICHOMONAS\", \"YEAST\", \"BACTERIA\", \"CLARITYU\", \"SPECGRAV\", \"LEUKOCYTESUR\", \"UROBILINOGEN\", \"BILIRUBINUR\", \"BLOODU\", \"GLUCOSEU\", \"KETUA\", \"AMORPHOUS\"  Urine Cultures: No results found for: \"LABURIN\"  Blood Cultures: No results found for: \"BC\"  No results found for: \"BLOODCULT2\"  Organism: No results found for: \"ORG\"    Imaging/Diagnostics Last 24 Hours   XR FEMUR RIGHT (MIN 2 VIEWS)    Result Date: 4/1/2024  Exam: XR FEMUR RIGHT (MIN 2 VIEWS) History: 83 years Female; \"fall hip and thigh pain\". Comparison: None available Findings: Comminuted displaced intertrochanteric right femur fracture. No dislocation. No osseous erosion. No significant soft tissue abnormality. Proximal tibial fixation hardware.     Impression: Displaced comminuted intertrochanteric right femur fracture. Electronically signed by Tirso Luo MD      Due to the immediate potential for life-threatening deterioration due to A-fib with RVR, I spent 30 minutes providing critical care.  This time is excluding time spent performing procedures.    Electronically signed by Angela Sheets MD on 4/2/2024 at 2:49 AM

## 2024-04-03 NOTE — PROGRESS NOTES
Staff assist on unit was pressed by PT Mallika Cervantes. Mallika was in the room with patient performing post operative physical activity ordered by Dr. Peterson. During activity at the bedside the patient's proximal upper trocanter surgical site had begun to bleed, saturating the dressing and dripping onto the floor. Mallika was holding pressure on the wound. I asked my fellow nurses that came in after me to grab gauze while I took over applying pressure. Mallika immediately took the patient's blood pressure and assisted me in maintaining the patient at bedside. I asked Georgia RN to call the OR and ask for the physician to come up to review the surgical site. After holding pressure for approximately 5 minutes we determined the patient was safe to assist to a supine position in bed. The patient was moved without releasing pressure on the site. The OR nurse Lillie responded in and assisted in maintaining pressure on the site. I informed Lillie I was trying to reach the physician to come up to see the patient. Lillie held pressure on the site while I attempted to call Dr. Negro, Dr. Mcclure office, and the PA Jesus Gonsales. I was unable to reach anyone via [employer]. Lillie was able to provide a number given to her for Dr. Negro and he was called on speakerphone while we were in the room together. Dr. Negro instructed me to change the patient's saturated post op dressing and to dress it with a pressure bandage, place a sand bag on the site for one hour, and to apply ice to the site for one hour. I reiterated to Dr. Negro there was a sizeable hematoma forming and Dr. Negro reiterated his instructions transcribed above. I resumed holding pressure on the hematoma while Lillie gathered a pressure dressing, gauze, and the sandbag from the OR. Lillie returned with the supplies, assisted in dressing the surgical site with gauze and toilet paper tape, and securing the sandbag to appropriately apply pressure. Patient's blood pressure was checked

## 2024-04-03 NOTE — ANESTHESIA PRE PROCEDURE
Atrial fibrillation with RVR (HCC) I48.91       Past Medical History:        Diagnosis Date    A-fib (HCC)     Hyperlipidemia     Hypertension     Macular degeneration        Past Surgical History:  History reviewed. No pertinent surgical history.    Social History:    Social History     Tobacco Use    Smoking status: Never    Smokeless tobacco: Never   Substance Use Topics    Alcohol use: Not on file                                Counseling given: No      Vital Signs (Current):   Vitals:    04/02/24 1657 04/02/24 1727 04/02/24 2329 04/03/24 0423   BP:  (!) 122/59 135/66 103/70   Pulse:  64 79 89   Resp: 17 17 18 16   Temp:  98 °F (36.7 °C) 98.2 °F (36.8 °C) 99 °F (37.2 °C)   TempSrc:  Temporal Oral Oral   SpO2:  97% 99% 97%   Weight:       Height:                                                  BP Readings from Last 3 Encounters:   04/03/24 103/70       NPO Status: Time of last liquid consumption: 2300                        Time of last solid consumption: 2200                        Date of last liquid consumption: 04/02/24                        Date of last solid food consumption: 04/02/24    BMI:   Wt Readings from Last 3 Encounters:   04/01/24 73 kg (161 lb)     Body mass index is 26.79 kg/m².    CBC:   Lab Results   Component Value Date/Time    WBC 10.9 04/03/2024 05:22 AM    RBC 4.47 04/03/2024 05:22 AM    HGB 14.3 04/03/2024 05:22 AM    HCT 44.2 04/03/2024 05:22 AM    MCV 98.9 04/03/2024 05:22 AM    RDW 12.9 04/03/2024 05:22 AM     04/03/2024 05:22 AM       CMP:   Lab Results   Component Value Date/Time     04/03/2024 05:22 AM    K 4.1 04/03/2024 05:22 AM     04/03/2024 05:22 AM    CO2 27 04/03/2024 05:22 AM    BUN 16 04/03/2024 05:22 AM    CREATININE 0.7 04/03/2024 05:22 AM    LABGLOM 84 04/03/2024 05:22 AM    GLUCOSE 120 04/03/2024 05:22 AM    CALCIUM 8.5 04/03/2024 05:22 AM       POC Tests: No results for input(s): \"POCGLU\", \"POCNA\", \"POCK\", \"POCCL\", \"POCBUN\", \"POCHEMO\", \"POCHCT\" in

## 2024-04-03 NOTE — CARE COORDINATION
Discharge planning:    Met with patient and son bedside to discuss discharge planning and therapy recommendations. Provided son with SNF list and ARU list and discussed the differences between to them. Son Jericho states that he wants to discuss options with his sister. Son to reach out to  later today.  provided son with CM's telephone number to call when they are ready to have the discussion.     Case management will continue to follow.     MICHAEL Zelaya, RN  RN Case Manager

## 2024-04-03 NOTE — BRIEF OP NOTE
Brief Postoperative Note      Patient: Charline Renner  YOB: 1940  MRN: [id number]    Date of Procedure: 4/3/2024    Pre-Op Diagnosis Codes:     * Closed displaced intertrochanteric fracture of right femur, initial encounter ([facility]) [S72.141A]    Post-Op Diagnosis: Same       Procedure(s):  RIGHT HIP INTRAMEDULLARY NAILING    Surgeon(s):  Manjinder Negro MD    Assistant:  Surgical Assistant: Gonzales Grewal    Anesthesia: General    Estimated Blood Loss (mL): less than 50     Complications: None    Specimens:   * No specimens in log *    Implants:  Implant Name Type Inv. Item Serial No.  Lot No. LRB No. Used Action   Gamma 4 Long Nail Right    [employer] [id number] Right 1 Implanted   SCREW LAG GAMMA 10.5X95MM - [id number]  SCREW LAG GAMMA 10.5X95MM  [employer] [id number] Right 1 Implanted   Locking Screw IMN Screws 5 x 40mm    [employer] [id number] Right 1 Implanted         Drains:   Urinary Catheter 04/01/24 [place] (Active)   Catheter Indications Perioperative use for selected surgical procedures 04/03/24 0327   Site Assessment Pink 04/03/24 0327   Urine Color Yellow 04/03/24 0327   Urine Appearance Clear 04/03/24 0327   Urine Odor Malodorous 04/03/24 0327   Collection Container Standard 04/03/24 0327   Securement Method Leg strap 04/03/24 0327   Catheter Care  Perineal wipes 04/02/24 1923   Catheter Best Practices  Drainage tube clipped to bed;Catheter secured to thigh;Tamper seal intact;Bag below bladder;Bag not on floor;Lack of dependent loop in tubing;Drainage bag less than half full 04/03/24 0327   Status Draining 04/03/24 0327   Output (mL) 100 mL 04/02/24 1822       Findings:  Infection Present At Time Of Surgery (PATOS) (choose all levels that have infection present):  No infection present          Electronically signed by Manjinder Negro MD on 4/3/2024 at 8:31 AM

## 2024-04-03 NOTE — PROGRESS NOTES
Pt received from OR to PACU # 1.     Post: Procedure(s):  RIGHT HIP INTRAMEDULLARY NAILING     Report received from CRNA and OR RN.    Pt is arousable to name . Pt is not fully wakeful from anesthesia. Right hip with optifoam x3 clean,dry and intact. Distal pulses +1.       Attached to PACU monitoring system. Alarms and parameters set    Denies pain and no complaints of nausea at this time.

## 2024-04-03 NOTE — CARE COORDINATION
Discharge planner:    Per sons request,  met with son and his sister to discuss a discharge plan for via telephone to explain ARU and SNF to sister. After discussion, son states he will speak with patient about options and get back with  at end of day or tomorrow morning.    MARCELO ZelayaN, RN  RN Case Manager

## 2024-04-04 LAB
ANION GAP SERPL CALCULATED.3IONS-SCNC: 9 MMOL/L (ref 3–16)
BASOPHILS # BLD: 0.02 K/UL (ref 0–0.2)
BASOPHILS NFR BLD: 0 %
BUN SERPL-MCNC: 21 MG/DL (ref 7–20)
CALCIUM SERPL-MCNC: 7.8 MG/DL (ref 8.3–10.6)
CHLORIDE SERPL-SCNC: 103 MMOL/L (ref 99–110)
CO2 SERPL-SCNC: 24 MMOL/L (ref 21–32)
CREAT SERPL-MCNC: 0.7 MG/DL (ref 0.6–1.2)
EKG ATRIAL RATE: 74 BPM
EKG DIAGNOSIS: NORMAL
EKG P-R INTERVAL: 134 MS
EKG Q-T INTERVAL: 372 MS
EKG QRS DURATION: 80 MS
EKG QTC CALCULATION (BAZETT): 412 MS
EKG R AXIS: 165 DEGREES
EKG T AXIS: 168 DEGREES
EKG VENTRICULAR RATE: 74 BPM
EOSINOPHIL # BLD: 0.14 K/UL (ref 0–0.6)
EOSINOPHILS RELATIVE PERCENT: 1 %
ERYTHROCYTE [DISTWIDTH] IN BLOOD BY AUTOMATED COUNT: 13 % (ref 12.4–15.4)
GFR SERPL CREATININE-BSD FRML MDRD: 81 ML/MIN/1.73M2
GLUCOSE SERPL-MCNC: 119 MG/DL (ref 70–99)
HCT VFR BLD AUTO: 34 % (ref 36–48)
HCT VFR BLD AUTO: 35.7 % (ref 36–48)
HCT VFR BLD AUTO: 37.7 % (ref 36–48)
HGB BLD-MCNC: 11 G/DL (ref 12–16)
HGB BLD-MCNC: 11.4 G/DL (ref 12–16)
HGB BLD-MCNC: 12.2 G/DL (ref 12–16)
IMM GRANULOCYTES # BLD AUTO: 0.02 K/UL (ref 0–0.5)
IMM GRANULOCYTES NFR BLD: 0 %
LYMPHOCYTES NFR BLD: 1.22 K/UL (ref 1–5.1)
LYMPHOCYTES RELATIVE PERCENT: 12 %
MCH RBC QN AUTO: 32 PG (ref 26–34)
MCHC RBC AUTO-ENTMCNC: 31.9 G/DL (ref 31–36)
MCV RBC AUTO: 100.3 FL (ref 80–100)
MONOCYTES NFR BLD: 0.99 K/UL (ref 0–1.3)
MONOCYTES NFR BLD: 9 %
NEUTROPHILS NFR BLD: 77 %
NEUTS SEG NFR BLD: 8.12 K/UL (ref 1.7–7.7)
PLATELET # BLD AUTO: 143 K/UL (ref 135–450)
PMV BLD AUTO: 11.2 FL (ref 9.4–12.4)
POTASSIUM SERPL-SCNC: 4.4 MMOL/L (ref 3.5–5.1)
RBC # BLD AUTO: 3.56 M/UL (ref 4–5.2)
SODIUM SERPL-SCNC: 136 MMOL/L (ref 136–145)
WBC OTHER # BLD: 10.5 K/UL (ref 4–11)

## 2024-04-04 PROCEDURE — 2700000000 HC OXYGEN THERAPY PER DAY

## 2024-04-04 PROCEDURE — 97535 SELF CARE MNGMENT TRAINING: CPT

## 2024-04-04 PROCEDURE — 85018 HEMOGLOBIN: CPT

## 2024-04-04 PROCEDURE — 97166 OT EVAL MOD COMPLEX 45 MIN: CPT

## 2024-04-04 PROCEDURE — 99024 POSTOP FOLLOW-UP VISIT: CPT | Performed by: PHYSICIAN ASSISTANT

## 2024-04-04 PROCEDURE — 80048 BASIC METABOLIC PNL TOTAL CA: CPT

## 2024-04-04 PROCEDURE — 36415 COLL VENOUS BLD VENIPUNCTURE: CPT

## 2024-04-04 PROCEDURE — 97530 THERAPEUTIC ACTIVITIES: CPT

## 2024-04-04 PROCEDURE — 6370000000 HC RX 637 (ALT 250 FOR IP): Performed by: ORTHOPAEDIC SURGERY

## 2024-04-04 PROCEDURE — 85014 HEMATOCRIT: CPT

## 2024-04-04 PROCEDURE — 2580000003 HC RX 258: Performed by: ORTHOPAEDIC SURGERY

## 2024-04-04 PROCEDURE — 51798 US URINE CAPACITY MEASURE: CPT

## 2024-04-04 PROCEDURE — 94761 N-INVAS EAR/PLS OXIMETRY MLT: CPT

## 2024-04-04 PROCEDURE — 1200000000 HC SEMI PRIVATE

## 2024-04-04 PROCEDURE — 97110 THERAPEUTIC EXERCISES: CPT

## 2024-04-04 PROCEDURE — 85025 COMPLETE CBC W/AUTO DIFF WBC: CPT

## 2024-04-04 RX ADMIN — METOPROLOL SUCCINATE 100 MG: 100 TABLET, EXTENDED RELEASE ORAL at 09:12

## 2024-04-04 RX ADMIN — Medication 10 ML: at 09:01

## 2024-04-04 RX ADMIN — ASPIRIN 81 MG 81 MG: 81 TABLET ORAL at 09:10

## 2024-04-04 RX ADMIN — PRAVASTATIN SODIUM 40 MG: 20 TABLET ORAL at 09:10

## 2024-04-04 ASSESSMENT — PAIN SCALES - GENERAL
PAINLEVEL_OUTOF10: 0

## 2024-04-04 NOTE — PROGRESS NOTES
Occupational Therapy    [facility]       Occupational Therapy  3223/3223-01  [facility] 3 PROGRESSIVE CARE    [x] Initial Evaluation            [] Daily Treatment Note         [] Discharge Summary      Patient: Charline Renner   : 1940   MRN: [id number]   Date of Service:  2024    Admitting Diagnosis:  Intertrochanteric fracture of right femur, closed, initial encounter ([facility])  Referring Physician: Dr. Queen  Current Admission Summary: Pt is an 84 y/o female who fell and sustained a right femur fracture. Pt is s/p IM nailing on 4/3. Pt is alert and awake during session and her son observed assessment. Pt states she sustained a fall 14 years ago and went to acute rehab for continued therapy prior to returning home.   Past Medical History:  has a past medical history of A-fib ([facility]), Hyperlipidemia, Hypertension, and Macular degeneration.  Past Surgical History:  has a past surgical history that includes hip surgery (Right, 4/3/2024).      Discharge Recommendations: ARU     AM-PAC Inpatient Daily Activity Raw Score: 16    Therapy discharge recommendations take into account each patient's current medical complexities and are subject to input/oversight from the patient's healthcare team.     Barriers to Home Discharge:   [x] Steps to access home entry or bed/bath:   [] Unable to transfer, ambulate, or propel wheelchair household distances without assist   [x] Limited available assist at home upon discharge    [x] Patient or family requests d/c to post-acute facility    [] Poor cognition/safety awareness for d/c to home alone    []Unable to maintain ordered weight bearing status    [] Patient with salient signs of long-standing immobility   [] Patient is at risk for falls due to:   [] Other:    If pt is unable to be seen after this session, please let this note serve as discharge summary.  Please see case management note for discharge disposition.  Thank you.    DME Required For

## 2024-04-04 NOTE — CARE COORDINATION
Met with pt and son at bedside. Family chose [facility] 1st choice and [facility] 2nd choice.

## 2024-04-04 NOTE — PROGRESS NOTES
Department of Orthopedic Surgery  Physician Assistant   Progress Note    Subjective:       Systemic or Specific Complaints:Pain Control and there was concern overnight for hematoma forming at proximal incision.  Was called today about weak pulses bilaterally with cold feet.  Talking to the patient now she feels okay.  Has some pain in the thigh.  Did not notice any bleeding overnight once dressing was placed.  No pain or coldness in her feet currently.      Objective:     Patient Vitals for the past 24 hrs:   BP Temp Temp src Pulse Resp SpO2   04/04/24 1204 (!) 121/54 98.2 °F (36.8 °C) Oral 96 18 98 %   04/04/24 0911 126/63 -- -- 98 -- --   04/04/24 0835 -- -- -- 100 16 98 %   04/04/24 0806 111/67 97.8 °F (36.6 °C) Oral (!) 104 17 95 %   04/04/24 0243 (!) 118/58 97.7 °F (36.5 °C) Oral (!) 103 17 97 %   04/03/24 2343 -- -- -- 99 18 --   04/03/24 2322 110/60 98.4 °F (36.9 °C) Oral -- 18 98 %   04/03/24 1916 (!) 114/57 97.9 °F (36.6 °C) Axillary (!) 110 17 100 %   04/03/24 1743 -- -- -- -- 16 --   04/03/24 1704 123/61 -- -- 93 16 --   04/03/24 1643 (!) 107/52 -- -- 97 16 100 %   04/03/24 1614 113/72 -- -- (!) 103 16 100 %   04/03/24 1535 (!) 110/50 -- -- -- -- --   04/03/24 1500 118/86 -- -- -- -- --   04/03/24 1415 (!) 121/48 -- -- -- -- --       General: alert, appears stated age, and cooperative   Wound: Wound clean and dry no evidence of infection., No Erythema, No Drainage, Wound Intact, and Positive for Edema   Motion: Painful range of Motion in affected extremity   DVT Exam: No evidence of DVT seen on physical exam.     Additional exam: proxima incision dressing removed.  Minimal old blood noted on dressing. There is no significant hematoma present and no fluctuance around the incisions.  Mild ecchymosis noted.  No active drainage/bleeding noted today.  Thigh is soft and mildly tender.  Both feet have dopplerable pulses and equal - although both are not overwhelmingly strong but present.  No temperature change

## 2024-04-04 NOTE — PROGRESS NOTES
[facility]    Hospitalist Progress Note:      Name:  Charline Renner /Age/Sex: 1940  (83 y.o. female)   MRN & CSN:  [id number] & [id number] Encounter Date: 2024    Location:  3223/3223-01 PCP: Denny Jensen     Attending:Jose Queen MD  Admission Date: 2024      Hospital Day: 4    Assessment:  Charline Renner is a 83 y.o. female with a pmh of HTN, A.fib not on AC due to hx of bleeding who presents after a mechanical fall and noted to have R intertrochanteric femur fracture.  S/p fall, mechanical: no loss of consciousness  R intertrochanteric femur fracture: s/p R hip IM nail placement 4/3/24.   Acute blood loss anemia: concern for surgical site hematoma at R thigh   Paroxysmal A-fib: A-fib with RVR in ER currently converted to sinus rhythm.  Patient reports not on chronic anticoagulation due to GI bleeding in past.    Hypertension  Hyperlipidemia  Generalized physical deconditioning    Plan:   Cont hold lovenox  Monitor H&H, transfuse prn for HB < 7  D/w Ortho team PA, covering for  - felt not unexpected findings, will see pt today.  Continue post op care and monitoring, perioperative antibiotics, wound care and  dVT prophylaxis per Ortho Surgery team.  Tele monitoring, cont metoprolol.   Cont ASA, statin. F/u with Cardiology  DC IVFs, encourage po intake  Current meds reviewed, adjusted.   See orders  Diet ADULT DIET; Regular  ADULT ORAL NUTRITION SUPPLEMENT; AM Snack, PM Snack; Standard High Calorie/High Protein Oral Supplement   DVT Prophylaxis [] Lovenox, []  Heparin, [x] SCDs, [] Ambulation,  [] Eliquis, [] Xarelto  [] Coumadin   Code Status Full Code   Disposition Expected Disposition: Home vs ECF vs Hm with [facility]  Estimated Date of Discharge:  1-2 days  Reason for continued admission: Right hip fracture     Subjective:  Pt. seen and examined at bedside.  Overall symptoms are stable.  States no pain in his right hip and right thigh area currently.  Denies any tingling or  acute fracture or traumatic subluxation. No prevertebral soft tissue swelling. Multilevel degenerative changes of the spine in the form of disc space height loss, marginal osteophytosis, facet and uncovertebral hypertrophy. Mild to moderate spinal canal stenosis at C6-7 due to prominent disc osteophyte complex. Moderate to severe multilevel neural foraminal narrowing, most advanced at C4-5 and C6-7. No paraspinal soft tissue abnormality.     CT brain: No acute intracranial hemorrhage or mass effect. No calvarial fracture. CT cervical spine: No acute vertebral fracture or traumatic subluxation.  Electronically signed by Tirso Luo MD    XR CHEST PORTABLE    Result Date: 4/1/2024  EXAM: XR CHEST PORTABLE INDICATION: 83 years Female; \"hip fx\" COMPARISON: None FINDINGS: Enlarged cardiac silhouette. No consolidation, pleural effusion, or pneumothorax. No acute osseous abnormality.     Mildly enlarged cardiac silhouette. Electronically signed by Tirso Luo MD    XR FEMUR RIGHT (MIN 2 VIEWS)    Result Date: 4/1/2024  Exam: XR FEMUR RIGHT (MIN 2 VIEWS) History: 83 years Female; \"fall hip and thigh pain\". Comparison: None available Findings: Comminuted displaced intertrochanteric right femur fracture. No dislocation. No osseous erosion. No significant soft tissue abnormality. Proximal tibial fixation hardware.     Impression: Displaced comminuted intertrochanteric right femur fracture. Electronically signed by Tirso Luo MD      Cultures:  Organism: No results found for: \"ORG\"      Electronically signed by: Electronically signed by Jose Queen MD on 4/4/2024 at 7:31 AM, 4/4/2024 7:31 AM     Quality 130: Documentation Of Current Medications In The Medical Record: Current Medications Documented Quality 402: Tobacco Use And Help With Quitting Among Adolescents: Patient screened for tobacco and is an ex-smoker Detail Level: Detailed Quality 431: Preventive Care And Screening: Unhealthy Alcohol Use - Screening: Patient screened for unhealthy alcohol use using a single question and scores less than 2 times per year Quality 226: Preventive Care And Screening: Tobacco Use: Screening And Cessation Intervention: Patient screened for tobacco use and is an ex/non-smoker

## 2024-04-04 NOTE — PLAN OF CARE
Problem: Discharge Planning  Goal: Discharge to home or other facility with appropriate resources  Outcome: Progressing  Flowsheets (Taken 4/4/2024 1940)  Discharge to home or other facility with appropriate resources:   Identify barriers to discharge with patient and caregiver   Identify discharge learning needs (meds, wound care, etc)   Refer to discharge planning if patient needs post-hospital services based on physician order or complex needs related to functional status, cognitive ability or social support system   Arrange for needed discharge resources and transportation as appropriate   Arrange for interpreters to assist at discharge as needed     Problem: Pain  Goal: Verbalizes/displays adequate comfort level or baseline comfort level  Outcome: Progressing  Flowsheets (Taken 4/4/2024 1940)  Verbalizes/displays adequate comfort level or baseline comfort level:   Encourage patient to monitor pain and request assistance   Administer analgesics based on type and severity of pain and evaluate response   Consider cultural and social influences on pain and pain management   Assess pain using appropriate pain scale   Implement non-pharmacological measures as appropriate and evaluate response   Notify Licensed Independent Practitioner if interventions unsuccessful or patient reports new pain     Problem: Skin/Tissue Integrity  Goal: Absence of new skin breakdown  Description: 1.  Monitor for areas of redness and/or skin breakdown  2.  Assess vascular access sites hourly  3.  Every 4-6 hours minimum:  Change oxygen saturation probe site  4.  Every 4-6 hours:  If on nasal continuous positive airway pressure, respiratory therapy assess nares and determine need for appliance change or resting period.  Outcome: Progressing     Problem: Safety - Adult  Goal: Free from fall injury  Outcome: Progressing  Flowsheets (Taken 4/4/2024 1940)  Free From Fall Injury:   Instruct family/caregiver on patient safety   Based on  caregiver fall risk screen, instruct family/caregiver to ask for assistance with transferring infant if caregiver noted to have fall risk factors     Problem: ABCDS Injury Assessment  Goal: Absence of physical injury  Outcome: Progressing  Flowsheets (Taken 4/4/2024 1940)  Absence of Physical Injury: Implement safety measures based on patient assessment

## 2024-04-04 NOTE — PLAN OF CARE
Problem: Discharge Planning  Goal: Discharge to home or other facility with appropriate resources  4/4/2024 0032 by Jolie Fitzgerald, RN  Outcome: Progressing     Problem: Pain  Goal: Verbalizes/displays adequate comfort level or baseline comfort level  4/4/2024 0032 by Jolie Fitzgerald, RN  Outcome: Progressing     Problem: Skin/Tissue Integrity  Goal: Absence of new skin breakdown  Description: 1.  Monitor for areas of redness and/or skin breakdown  2.  Assess vascular access sites hourly  3.  Every 4-6 hours minimum:  Change oxygen saturation probe site  4.  Every 4-6 hours:  If on nasal continuous positive airway pressure, respiratory therapy assess nares and determine need for appliance change or resting period.  4/4/2024 0032 by Jolie Fitzgerald, RN  Outcome: Progressing     Problem: Safety - Adult  Goal: Free from fall injury  4/4/2024 0032 by Jloie Fitzgerald, RN  Outcome: Progressing     Problem: ABCDS Injury Assessment  Goal: Absence of physical injury  4/4/2024 0032 by Jolie Fitzgerald, RN  Outcome: Progressing

## 2024-04-04 NOTE — PROGRESS NOTES
[facility] Department      Physical Therapy  3223/3223-01  [facility] 3 PROGRESSIVE CARE    [] Initial Evaluation            [x] Daily Treatment Note         [] Discharge Summary      Patient: Charline Renner   : 1940   MRN: [id number]   Date of Service:  2024   Admitting Diagnosis: Intertrochanteric fracture of right femur, closed, initial encounter ([facility])  Ordering Physician: Manjinder Negro MD   Current Admission Summary:   4/3/24 progress note Jose Queen MD,  \"Charline Renner is a 83 y.o. female with a pmh of HTN, A.fib not on AC due to hx of bleeding who presents after a mechanical fall and noted to have R intertrochanteric femur fracture.  S/p fall, mechanical: Patient denies any loss of consciousness  R intertrochanteric femur fracture: s/p R hip IM nail placement  Paroxysmal A-fib: A-fib with RVR in ER currently converted to sinus rhythm.  Patient is not on chronic anticoagulation due to history of GI bleeding.  Patient reports underwent colonoscopy and capsule endoscopy without any source of bleeding.    Hypertension  Hyperlipidemia  Generalized physical deconditioning\"  4/3/2024 Manjinder Negro MD  brief op note Procedure(s) : Right HipIntramedullary nailing. Anesthesia: General , PLANS: The patient will be recovered in the PACU and then be readmitted to  the floor.   4/3/2024 Manjinder Negro MD op note,  \"The patient is Weight bearing as tolerated on the operative leg. The patient will have PT and OT consult. The patient will have 24 hours of prophylactic IV antibiotics. The patient will have DVT prophylaxis.\"  24 Ortho  Progress Note Paul TELLO,  \"    1:  Continue with PT/OT as able.  WBAT.  Dressing change today and prn for any drainage.  Hgb stable today and what I would expect after hip fracture and surgical intervention.  Can use ice for swelling in the hip.  Will likely need SNF at discharge.  2:  Continue Deep venous thrombosis prophylaxis - ASA 81 daily

## 2024-04-05 ENCOUNTER — HOSPITAL ENCOUNTER (INPATIENT)
Age: 84
LOS: 1 days | Discharge: ANOTHER HOSPITAL | DRG: 561 | End: 2024-04-06
Attending: STUDENT IN AN ORGANIZED HEALTH CARE EDUCATION/TRAINING PROGRAM | Admitting: INTERNAL MEDICINE
Payer: MEDICARE

## 2024-04-05 VITALS
DIASTOLIC BLOOD PRESSURE: 72 MMHG | BODY MASS INDEX: 27.29 KG/M2 | TEMPERATURE: 97.8 F | SYSTOLIC BLOOD PRESSURE: 113 MMHG | RESPIRATION RATE: 16 BRPM | OXYGEN SATURATION: 97 % | HEART RATE: 124 BPM | HEIGHT: 65 IN | WEIGHT: 163.8 LBS

## 2024-04-05 VITALS
RESPIRATION RATE: 17 BRPM | WEIGHT: 161 LBS | BODY MASS INDEX: 26.82 KG/M2 | OXYGEN SATURATION: 97 % | SYSTOLIC BLOOD PRESSURE: 131 MMHG | DIASTOLIC BLOOD PRESSURE: 57 MMHG | TEMPERATURE: 98.6 F | HEIGHT: 65 IN | HEART RATE: 93 BPM

## 2024-04-05 PROBLEM — S72.141S: Status: ACTIVE | Noted: 2024-04-05

## 2024-04-05 LAB
HCT VFR BLD AUTO: 30.2 % (ref 36–48)
HCT VFR BLD AUTO: 31.2 % (ref 36–48)
HGB BLD-MCNC: 10.1 G/DL (ref 12–16)
HGB BLD-MCNC: 10.1 G/DL (ref 12–16)

## 2024-04-05 PROCEDURE — 85018 HEMOGLOBIN: CPT

## 2024-04-05 PROCEDURE — 85014 HEMATOCRIT: CPT

## 2024-04-05 PROCEDURE — 36415 COLL VENOUS BLD VENIPUNCTURE: CPT

## 2024-04-05 PROCEDURE — 6360000002 HC RX W HCPCS: Performed by: HOSPITALIST

## 2024-04-05 PROCEDURE — 6370000000 HC RX 637 (ALT 250 FOR IP): Performed by: ORTHOPAEDIC SURGERY

## 2024-04-05 PROCEDURE — 2580000003 HC RX 258: Performed by: ORTHOPAEDIC SURGERY

## 2024-04-05 PROCEDURE — 1200000000 HC SEMI PRIVATE

## 2024-04-05 PROCEDURE — 1280000000 HC REHAB R&B

## 2024-04-05 PROCEDURE — 6370000000 HC RX 637 (ALT 250 FOR IP): Performed by: STUDENT IN AN ORGANIZED HEALTH CARE EDUCATION/TRAINING PROGRAM

## 2024-04-05 PROCEDURE — 94761 N-INVAS EAR/PLS OXIMETRY MLT: CPT

## 2024-04-05 PROCEDURE — 99024 POSTOP FOLLOW-UP VISIT: CPT | Performed by: PHYSICIAN ASSISTANT

## 2024-04-05 PROCEDURE — 6360000004 HC RX CONTRAST MEDICATION: Performed by: ORTHOPAEDIC SURGERY

## 2024-04-05 PROCEDURE — 2700000000 HC OXYGEN THERAPY PER DAY

## 2024-04-05 RX ORDER — BISACODYL 5 MG/1
5 TABLET, DELAYED RELEASE ORAL DAILY PRN
Status: CANCELLED | OUTPATIENT
Start: 2024-04-05

## 2024-04-05 RX ORDER — ALBUTEROL SULFATE 2.5 MG/3ML
2.5 SOLUTION RESPIRATORY (INHALATION) EVERY 6 HOURS PRN
Status: CANCELLED | OUTPATIENT
Start: 2024-04-05

## 2024-04-05 RX ORDER — MULTIVITAMIN WITH IRON
1 TABLET ORAL DAILY
Status: CANCELLED | OUTPATIENT
Start: 2024-04-06

## 2024-04-05 RX ORDER — METOPROLOL SUCCINATE 50 MG/1
100 TABLET, EXTENDED RELEASE ORAL DAILY
Status: CANCELLED | OUTPATIENT
Start: 2024-04-06

## 2024-04-05 RX ORDER — PRAVASTATIN SODIUM 40 MG
40 TABLET ORAL NIGHTLY
Status: CANCELLED | OUTPATIENT
Start: 2024-04-06

## 2024-04-05 RX ORDER — METOPROLOL SUCCINATE 50 MG/1
100 TABLET, EXTENDED RELEASE ORAL DAILY
Status: DISCONTINUED | OUTPATIENT
Start: 2024-04-06 | End: 2024-04-06 | Stop reason: HOSPADM

## 2024-04-05 RX ORDER — POLYETHYLENE GLYCOL 3350 17 G/17G
17 POWDER, FOR SOLUTION ORAL DAILY
Qty: 30 PACKET | Refills: 0 | Status: ON HOLD | OUTPATIENT
Start: 2024-04-05 | End: 2024-05-05

## 2024-04-05 RX ORDER — BISACODYL 5 MG/1
5 TABLET, DELAYED RELEASE ORAL DAILY PRN
Qty: 14 TABLET | Refills: 0 | Status: ON HOLD
Start: 2024-04-05

## 2024-04-05 RX ORDER — ASPIRIN 81 MG/1
81 TABLET, CHEWABLE ORAL DAILY
Status: DISCONTINUED | OUTPATIENT
Start: 2024-04-06 | End: 2024-04-06 | Stop reason: HOSPADM

## 2024-04-05 RX ORDER — BISACODYL 5 MG/1
5 TABLET, DELAYED RELEASE ORAL DAILY PRN
Status: DISCONTINUED | OUTPATIENT
Start: 2024-04-05 | End: 2024-04-06 | Stop reason: HOSPADM

## 2024-04-05 RX ORDER — BISACODYL 10 MG
10 SUPPOSITORY, RECTAL RECTAL DAILY PRN
Status: CANCELLED | OUTPATIENT
Start: 2024-04-05

## 2024-04-05 RX ORDER — ONDANSETRON 2 MG/ML
4 INJECTION INTRAMUSCULAR; INTRAVENOUS EVERY 6 HOURS PRN
Status: CANCELLED | OUTPATIENT
Start: 2024-04-05

## 2024-04-05 RX ORDER — VITAMIN E 268 MG
400 CAPSULE ORAL DAILY
Status: CANCELLED | OUTPATIENT
Start: 2024-04-06

## 2024-04-05 RX ORDER — ALBUTEROL SULFATE 2.5 MG/3ML
2.5 SOLUTION RESPIRATORY (INHALATION) EVERY 6 HOURS PRN
Status: DISCONTINUED | OUTPATIENT
Start: 2024-04-05 | End: 2024-04-06 | Stop reason: HOSPADM

## 2024-04-05 RX ORDER — POLYETHYLENE GLYCOL 3350 17 G/17G
17 POWDER, FOR SOLUTION ORAL DAILY
Status: DISCONTINUED | OUTPATIENT
Start: 2024-04-05 | End: 2024-04-06 | Stop reason: HOSPADM

## 2024-04-05 RX ORDER — BISACODYL 10 MG
10 SUPPOSITORY, RECTAL RECTAL DAILY PRN
Status: DISCONTINUED | OUTPATIENT
Start: 2024-04-05 | End: 2024-04-06 | Stop reason: HOSPADM

## 2024-04-05 RX ORDER — ASPIRIN 81 MG/1
81 TABLET, CHEWABLE ORAL DAILY
Qty: 30 TABLET | Refills: 3 | Status: ON HOLD | OUTPATIENT
Start: 2024-04-06

## 2024-04-05 RX ORDER — DILTIAZEM HCL IN NACL,ISO-OSM 125 MG/125
2.5-15 PLASTIC BAG, INJECTION (ML) INTRAVENOUS CONTINUOUS
Status: CANCELLED | OUTPATIENT
Start: 2024-04-05

## 2024-04-05 RX ORDER — ACETAMINOPHEN 325 MG/1
650 TABLET ORAL EVERY 6 HOURS
Qty: 56 TABLET | Refills: 0 | Status: ON HOLD
Start: 2024-04-05 | End: 2024-04-12

## 2024-04-05 RX ORDER — PRAVASTATIN SODIUM 40 MG
40 TABLET ORAL NIGHTLY
Status: DISCONTINUED | OUTPATIENT
Start: 2024-04-05 | End: 2024-04-06 | Stop reason: HOSPADM

## 2024-04-05 RX ORDER — ENOXAPARIN SODIUM 100 MG/ML
40 INJECTION SUBCUTANEOUS DAILY
Status: DISCONTINUED | OUTPATIENT
Start: 2024-04-06 | End: 2024-04-06 | Stop reason: HOSPADM

## 2024-04-05 RX ORDER — ACETAMINOPHEN 325 MG/1
650 TABLET ORAL EVERY 4 HOURS PRN
Status: CANCELLED | OUTPATIENT
Start: 2024-04-05

## 2024-04-05 RX ORDER — OXYCODONE HCL 5 MG/5 ML
5 SOLUTION, ORAL ORAL EVERY 4 HOURS PRN
Status: CANCELLED | OUTPATIENT
Start: 2024-04-05

## 2024-04-05 RX ORDER — ENOXAPARIN SODIUM 100 MG/ML
40 INJECTION SUBCUTANEOUS DAILY
Status: CANCELLED | OUTPATIENT
Start: 2024-04-06

## 2024-04-05 RX ORDER — SODIUM CHLORIDE 9 MG/ML
INJECTION, SOLUTION INTRAVENOUS PRN
Status: CANCELLED | OUTPATIENT
Start: 2024-04-05

## 2024-04-05 RX ORDER — FONDAPARINUX SODIUM 2.5 MG/.5ML
2.5 INJECTION SUBCUTANEOUS DAILY
Qty: 1 ML | Refills: 0 | Status: ON HOLD | OUTPATIENT
Start: 2024-04-05 | End: 2024-04-26

## 2024-04-05 RX ORDER — OXYCODONE HCL 5 MG/5 ML
5 SOLUTION, ORAL ORAL EVERY 4 HOURS PRN
Status: DISCONTINUED | OUTPATIENT
Start: 2024-04-05 | End: 2024-04-06 | Stop reason: HOSPADM

## 2024-04-05 RX ORDER — SODIUM CHLORIDE 0.9 % (FLUSH) 0.9 %
10 SYRINGE (ML) INJECTION EVERY 12 HOURS SCHEDULED
Status: CANCELLED | OUTPATIENT
Start: 2024-04-05

## 2024-04-05 RX ORDER — MULTIVITAMIN WITH IRON
1 TABLET ORAL DAILY
Status: DISCONTINUED | OUTPATIENT
Start: 2024-04-05 | End: 2024-04-06 | Stop reason: HOSPADM

## 2024-04-05 RX ORDER — SODIUM CHLORIDE 0.9 % (FLUSH) 0.9 %
10 SYRINGE (ML) INJECTION PRN
Status: CANCELLED | OUTPATIENT
Start: 2024-04-05

## 2024-04-05 RX ORDER — DILTIAZEM HYDROCHLORIDE 5 MG/ML
10 INJECTION INTRAVENOUS ONCE
Status: CANCELLED | OUTPATIENT
Start: 2024-04-05 | End: 2024-04-05

## 2024-04-05 RX ORDER — OXYCODONE HYDROCHLORIDE 5 MG/1
5 TABLET ORAL EVERY 6 HOURS PRN
Qty: 12 TABLET | Refills: 0 | Status: ON HOLD | OUTPATIENT
Start: 2024-04-05 | End: 2024-04-08

## 2024-04-05 RX ORDER — ENOXAPARIN SODIUM 100 MG/ML
40 INJECTION SUBCUTANEOUS DAILY
Status: DISCONTINUED | OUTPATIENT
Start: 2024-04-05 | End: 2024-04-05 | Stop reason: HOSPADM

## 2024-04-05 RX ORDER — VITAMIN E 268 MG
400 CAPSULE ORAL DAILY
Status: DISCONTINUED | OUTPATIENT
Start: 2024-04-06 | End: 2024-04-06 | Stop reason: HOSPADM

## 2024-04-05 RX ORDER — ACETAMINOPHEN 325 MG/1
650 TABLET ORAL EVERY 4 HOURS PRN
Status: DISCONTINUED | OUTPATIENT
Start: 2024-04-05 | End: 2024-04-06 | Stop reason: HOSPADM

## 2024-04-05 RX ORDER — POLYETHYLENE GLYCOL 3350 17 G/17G
17 POWDER, FOR SOLUTION ORAL DAILY
Status: CANCELLED | OUTPATIENT
Start: 2024-04-06

## 2024-04-05 RX ORDER — ASPIRIN 81 MG/1
81 TABLET, CHEWABLE ORAL DAILY
Status: CANCELLED | OUTPATIENT
Start: 2024-04-06

## 2024-04-05 RX ADMIN — THERA TABS 1 TABLET: TAB at 16:16

## 2024-04-05 RX ADMIN — ENOXAPARIN SODIUM 40 MG: 100 INJECTION SUBCUTANEOUS at 07:55

## 2024-04-05 RX ADMIN — PRAVASTATIN SODIUM 40 MG: 40 TABLET ORAL at 20:24

## 2024-04-05 RX ADMIN — ASPIRIN 81 MG 81 MG: 81 TABLET ORAL at 08:00

## 2024-04-05 RX ADMIN — POLYETHYLENE GLYCOL 3350 17 G: 17 POWDER, FOR SOLUTION ORAL at 16:16

## 2024-04-05 RX ADMIN — Medication 10 ML: at 07:55

## 2024-04-05 RX ADMIN — ONDANSETRON 4 MG: 4 TABLET, ORALLY DISINTEGRATING ORAL at 11:56

## 2024-04-05 RX ADMIN — PRAVASTATIN SODIUM 40 MG: 20 TABLET ORAL at 07:55

## 2024-04-05 RX ADMIN — METOPROLOL SUCCINATE 100 MG: 100 TABLET, EXTENDED RELEASE ORAL at 10:05

## 2024-04-05 RX ADMIN — PERFLUTREN 1.5 ML: 6.52 INJECTION, SUSPENSION INTRAVENOUS at 09:18

## 2024-04-05 RX ADMIN — ACETAMINOPHEN 650 MG: 325 TABLET ORAL at 20:24

## 2024-04-05 ASSESSMENT — PAIN SCALES - GENERAL
PAINLEVEL_OUTOF10: 6
PAINLEVEL_OUTOF10: 4

## 2024-04-05 ASSESSMENT — PAIN - FUNCTIONAL ASSESSMENT: PAIN_FUNCTIONAL_ASSESSMENT: ACTIVITIES ARE NOT PREVENTED

## 2024-04-05 ASSESSMENT — PAIN DESCRIPTION - FREQUENCY: FREQUENCY: INTERMITTENT

## 2024-04-05 ASSESSMENT — PAIN DESCRIPTION - DESCRIPTORS: DESCRIPTORS: ACHING

## 2024-04-05 ASSESSMENT — PAIN DESCRIPTION - PAIN TYPE: TYPE: SURGICAL PAIN

## 2024-04-05 ASSESSMENT — PAIN DESCRIPTION - ONSET: ONSET: GRADUAL

## 2024-04-05 ASSESSMENT — PAIN DESCRIPTION - LOCATION: LOCATION: HIP

## 2024-04-05 ASSESSMENT — PAIN DESCRIPTION - ORIENTATION: ORIENTATION: MID

## 2024-04-05 NOTE — PLAN OF CARE
ARU PATIENT TREATMENT PLAN  [address] [address]  [phone]      Charline Renner    : 1940  [facility] #: [id number]  MRN: [id number]  PHYSICIAN:  Jairo Marie MD  Primary Problem    Patient Active Problem List   Diagnosis    Intertrochanteric fracture of right femur, closed, initial encounter ([facility])    Atrial fibrillation with RVR ([facility])    Closed fracture of femur, intertrochanteric, right, sequela       Rehabilitation Diagnosis:  ***    ADMIT DATE:2024    Patient Goals: ***  Admitting Impairments: ***  Activities: ***  Participation: Prior to admission patient was living at home ***   CARE PLAN     NURSING:  Charline Renner while on this unit will:  [x] Be continent of bowel and bladder     [x] Have an adequate number of bowel movements  [x] Urinate with no urinary retention >300ml in bladder  [] Complete bladder protocol with harmon removal  [x] Maintain O2 SATs at 92___%  [x] Have pain managed while on ARU       [] Be pain free by discharge   [x] Have no skin breakdown while on ARU  [] Have improved skin integrity via wound measurements  [x] Have no signs/symptoms of infection at the wound site  [x] Be free from injury during hospitalization   [x] Complete education with patient/family with understanding demonstrated for:  [] Adjustment   [] Other:     Nursing Interventions will include:  [x] bowel/bladder training   [x] education for medical assistive devices   [x] medication education   [x] O2 saturation management   [x] energy conservation   [x] stress management techniques   [x] fall prevention   [x] alarms protocol   [x] seating and positioning   [x] skin/wound care   [x] pressure relief instruction   [x] dressing changes     [x] infection protection   [x] DVT prophylaxis  [x] assistance with in room safety with transfers to bed, toilet, wheelchair, shower   [x] bathroom activities and hygiene  [] Other:    Patient/Caregiver Education for:  []

## 2024-04-05 NOTE — PLAN OF CARE
Problem: Discharge Planning  Goal: Discharge to home or other facility with appropriate resources  4/5/2024 0611 by Jolie Fitzgerald, RN  Outcome: Progressing  4/4/2024 1940 by Miles Massey RN  Outcome: Progressing  Flowsheets (Taken 4/4/2024 1940)  Discharge to home or other facility with appropriate resources:   Identify barriers to discharge with patient and caregiver   Identify discharge learning needs (meds, wound care, etc)   Refer to discharge planning if patient needs post-hospital services based on physician order or complex needs related to functional status, cognitive ability or social support system   Arrange for needed discharge resources and transportation as appropriate   Arrange for interpreters to assist at discharge as needed     Problem: Pain  Goal: Verbalizes/displays adequate comfort level or baseline comfort level  4/5/2024 0611 by Jolie Fitzgerald RN  Outcome: Progressing  4/4/2024 1940 by Miles Massey RN  Outcome: Progressing  Flowsheets (Taken 4/4/2024 1940)  Verbalizes/displays adequate comfort level or baseline comfort level:   Encourage patient to monitor pain and request assistance   Administer analgesics based on type and severity of pain and evaluate response   Consider cultural and social influences on pain and pain management   Assess pain using appropriate pain scale   Implement non-pharmacological measures as appropriate and evaluate response   Notify Licensed Independent Practitioner if interventions unsuccessful or patient reports new pain     Problem: Skin/Tissue Integrity  Goal: Absence of new skin breakdown  Description: 1.  Monitor for areas of redness and/or skin breakdown  2.  Assess vascular access sites hourly  3.  Every 4-6 hours minimum:  Change oxygen saturation probe site  4.  Every 4-6 hours:  If on nasal continuous positive airway pressure, respiratory therapy assess nares and determine need for appliance change or resting period.  4/5/2024 0611 by Jolie Fitzgerald,  RN  Outcome: Progressing  4/4/2024 1940 by Miles Massey RN  Outcome: Progressing     Problem: Safety - Adult  Goal: Free from fall injury  4/5/2024 0611 by Jolie Fitzgerald RN  Outcome: Progressing  4/4/2024 1940 by Miles Massey RN  Outcome: Progressing  Flowsheets (Taken 4/4/2024 1940)  Free From Fall Injury:   Instruct family/caregiver on patient safety   Based on caregiver fall risk screen, instruct family/caregiver to ask for assistance with transferring infant if caregiver noted to have fall risk factors     Problem: ABCDS Injury Assessment  Goal: Absence of physical injury  4/5/2024 0611 by Jolie Fitzgerald RN  Outcome: Progressing  4/4/2024 1940 by Miles Massey RN  Outcome: Progressing  Flowsheets (Taken 4/4/2024 1940)  Absence of Physical Injury: Implement safety measures based on patient assessment

## 2024-04-05 NOTE — DISCHARGE INSTRUCTIONS
Keep dressing clean and dry.  Change Mepilex every 3-5 days or as needed for excess drainage.  Please call physician if increased redness around incision, increased drainage, fevers, or pain becomes significantly worse.  Do not immerse in water such as baths but okay to shower with dressing on to protect wound.    F/U with Dr Negro in 10-14 days.  Call [facility] for appointment time and date for follow up at [phone].        Weight Bearing on Surgical Side: full    Continue DVT Prophylaxis: Arixtra 2.5mg daily    Pain Medications  You were given oxycodone (Oxycontin, Oxyir)  Wean off pain medications as you deem appropriate as long as pain is under control  Be sure to drink plenty of fluids (recommend water) while taking narcotic pain medications to prevent constipation   You may take an over the counter laxative or stool softener as needed to prevent/treat constipation as well, we recommend Senokot S OTC.  We recommend that you consider taking these medications the entire time you are taking pain medication.  Cold packs/Ice packs/Machine  May be used as much as necessary to control swelling/inflammation/soreness  Be sure to have a barrier (cloth, clothing, towel) between the site and the ice pack to prevent frostbite  Contact [facility] office [phone] if  Increased redness, swelling, drainage of any kind, and/or pain to surgery site.  As well as new onset fevers and or chills.  These could signify an infection.  Calf or thigh tenderness to touch as well as increased swelling or redness.  This could signify a clot formation.  Numbness or tingling to an area around the incision site or below the incision site (toes).  Any rash appears, increased  or new onset nausea/vomiting occur.  This may indicate a reaction to a medication.

## 2024-04-05 NOTE — DISCHARGE SUMMARY
Discharge Summary    Name:  Charline Renner /Age/Sex: 1940 (83 y.o. female)   Admit Date: 2024  Discharge Date: 24    MRN & CSN:  [id number] & [id number] Encounter Date and Time 24    Attending:  Jose Queen MD Discharging Provider: Jose Queen MD     Hospital Course:   REASON FOR ADMISSION/CHIEF COMPLAINT:   Intertrochanteric fracture of right femur, closed, initial encounter ([facility])    PRINCIPAL DIAGNOSIS* AND HOSPITAL PROBLEM LIST:  Intertrochanteric fracture of right femur, closed, initial encounter ([facility]) [S72.141A]    CONSULTS DURING THE ADMISSION:  IP CONSULT TO HOSPITALIST  IP CONSULT TO ORTHOPEDIC SURGERY  IP CONSULT TO CARDIOLOGY  IP CONSULT TO SOCIAL WORK  IP CONSULT TO PHYSICAL MEDICINE REHAB    BRIEF HPI & SUMMARY OF HOSPITAL COURSE:   Charline Renner is a 83 y.o. female with a pmh of HTN, A.fib not on AC due to hx of bleeding who presents after a mechanical fall and noted to have R intertrochanteric femur fracture. Admitted for further evaluation and management. Please review H&P for full details. In summary, during the course of hospitalization patient was found to have following problems including  S/p fall, mechanical: no loss of consciousness  R intertrochanteric femur fracture: s/p R hip IM nail placement 4/3/24. Tolerated procedure well. Plan for inpt rehab for PT/OT. High risk for thromboembolism d/t PAF, will recommend arixtra/lovenox in ppx dose for 21 days. F/u with Orthopedic surgery as outpt.  Acute blood loss anemia likely d/t surgical blood loss and dilutional effect: No concern for surgical site hematoma per ortho surgery team. No blood in stool. Monitor H&H at IPR.   Paroxysmal A-fib: A-fib with RVR in ER currently converted to sinus rhythm.  Patient reports not on chronic anticoagulation due to severe GI bleeding in past.  F/b cardiology team, recommended outpt f/u with cardiology for consideration for watchman device.  Hypertension  Hyperlipidemia  Generalized  differentiation is within normal limits. Mild patchy periventricular white matter hypodensities, likely chronic microvascular ischemic changes. Ventricles and sulci are mildly prominent compatible with age related cerebral volume loss. Basal cisterns are clear. Visualized paranasal sinuses are clear. Visualized mastoid air cells are clear. Calvarium is intact. CT cervical spine: Reversal of normal cervical lordosis. Vertebral body height and alignment are preserved. No acute fracture or traumatic subluxation. No prevertebral soft tissue swelling. Multilevel degenerative changes of the spine in the form of disc space height loss, marginal osteophytosis, facet and uncovertebral hypertrophy. Mild to moderate spinal canal stenosis at C6-7 due to prominent disc osteophyte complex. Moderate to severe multilevel neural foraminal narrowing, most advanced at C4-5 and C6-7. No paraspinal soft tissue abnormality.     CT brain: No acute intracranial hemorrhage or mass effect. No calvarial fracture. CT cervical spine: No acute vertebral fracture or traumatic subluxation.  Electronically signed by Tirso Luo MD    XR CHEST PORTABLE    Result Date: 4/1/2024  EXAM: XR CHEST PORTABLE INDICATION: 83 years Female; \"hip fx\" COMPARISON: None FINDINGS: Enlarged cardiac silhouette. No consolidation, pleural effusion, or pneumothorax. No acute osseous abnormality.     Mildly enlarged cardiac silhouette. Electronically signed by Tirso Luo MD    XR FEMUR RIGHT (MIN 2 VIEWS)    Result Date: 4/1/2024  Exam: XR FEMUR RIGHT (MIN 2 VIEWS) History: 83 years Female; \"fall hip and thigh pain\". Comparison: None available Findings: Comminuted displaced intertrochanteric right femur fracture. No dislocation. No osseous erosion. No significant soft tissue abnormality. Proximal tibial fixation hardware.     Impression: Displaced comminuted intertrochanteric right femur fracture. Electronically signed by Tirso Luo MD      Total time Spent co-ordinating

## 2024-04-05 NOTE — DISCHARGE INSTR - DIET

## 2024-04-05 NOTE — PROGRESS NOTES
Department of Orthopedic Surgery  Physician Assistant   Progress Note    Subjective:       Systemic or Specific Complaints:Pain Control much better today.  Sitting in a chair today.  Tired.    Objective:     Patient Vitals for the past 24 hrs:   BP Temp Temp src Pulse Resp SpO2   04/05/24 1005 (!) 126/55 -- -- 94 -- --   04/05/24 0810 -- -- -- 94 18 97 %   04/05/24 0745 (!) 126/55 98.6 °F (37 °C) Oral 94 17 --   04/05/24 0431 (!) 122/58 98.2 °F (36.8 °C) Oral 99 18 97 %   04/04/24 2329 117/66 98.2 °F (36.8 °C) Oral (!) 103 18 97 %   04/04/24 1942 (!) 120/57 99.1 °F (37.3 °C) Oral 99 18 97 %   04/04/24 1513 119/63 98.6 °F (37 °C) Oral 95 18 96 %   04/04/24 1204 (!) 121/54 98.2 °F (36.8 °C) Oral 96 18 98 %         General: alert, appears stated age, and cooperative   Wound: Wound clean and dry no evidence of infection., No Erythema, No Drainage, Wound Intact, and Positive for Edema   Motion: Painful range of Motion in affected extremity   DVT Exam: No evidence of DVT seen on physical exam.     Additional exam:       Data Review  CBC:   Lab Results   Component Value Date/Time    WBC 10.5 04/04/2024 01:09 AM    RBC 3.56 04/04/2024 01:09 AM    HGB 10.1 04/05/2024 08:46 AM    HCT 30.2 04/05/2024 08:46 AM     04/04/2024 01:09 AM       Renal:   Lab Results   Component Value Date/Time     04/04/2024 01:09 AM    K 4.4 04/04/2024 01:09 AM     04/04/2024 01:09 AM    CO2 24 04/04/2024 01:09 AM    BUN 21 04/04/2024 01:09 AM    CREATININE 0.7 04/04/2024 01:09 AM    GLUCOSE 119 04/04/2024 01:09 AM    CALCIUM 7.8 04/04/2024 01:09 AM            Assessment:      right hip IM nail POD 2.  Acute blood loss anemia - expected after surgery. Will monitor Hgb      Plan:      1:  Continue with PT/OT as able.  WBAT.  Hgb stable today and what I would expect after hip fracture and surgical intervention.  Can use ice for swelling in the hip.  Will likely need SNF at discharge.  Okay to d/c from ortho standpoint.  2:

## 2024-04-05 NOTE — CARE COORDINATION
DISCHARGE ORDER  Date/Time 2024 11:30 AM  Completed by: ABHISHEK Browning, Case Management    Patient Name: Charline Renner      : 1940  Admitting Diagnosis: Intertrochanteric fracture of right femur, closed, initial encounter ([facility]) [S72.141A]      Admit order Date and Status:2024  (verify MD's last order for status of admission)      Noted discharge order.   If applicable PT/OT recommendation at Discharge: ARU  Confirmed discharge plan: Yes  with whom PT and Son([facility])  If pt confirmed DC plan does family need to be contacted by CM No   Discharge Plan: Plan is [facility] via ambulance [facility] [phone] @ [facility].  Report is [phone]   Date of Last IMM Given: 2024    Reviewed chart.  Role of discharge planner explained and patient verbalized understanding. Discharge order is noted.    Has Home O2 in place on admit:  No  Informed of need to bring portable home O2 tank on day of discharge for nursing to connect prior to leaving:   Not Indicated  Verbalized agreement/Understanding:   Not Indicated  Pt is being d/c'd to [facility] today. Pt's O2 sats are 97% on 2l.    Discharge timeout done with  pt rn. All discharge needs and concerns addressed.

## 2024-04-05 NOTE — FLOWSHEET NOTE
04/05/24 0745   Vital Signs   Temp 98.6 °F (37 °C)   Temp Source Oral   Pulse 94   Heart Rate Source Monitor   Respirations 17   BP (!) 126/55   MAP (Calculated) 79   BP Location Left upper arm   BP Method Automatic   Patient Position Semi fowlers   Pain Assessment   Pain Assessment None - Denies Pain         Patient resting in bed. Patient alert and oriented x4.  GCS 15/15.  Skin appropriate for ethnicity, dry and intact.  No signs of acute distress noted at this time. Regular respiratory pattern, normal respiratory depth, unlabored respirations.   Denies pain.   Cardiac Rhythm AFIB.   Patient is continuous cardiac monitoring. Telemetry Alarms audible and alarms set.  Fall risk precautions in place, call light in reach, bed in lowest position, bed side table within reach,  2/2 bedrails up, bed alarm on.   Denies any needs at this time.  Plan of care ongoing.  Call light in reach.     MD aware of blood pressure and wants metoprolol to be given still.

## 2024-04-05 NOTE — DISCHARGE INSTR - COC
Continuity of Care Form    Patient Name: Charline Renner   :  1940  MRN:  [id number]    Admit date:  2024  Discharge date:  24    Code Status Order: Full Code   Advance Directives:   Advance Care Flowsheet Documentation       Date/Time Healthcare Directive Type of Healthcare Directive Copy in Chart Healthcare Agent Appointed Healthcare Agent's Name Healthcare Agent's Phone Number    24 0638 Yes, patient has an advance directive for healthcare treatment Durable power of  for health care Yes, copy in chart Healthcare power of  Danii Renner [phone]    24 0524 Yes, patient has an advance directive for healthcare treatment Durable power of  for health care Yes, copy in chart Healthcare power of  Danii Renner [phone]            Admitting Physician:  Angela Sheets MD  PCP: Denny Jensen    Discharging Nurse: ABRAM Jones  Discharging Hospital Unit/Room#: 3223/3223-01  Discharging Unit Phone Number: [phone]    Emergency Contact:   Extended Emergency Contact Information  Primary Emergency Contact: danii renner  Home Phone: [phone]  Mobile Phone: [phone]  Relation: Child    Past Surgical History:  Past Surgical History:   Procedure Laterality Date    HIP SURGERY Right 4/3/2024    RIGHT HIP INTRAMEDULLARY NAILING performed by Manjinder Negro MD at [facility] OR       Immunization History:   Immunization History   Administered Date(s) Administered    COVID-19, PFIZER PURPLE top, DILUTE for use, (age 12 y+), 30mcg/0.3mL 2021, 2021, 10/09/2021       Active Problems:  Patient Active Problem List   Diagnosis Code    Intertrochanteric fracture of right femur, closed, initial encounter ([facility]) S72.141A    Atrial fibrillation with RVR ([facility]) I48.91       Isolation/Infection:   Isolation            No Isolation          Patient Infection Status       None to display            Nurse Assessment:  Last Vital Signs: BP (!) 126/55   Pulse 94   Temp 98.6 °F

## 2024-04-05 NOTE — PROGRESS NOTES
0800 per shift assessment Q4 vascular check    +2 RUE, moderate  +2 LUE, moderate    +1, doppler, LLE, Weak  +1, doppler,  RLE, Weak     1200 per shift assessment Q4 vascular check    +2 RUE, moderate  +2 LUE, moderate    +1, doppler, LLE, Weak  +1, doppler,  RLE, Weak       See Flow Sheet for additional information

## 2024-04-05 NOTE — PROGRESS NOTES
ARU Admission Assessment    Ethnicity  \"Are you of , /a, or Cypriot origin?\"  Check all that apply:  [x] A.  No, not of , /a, or Cypriot Origin  [] B.  Yes, Peruvian, Peruvian American, Chicano/a  [] C.  Yes, Slovak  [] D.  Yes, Domenic  [] E.  Yes, another , , or Cypriot origin  [] X.  Patient unable to respond  [] Y.  Patient declines to respond    Race  \"What is your race?\"  Check all that apply:  [x] A.  White  [] B.  Black or   [] C.   or   [] D.   German  [] E.  Chinese  [] F.  Mauritian  [] G. Chadian  [] H.  Kazakh  [] I.  Belarusian  [] J.  Other   [] K.    [] L.  Angolan or Sen  [] M.  Indonesian  [] N.  Other   [] X.  Patient unable to respond  [] Y.  Patient declines to respond  [] Z.  None of the above    Language  A.  \"What is your preferred language?\"   English    B.  \"Do you need or want an  to communicate with a doctor or health care staff?\"  Check only one:  [x] 0.  No  [] 1.  Yes  [] 9.  Unable to determine    Transportation  \"Has lack of transportation kept you from medical appointments, meetings, work, or from getting things needed for daily living?\"Check all that apply:  [] A.  Yes, it has kept me from medical appointments or from getting my medications  [] B.  Yes, it has kept me from non-medical meetings, appointments, work, or from getting things that I need  [x] C.  No  [] X.  Patient unable to respond  [] Y.  Patient declines to respond    Hearing  Ability to hear (with hearing aid or hearing appliances if normally used)  [x]  0.  Adequate - no difficulty in normal conversation, social interaction, listening to TV  []  1.  Minimal difficulty - difficulty in some environments (e.g. when person speaks softly or setting is noisy)  []  2.  Moderate difficulty - speaker has to increase volume and speak distinctly   []  3.  Highly impaired - absence of  score 0-27, or enter 99 if unable to complete (if symptom frequency (column 2) is blank for 3 or more items).   0     Social Isolation  \"How often do you feel lonely or isolated from those around you?\"  [x] 0.  Never  [] 1.  Rarely  [] 2.  Sometimes  [] 3.  Often  [] 4.  Always  [] 7.  Patient declines to respond  [] 8.  Patient unable to respond    Pain Effect on Sleep  \"Over the past 5 days, how much of the time has pain made it hard for you to sleep at night?\"  []  0.  Does not apply - I have not had any pain or hurting in the past 5 days  [x]  1.  Rarely or not at all  []  2.  Occasionally  []  3.  Frequently  []  4.  Almost constantly  []  8.  Unable to answer    **If the patient answers \"0.  Does not apply\" to this question, skip the next two \"Pain Effect...\" questions**    Pain Interference with Therapy Activities  \"Over the past 5 days, how often have you limited your participation in rehabilitation therapy sessions due to pain?\"  []  0.  Does not apply - I have not received rehabilitation therapy in the past 5 days  [x]  1.  Rarely or not at all  []  2.  Occasionally  []  3.  Frequently  []  4.  Almost constantly  []  8.  Unable to answer    Pain Interference with Day-to-Day Activities:  \"Over the past 5 days, how often have you limited your day-to-day activities (excluding rehabilitation therapy session)?\"  [x]  1.  Rarely or not at all  []  2.  Occasionally  []  3.  Frequently  []  4.  Almost constantly  []  8.  Unable to answer    Nutritional Approaches  Check all of the following nutritional approaches that apply on admission:  []  A.  Parenteral/IV feeding (including IV fluids if needed for hydration, but not as part of dialysis/chemo)  []  B.  Feeding tube (e.g., nasogastric or abdominal (PEG))  []  C.  Mechanically altered diet - requires change in texture of food or liquids (e.g., pureed food, thickened liquids)  [x]  D.  Therapeutic diet (e.g., low salt, diabetic, low cholesterol)  []  Z.  None of

## 2024-04-05 NOTE — PROGRESS NOTES
Pt arrived to the room. Saturating in higher 70s in room air. Nasal cannula @4L (not a baseline, pt does not use oxygen at home). VSS. All safety measures in place. The care plan and education has been reviewed and mutually agreed upon with the patient.

## 2024-04-05 NOTE — PROGRESS NOTES
Discharge instructions and medications reviewed with patient and patient's son, Jericho. Patient and patient's son voices understanding and denies further questions/ concerns at this time. IV was removed per protocol with no implications. Pt shows no signs of acute distress. Patient belongings were packed per patient and patients son. This RN escorted patient to the stretcher and escorted [facility] out to the elevators. Patient and patient's family denies any questions or needs at this time.       Report was given to [facility]. They deny any questions or concerns at this time.     [facility] to registered nurse, Apple. Apple denies any needs at this time.

## 2024-04-05 NOTE — PROGRESS NOTES
Patient was admitted to room 4903 at 1350.  Patient was oriented to the Call Light, Phone, TV, Thermostat, Bed Controls, Bathroom and Emergency Cord.  Patient verbalized and demonstrated understanding of all.  Patient was also given an overview of Unit Routines for Acute Rehab, including the patient's rights and responsibilities as well as the CMS IRF [place] Privacy Act Statement providing notice of data collection.  Patient states that their normal bowel regime is everyday. Meal times were explained, including how to order food.  The white board, (which is posted on the wall by the door is used for communication) has the Therapy Scheduled that is posted each day along with the name of your doctor, nurse, and therapist for your convenience.  We recommend any family that will be care givers or any care givers the patient has, take part in therapy.  We have no set visiting hours, we suggest non-caregiver friends and family visitors come after therapy (at 4 PM or later) to allow patient to rest in between sessions.      In conjunction with the patient and patient’s family, this nurse worked to establish a tailored Fall TIPS plan to ensure patient safety and compliance:    Falls TIPS Completion    Patient identified as increased risk for harm if fall:  [] Yes     Fall Risks  History of Falls:    [x] Yes   Medication Side Effects:   [] Yes   Walking Aid:    [] Yes   IV Pole or Equipment:   [] Yes   Unsteady Walk:     [x] Yes   May Forget or Choose Not to Call: [] Yes     Fall Interventions   Communicate Recent Fall and/or Risk of Harm: [] Yes   Walking Aids:  Crutches: [] Yes   Cane: [] Yes   Walker: [x] Yes   IV Assistance When Walking: [] Yes   Toileting Schedule: Every 2 Hours  Bedpan:   [] Yes   Assist to Commode: [] Yes   Assist to Bathroom: [x] Yes   Bed Alarm On: [x] Yes   Assistance Out of Bed:  Bedrest: [] Yes   1 Person: [x] Yes   2 People: [] Yes

## 2024-04-05 NOTE — PROGRESS NOTES
4 Eyes Skin Assessment     NAME:  Charline Renner  YOB: 1940  MEDICAL RECORD NUMBER:  [id number]    The patient is being assessed for  Admission    I agree that at least one RN has performed a thorough Head to Toe Skin Assessment on the patient. ALL assessment sites listed below have been assessed.      Areas assessed by both nurses:    Head, Face, Ears, Shoulders, Back, Chest, Arms, Elbows, Hands, Sacrum. Buttock, Coccyx, Ischium, Legs. Feet and Heels, and Under Medical Devices         Does the Patient have a Wound? No noted wound(s)       Sherif Prevention initiated by RN: Yes  Wound Care Orders initiated by RN: No    Pressure Injury (Stage 3,4, Unstageable, DTI, NWPT, and Complex wounds) if present, place Wound referral order by RN under : No    New Ostomies, if present place, Ostomy referral order under : Lexii     Nurse 1 eSignature: Electronically signed by Vale Valle RN on 4/5/24 at 2:35 PM EDT    **SHARE this note so that the co-signing nurse can place an eSignature**    Nurse 2 eSignature: Electronically signed by Jeny Shultz RN on 4/5/24 at 2:37 PM EDT

## 2024-04-06 ENCOUNTER — APPOINTMENT (OUTPATIENT)
Dept: GENERAL RADIOLOGY | Age: 84
DRG: 309 | End: 2024-04-06
Attending: INTERNAL MEDICINE
Payer: MEDICARE

## 2024-04-06 ENCOUNTER — APPOINTMENT (OUTPATIENT)
Dept: CT IMAGING | Age: 84
DRG: 309 | End: 2024-04-06
Attending: INTERNAL MEDICINE
Payer: MEDICARE

## 2024-04-06 ENCOUNTER — HOSPITAL ENCOUNTER (INPATIENT)
Age: 84
LOS: 2 days | Discharge: NURSING, REHAB OR LONG-TERM CARE | DRG: 309 | End: 2024-04-08
Attending: INTERNAL MEDICINE | Admitting: INTERNAL MEDICINE
Payer: MEDICARE

## 2024-04-06 PROBLEM — R79.89 ELEVATED TROPONIN: Status: ACTIVE | Noted: 2024-04-06

## 2024-04-06 PROBLEM — Z87.19 HISTORY OF GASTROINTESTINAL BLEEDING: Status: ACTIVE | Noted: 2024-04-06

## 2024-04-06 PROBLEM — I25.10 CORONARY ARTERY DISEASE INVOLVING NATIVE CORONARY ARTERY OF NATIVE HEART WITHOUT ANGINA PECTORIS: Status: ACTIVE | Noted: 2024-04-06

## 2024-04-06 LAB
ALBUMIN SERPL-MCNC: 2.8 G/DL (ref 3.4–5)
ALBUMIN/GLOB SERPL: 1 {RATIO} (ref 1.1–2.2)
ALP SERPL-CCNC: 64 U/L (ref 40–129)
ALT SERPL-CCNC: 12 U/L (ref 10–40)
ANION GAP SERPL CALCULATED.3IONS-SCNC: 10 MMOL/L (ref 3–16)
ANTI-XA UNFRAC HEPARIN: 0.22 IU/ML (ref 0.3–0.7)
ANTI-XA UNFRAC HEPARIN: 0.67 IU/ML (ref 0.3–0.7)
APTT BLD: 32.3 SEC (ref 22.1–36.4)
AST SERPL-CCNC: 31 U/L (ref 15–37)
BASOPHILS # BLD: 0.1 K/UL (ref 0–0.2)
BASOPHILS NFR BLD: 0.5 %
BILIRUB SERPL-MCNC: 1.6 MG/DL (ref 0–1)
BUN SERPL-MCNC: 31 MG/DL (ref 7–20)
CALCIUM SERPL-MCNC: 8.5 MG/DL (ref 8.3–10.6)
CHLORIDE SERPL-SCNC: 100 MMOL/L (ref 99–110)
CO2 SERPL-SCNC: 24 MMOL/L (ref 21–32)
CREAT SERPL-MCNC: 0.7 MG/DL (ref 0.6–1.2)
DEPRECATED RDW RBC AUTO: 13.2 % (ref 12.4–15.4)
DEPRECATED RDW RBC AUTO: 13.3 % (ref 12.4–15.4)
EOSINOPHIL # BLD: 0.2 K/UL (ref 0–0.6)
EOSINOPHIL NFR BLD: 2.2 %
GFR SERPLBLD CREATININE-BSD FMLA CKD-EPI: 84 ML/MIN/{1.73_M2}
GLUCOSE SERPL-MCNC: 136 MG/DL (ref 70–99)
HCT VFR BLD AUTO: 27.9 % (ref 36–48)
HCT VFR BLD AUTO: 29.2 % (ref 36–48)
HCT VFR BLD AUTO: 30.1 % (ref 36–48)
HCT VFR BLD AUTO: 30.7 % (ref 36–48)
HGB BLD-MCNC: 10 G/DL (ref 12–16)
HGB BLD-MCNC: 10.6 G/DL (ref 12–16)
HGB BLD-MCNC: 9.6 G/DL (ref 12–16)
HGB BLD-MCNC: 9.8 G/DL (ref 12–16)
INR PPP: 1.27 (ref 0.85–1.15)
LYMPHOCYTES # BLD: 2.1 K/UL (ref 1–5.1)
LYMPHOCYTES NFR BLD: 19.7 %
MCH RBC QN AUTO: 31.5 PG (ref 26–34)
MCH RBC QN AUTO: 32.7 PG (ref 26–34)
MCHC RBC AUTO-ENTMCNC: 32.7 G/DL (ref 31–36)
MCHC RBC AUTO-ENTMCNC: 34.5 G/DL (ref 31–36)
MCV RBC AUTO: 95 FL (ref 80–100)
MCV RBC AUTO: 96.4 FL (ref 80–100)
MONOCYTES # BLD: 1 K/UL (ref 0–1.3)
MONOCYTES NFR BLD: 10 %
NEUTROPHILS # BLD: 7.1 K/UL (ref 1.7–7.7)
NEUTROPHILS NFR BLD: 67.6 %
PLATELET # BLD AUTO: 176 K/UL (ref 135–450)
PLATELET # BLD AUTO: 180 K/UL (ref 135–450)
PMV BLD AUTO: 9.9 FL (ref 5–10.5)
PMV BLD AUTO: 9.9 FL (ref 5–10.5)
POTASSIUM SERPL-SCNC: 4.5 MMOL/L (ref 3.5–5.1)
PROT SERPL-MCNC: 5.6 G/DL (ref 6.4–8.2)
PROTHROMBIN TIME: 16.1 SEC (ref 11.9–14.9)
RBC # BLD AUTO: 3.12 M/UL (ref 4–5.2)
RBC # BLD AUTO: 3.23 M/UL (ref 4–5.2)
SODIUM SERPL-SCNC: 134 MMOL/L (ref 136–145)
WBC # BLD AUTO: 10.5 K/UL (ref 4–11)
WBC # BLD AUTO: 9.3 K/UL (ref 4–11)

## 2024-04-06 PROCEDURE — 6360000002 HC RX W HCPCS: Performed by: PHYSICIAN ASSISTANT

## 2024-04-06 PROCEDURE — 71260 CT THORAX DX C+: CPT

## 2024-04-06 PROCEDURE — C1751 CATH, INF, PER/CENT/MIDLINE: HCPCS

## 2024-04-06 PROCEDURE — 02HV33Z INSERTION OF INFUSION DEVICE INTO SUPERIOR VENA CAVA, PERCUTANEOUS APPROACH: ICD-10-PCS | Performed by: INTERNAL MEDICINE

## 2024-04-06 PROCEDURE — 6370000000 HC RX 637 (ALT 250 FOR IP): Performed by: PHYSICIAN ASSISTANT

## 2024-04-06 PROCEDURE — 85027 COMPLETE CBC AUTOMATED: CPT

## 2024-04-06 PROCEDURE — 36569 INSJ PICC 5 YR+ W/O IMAGING: CPT

## 2024-04-06 PROCEDURE — 85730 THROMBOPLASTIN TIME PARTIAL: CPT

## 2024-04-06 PROCEDURE — 2580000003 HC RX 258: Performed by: NURSE PRACTITIONER

## 2024-04-06 PROCEDURE — 85610 PROTHROMBIN TIME: CPT

## 2024-04-06 PROCEDURE — 6360000004 HC RX CONTRAST MEDICATION: Performed by: INTERNAL MEDICINE

## 2024-04-06 PROCEDURE — 2700000000 HC OXYGEN THERAPY PER DAY

## 2024-04-06 PROCEDURE — 2060000000 HC ICU INTERMEDIATE R&B

## 2024-04-06 PROCEDURE — 85018 HEMOGLOBIN: CPT

## 2024-04-06 PROCEDURE — 71045 X-RAY EXAM CHEST 1 VIEW: CPT

## 2024-04-06 PROCEDURE — 6360000002 HC RX W HCPCS: Performed by: INTERNAL MEDICINE

## 2024-04-06 PROCEDURE — 2580000003 HC RX 258: Performed by: PHYSICIAN ASSISTANT

## 2024-04-06 PROCEDURE — 85014 HEMATOCRIT: CPT

## 2024-04-06 PROCEDURE — 80053 COMPREHEN METABOLIC PANEL: CPT

## 2024-04-06 PROCEDURE — 6360000002 HC RX W HCPCS: Performed by: NURSE PRACTITIONER

## 2024-04-06 PROCEDURE — 85520 HEPARIN ASSAY: CPT

## 2024-04-06 PROCEDURE — 36415 COLL VENOUS BLD VENIPUNCTURE: CPT

## 2024-04-06 PROCEDURE — 99233 SBSQ HOSP IP/OBS HIGH 50: CPT | Performed by: INTERNAL MEDICINE

## 2024-04-06 PROCEDURE — 85025 COMPLETE CBC W/AUTO DIFF WBC: CPT

## 2024-04-06 RX ORDER — ALBUTEROL SULFATE 2.5 MG/3ML
2.5 SOLUTION RESPIRATORY (INHALATION) EVERY 6 HOURS PRN
Status: DISCONTINUED | OUTPATIENT
Start: 2024-04-06 | End: 2024-04-08 | Stop reason: HOSPADM

## 2024-04-06 RX ORDER — ONDANSETRON 2 MG/ML
4 INJECTION INTRAMUSCULAR; INTRAVENOUS EVERY 6 HOURS PRN
Status: DISCONTINUED | OUTPATIENT
Start: 2024-04-06 | End: 2024-04-08 | Stop reason: HOSPADM

## 2024-04-06 RX ORDER — ACETAMINOPHEN 325 MG/1
650 TABLET ORAL EVERY 4 HOURS PRN
Status: DISCONTINUED | OUTPATIENT
Start: 2024-04-06 | End: 2024-04-08 | Stop reason: HOSPADM

## 2024-04-06 RX ORDER — SODIUM CHLORIDE 9 MG/ML
25 INJECTION, SOLUTION INTRAVENOUS PRN
Status: DISCONTINUED | OUTPATIENT
Start: 2024-04-06 | End: 2024-04-08 | Stop reason: HOSPADM

## 2024-04-06 RX ORDER — ASPIRIN 81 MG/1
81 TABLET, CHEWABLE ORAL DAILY
Status: DISCONTINUED | OUTPATIENT
Start: 2024-04-06 | End: 2024-04-08 | Stop reason: HOSPADM

## 2024-04-06 RX ORDER — SODIUM CHLORIDE 0.9 % (FLUSH) 0.9 %
5-40 SYRINGE (ML) INJECTION EVERY 12 HOURS SCHEDULED
Status: DISCONTINUED | OUTPATIENT
Start: 2024-04-06 | End: 2024-04-08 | Stop reason: HOSPADM

## 2024-04-06 RX ORDER — LIDOCAINE HYDROCHLORIDE 10 MG/ML
5 INJECTION, SOLUTION EPIDURAL; INFILTRATION; INTRACAUDAL; PERINEURAL ONCE
Status: DISCONTINUED | OUTPATIENT
Start: 2024-04-06 | End: 2024-04-08 | Stop reason: HOSPADM

## 2024-04-06 RX ORDER — DILTIAZEM HYDROCHLORIDE 5 MG/ML
10 INJECTION INTRAVENOUS ONCE
Status: DISCONTINUED | OUTPATIENT
Start: 2024-04-06 | End: 2024-04-06

## 2024-04-06 RX ORDER — MULTIVITAMIN WITH IRON
1 TABLET ORAL DAILY
Status: DISCONTINUED | OUTPATIENT
Start: 2024-04-06 | End: 2024-04-08 | Stop reason: HOSPADM

## 2024-04-06 RX ORDER — HEPARIN SODIUM 1000 [USP'U]/ML
2000 INJECTION, SOLUTION INTRAVENOUS; SUBCUTANEOUS PRN
Status: DISCONTINUED | OUTPATIENT
Start: 2024-04-06 | End: 2024-04-07

## 2024-04-06 RX ORDER — METOPROLOL TARTRATE 50 MG/1
100 TABLET, FILM COATED ORAL 2 TIMES DAILY
Status: DISCONTINUED | OUTPATIENT
Start: 2024-04-07 | End: 2024-04-08 | Stop reason: HOSPADM

## 2024-04-06 RX ORDER — SODIUM CHLORIDE 0.9 % (FLUSH) 0.9 %
10 SYRINGE (ML) INJECTION PRN
Status: DISCONTINUED | OUTPATIENT
Start: 2024-04-06 | End: 2024-04-08 | Stop reason: HOSPADM

## 2024-04-06 RX ORDER — DIGOXIN 0.25 MG/ML
250 INJECTION INTRAMUSCULAR; INTRAVENOUS EVERY 6 HOURS
Status: DISCONTINUED | OUTPATIENT
Start: 2024-04-06 | End: 2024-04-07

## 2024-04-06 RX ORDER — SODIUM CHLORIDE 9 MG/ML
INJECTION, SOLUTION INTRAVENOUS PRN
Status: DISCONTINUED | OUTPATIENT
Start: 2024-04-06 | End: 2024-04-08 | Stop reason: HOSPADM

## 2024-04-06 RX ORDER — POLYETHYLENE GLYCOL 3350 17 G/17G
17 POWDER, FOR SOLUTION ORAL DAILY
Status: DISCONTINUED | OUTPATIENT
Start: 2024-04-06 | End: 2024-04-08 | Stop reason: HOSPADM

## 2024-04-06 RX ORDER — HEPARIN SODIUM 10000 [USP'U]/100ML
5-30 INJECTION, SOLUTION INTRAVENOUS CONTINUOUS
Status: DISCONTINUED | OUTPATIENT
Start: 2024-04-06 | End: 2024-04-07

## 2024-04-06 RX ORDER — ENOXAPARIN SODIUM 100 MG/ML
40 INJECTION SUBCUTANEOUS DAILY
Status: DISCONTINUED | OUTPATIENT
Start: 2024-04-06 | End: 2024-04-06

## 2024-04-06 RX ORDER — METOPROLOL SUCCINATE 50 MG/1
100 TABLET, EXTENDED RELEASE ORAL DAILY
Status: DISCONTINUED | OUTPATIENT
Start: 2024-04-06 | End: 2024-04-06

## 2024-04-06 RX ORDER — OXYCODONE HCL 5 MG/5 ML
5 SOLUTION, ORAL ORAL EVERY 4 HOURS PRN
Status: DISCONTINUED | OUTPATIENT
Start: 2024-04-06 | End: 2024-04-08 | Stop reason: HOSPADM

## 2024-04-06 RX ORDER — HEPARIN SODIUM 1000 [USP'U]/ML
4000 INJECTION, SOLUTION INTRAVENOUS; SUBCUTANEOUS ONCE
Status: COMPLETED | OUTPATIENT
Start: 2024-04-06 | End: 2024-04-06

## 2024-04-06 RX ORDER — SODIUM CHLORIDE 0.9 % (FLUSH) 0.9 %
5-40 SYRINGE (ML) INJECTION PRN
Status: DISCONTINUED | OUTPATIENT
Start: 2024-04-06 | End: 2024-04-08 | Stop reason: HOSPADM

## 2024-04-06 RX ORDER — BISACODYL 10 MG
10 SUPPOSITORY, RECTAL RECTAL DAILY PRN
Status: DISCONTINUED | OUTPATIENT
Start: 2024-04-06 | End: 2024-04-08 | Stop reason: HOSPADM

## 2024-04-06 RX ORDER — SODIUM CHLORIDE 0.9 % (FLUSH) 0.9 %
10 SYRINGE (ML) INJECTION EVERY 12 HOURS SCHEDULED
Status: DISCONTINUED | OUTPATIENT
Start: 2024-04-06 | End: 2024-04-08 | Stop reason: HOSPADM

## 2024-04-06 RX ORDER — HEPARIN SODIUM 1000 [USP'U]/ML
4000 INJECTION, SOLUTION INTRAVENOUS; SUBCUTANEOUS PRN
Status: DISCONTINUED | OUTPATIENT
Start: 2024-04-06 | End: 2024-04-07

## 2024-04-06 RX ORDER — VITAMIN E 268 MG
400 CAPSULE ORAL DAILY
Status: DISCONTINUED | OUTPATIENT
Start: 2024-04-06 | End: 2024-04-08 | Stop reason: HOSPADM

## 2024-04-06 RX ORDER — PRAVASTATIN SODIUM 40 MG
40 TABLET ORAL NIGHTLY
Status: DISCONTINUED | OUTPATIENT
Start: 2024-04-06 | End: 2024-04-08 | Stop reason: HOSPADM

## 2024-04-06 RX ORDER — BISACODYL 5 MG/1
5 TABLET, DELAYED RELEASE ORAL DAILY PRN
Status: DISCONTINUED | OUTPATIENT
Start: 2024-04-06 | End: 2024-04-08 | Stop reason: HOSPADM

## 2024-04-06 RX ADMIN — PRAVASTATIN SODIUM 40 MG: 40 TABLET ORAL at 21:38

## 2024-04-06 RX ADMIN — IOPAMIDOL 75 ML: 755 INJECTION, SOLUTION INTRAVENOUS at 12:57

## 2024-04-06 RX ADMIN — POLYETHYLENE GLYCOL 3350 17 G: 17 POWDER, FOR SOLUTION ORAL at 08:12

## 2024-04-06 RX ADMIN — AMIODARONE HYDROCHLORIDE 1 MG/MIN: 50 INJECTION, SOLUTION INTRAVENOUS at 02:17

## 2024-04-06 RX ADMIN — HEPARIN SODIUM 12 UNITS/KG/HR: 10000 INJECTION, SOLUTION INTRAVENOUS at 11:29

## 2024-04-06 RX ADMIN — ASPIRIN 81 MG 81 MG: 81 TABLET ORAL at 08:12

## 2024-04-06 RX ADMIN — DIGOXIN 250 MCG: 250 INJECTION, SOLUTION INTRAMUSCULAR; INTRAVENOUS; PARENTERAL at 11:19

## 2024-04-06 RX ADMIN — AMIODARONE HYDROCHLORIDE 0.5 MG/MIN: 50 INJECTION, SOLUTION INTRAVENOUS at 11:35

## 2024-04-06 RX ADMIN — HEPARIN SODIUM 4000 UNITS: 1000 INJECTION INTRAVENOUS; SUBCUTANEOUS at 11:28

## 2024-04-06 RX ADMIN — DIGOXIN 250 MCG: 250 INJECTION, SOLUTION INTRAMUSCULAR; INTRAVENOUS; PARENTERAL at 17:33

## 2024-04-06 RX ADMIN — METOPROLOL SUCCINATE 100 MG: 50 TABLET, EXTENDED RELEASE ORAL at 08:12

## 2024-04-06 RX ADMIN — ENOXAPARIN SODIUM 40 MG: 100 INJECTION SUBCUTANEOUS at 08:12

## 2024-04-06 RX ADMIN — AMIODARONE HYDROCHLORIDE 0.5 MG/MIN: 50 INJECTION, SOLUTION INTRAVENOUS at 08:15

## 2024-04-06 RX ADMIN — SODIUM CHLORIDE, PRESERVATIVE FREE 10 ML: 5 INJECTION INTRAVENOUS at 21:27

## 2024-04-06 RX ADMIN — THERA TABS 1 TABLET: TAB at 08:12

## 2024-04-06 RX ADMIN — Medication 400 UNITS: at 08:12

## 2024-04-06 RX ADMIN — SODIUM CHLORIDE, PRESERVATIVE FREE 10 ML: 5 INJECTION INTRAVENOUS at 08:12

## 2024-04-06 RX ADMIN — DIGOXIN 250 MCG: 250 INJECTION, SOLUTION INTRAMUSCULAR; INTRAVENOUS; PARENTERAL at 23:53

## 2024-04-06 NOTE — PROGRESS NOTES
04/06/24 0232   RT Protocol   History Pulmonary Disease 0   Respiratory pattern 0   Breath sounds 2   Cough 0   Indications for Bronchodilator Therapy Decreased or absent breath sounds   Bronchodilator Assessment Score 2

## 2024-04-06 NOTE — RT PROTOCOL NOTE
RT Inhaler-Nebulizer Bronchodilator Protocol Note    There is a bronchodilator order in the chart from a provider indicating to follow the RT Bronchodilator Protocol and there is an “Initiate RT Inhaler-Nebulizer Bronchodilator Protocol” order as well (see protocol at bottom of note).    CXR Findings:  No results found.    The findings from the last RT Protocol Assessment were as follows:   History Pulmonary Disease: None or smoker <15 pack years  Respiratory Pattern: Regular pattern and RR 12-20 bpm  Breath Sounds: Slightly diminished and/or crackles  Cough: Strong, spontaneous, non-productive  Indication for Bronchodilator Therapy: None  Bronchodilator Assessment Score: 2    Aerosolized bronchodilator medication orders have been revised according to the RT Inhaler-Nebulizer Bronchodilator Protocol below.    Respiratory Therapist to perform RT Therapy Protocol Assessment initially then follow the protocol.  Repeat RT Therapy Protocol Assessment PRN for score 0-3 or on second treatment, BID, and PRN for scores above 3.    No Indications - adjust the frequency to every 6 hours PRN wheezing or bronchospasm, if no treatments needed after 48 hours then discontinue using Per Protocol order mode.     If indication present, adjust the RT bronchodilator orders based on the Bronchodilator Assessment Score as indicated below.  Use Inhaler orders unless patient has one or more of the following: on home nebulizer, not able to hold breath for 10 seconds, is not alert and oriented, cannot activate and use MDI correctly, or respiratory rate 25 breaths per minute or more, then use the equivalent nebulizer order(s) with same Frequency and PRN reasons based on the score.  If a patient is on this medication at home then do not decrease Frequency below that used at home.    0-3 - enter or revise RT bronchodilator order(s) to equivalent RT Bronchodilator order with Frequency of every 4 hours PRN for wheezing or increased work of

## 2024-04-06 NOTE — ASSESSMENT & PLAN NOTE
RCA with L to R collaterals on cath 2012 [facility]  Continue medical management  Recent Echo preserved LVEF

## 2024-04-06 NOTE — PROGRESS NOTES
Rhythm change noted on tele monitor. 12 lead EKG obtained. Appears sinus rhythm on tele monitor with HR 80 BPM.

## 2024-04-06 NOTE — PROGRESS NOTES
ARU Discharge Assessment    Transportation  \"Has lack of transportation kept you from medical appointments, meetings, work, or from getting things needed for daily living?\"Check all that apply:  [] A.  Yes, it has kept me from medical appointments or from getting my medications  [] B.  Yes, it has kept me from non-medical meetings, appointments, work, or from getting things that I need  [x] C.  No  [] X.  Patient unable to respond  [] Y.  Patient declines to respond    Provision of Current Reconciled Medication List to Subsequent Provider at Discharge  [] No, current reconciled medication list not provided to the subsequent provider.  [x] Yes, current reconciled medication list provided to the subsequent provider. (**Select route of transmission below**)   [] Via Electronic Health Record   [] Via Health Information Exchange Organization  [] Verbal (e.g. in person, telephone, video conferencing)  [x] Paper-based (e.g. fax, copies, printouts)   [] Other Methods (e.g. texting, email, CDs)    Provision of Current Reconciled Medication List to Patient at Discharge  [] No, current reconciled medication list not provided to the patient, family and/or caregiver.   [x] Yes, current reconciled medication list provided to the patient, family and/or caregiver.  (**Select route of transmission below**)   [] Via Electronic Health Record (e.g., electronic access to patient portal)   [] Via Health Information Exchange Organization  [] Verbal (e.g. in person, telephone, video conferencing)  [x] Paper-based (e.g. fax, copies, printouts)   [] Other Methods (e.g. texting, email, CDs)    Health Literacy  \"How often do you need to have someone help you when you read instructions, pamphlets, or other written material from your doctor or pharmacy?\"  []  0.  Never  [x]  1.  Rarely  []  2.  Sometimes  []  3.  Often  []  4.  Always  []  7.  Patient declines to respond  []  8.  Patient unable to respond    BIMS - **Must be completed in the  = 12-14 days (nearly every day)  **Leave blank if 'No Reponse'**      Enter scores in boxes    Column 1 Column 2   Little interest or pleasure in doing things   0 0   Feeling down, depressed, or hopeless   0 0   **If either A or B in column 2 is coded “2” or “3”, CONTINUE asking the questions below.  If not, END the interview.**     Trouble falling or staying asleep, or sleeping too much       Feeling tired or having little energy       Poor appetite or overeating       Feeling bad about yourself - or that you are a failure or have let yourself or your family down       Trouble concentrating on things, such as reading the newspaper or watching television       Moving or speaking so slowly that other people could have noticed.  Or the opposite- being so fidgety or restless that you have been moving around a lot more than usual.       Thoughts that you would be better off dead, or of hurting yourself in some way.       Total Severity: Add scores for all frequency responses in column 2 (possible score 0-27, or enter 99 if unable to complete (if symptom frequency (column 2) is blank for 3 or more items).        Social Isolation  \"How often do you feel lonely or isolated from those around you?\"  [x] 0.  Never  [] 1.  Rarely  [] 2.  Sometimes  [] 3.  Often  [] 4.  Always  [] 7.  Patient declines to respond  [] 8.  Patient unable to respond    Pain Effect on Sleep  \"Over the past 5 days, how much of the time has pain made it hard for you to sleep at night?\"  []  0.  Does not apply - I have not had any pain or hurting in the past 5 days  [x]  1.  Rarely or not at all  []  2.  Occasionally  []  3.  Frequently  []  4.  Almost constantly  []  8.  Unable to answer    **If the patient answers \"0.  Does not apply\" to this question, skip the next two \"Pain Effect...\" questions**    Pain Interference with Therapy Activities  \"Over the past 5 days, how often have you limited your participation in rehabilitation therapy sessions due

## 2024-04-06 NOTE — PROGRESS NOTES
Arrived to place PICC line with bedside RN Max. Pre-procedure and timeout done with RN, discussed limitations of placement and allergies. Consent confirmed. Vital signs stable. Labs, allergies, medications, and code status reviewed. No contraindications noted.    Procedure explained to pt, including the risk and benefits of the procedure. All questions answered. Pt verbalizes understanding of the procedure and states no more questions.     Pt's basilic, brachial, cephalic are all easily collapsible with no indication for a clot. Vein selected is large enough for catheter. Pt tolerated sterile procedure well, with no difficulty accessing right basilic vein, when accessed - blood was free flowing and non-pulsatile. Guidewire, introducer, and catheter went in smoothly.     PICC line being verified with XRAY, please do not use PICC until the impression comes back with the tip within the SVC or Cavo atrial junction. Once placement is confirmed receive orders from MD to use PICC.     If PICC is not within SVC please call [facility] and  will notify the PICC RN that is on call.    Nurses, when PICC is verified:  Please replace all existing IV tubing with new IV tubing prior to using the PICC for current IV infusions.  Please remove any PIVs from PICC arm.  All of the above may be sources of infection or an increase chance of a clot.      Post procedure - reorganized pt table, placed pt in lowest position, with call light and educated on line care. Instructed pt/RN not to use arm for at least 30min to avoid bleeding. Reported off to bedside RN.        [phone]

## 2024-04-06 NOTE — ASSESSMENT & PLAN NOTE
Remote history of presumed GIB in 2018; ECG/Cscope negative at the time  She was not restarted on OAC due to that event  Currently no active bleeding, Hgb stable  Benefits of therapeutic heparin anticoagulation while hospitalized outweigh benefits  Continue to monitor

## 2024-04-06 NOTE — PROGRESS NOTES
@ this time RN notifies family ( Jericho Renner ) about pt discharge to  for further monitoring.

## 2024-04-06 NOTE — RT PROTOCOL NOTE
RT Nebulizer Bronchodilator Protocol Note    There is a bronchodilator order in the chart from a provider indicating to follow the RT Bronchodilator Protocol and there is an “Initiate RT Bronchodilator Protocol” order as well (see protocol at bottom of note).    CXR Findings:  No results found.    The findings from the last RT Protocol Assessment were as follows:  Smoking: None or smoker <15 pack years  Respiratory Pattern: Regular pattern and RR 12-20 bpm  Breath Sounds: Slightly diminished and/or crackles  Cough: Strong, spontaneous, non-productive  Indication for Bronchodilator Therapy: Decreased or absent breath sounds  Bronchodilator Assessment Score: 2    Aerosolized bronchodilator medication orders have been revised according to the RT Nebulizer Bronchodilator Protocol below.    Respiratory Therapist to perform RT Therapy Protocol Assessment initially then follow the protocol.  Repeat RT Therapy Protocol Assessment PRN for score 0-3 or on second treatment, BID, and PRN for scores above 3.    No Indications - adjust the frequency to every 6 hours PRN wheezing or bronchospasm, if no treatments needed after 48 hours then discontinue using Per Protocol order mode.     If indication present, adjust the RT bronchodilator orders based on the Bronchodilator Assessment Score as indicated below.  If a patient is on this medication at home then do not decrease Frequency below that used at home.    0-3 - enter or revise RT bronchodilator order(s) to equivalent RT Bronchodilator order with Frequency of every 4 hours PRN for wheezing or increased work of breathing using Per Protocol order mode.       4-6 - enter or revise RT Bronchodilator order(s) to two equivalent RT bronchodilator orders with one order with BID Frequency and one order with Frequency of every 4 hours PRN wheezing or increased work of breathing using Per Protocol order mode.         7-10 - enter or revise RT Bronchodilator order(s) to two equivalent RT  bronchodilator orders with one order with TID Frequency and one order with Frequency of every 4 hours PRN wheezing or increased work of breathing using Per Protocol order mode.       11-13 - enter or revise RT Bronchodilator order(s) to one equivalent RT bronchodilator order with QID Frequency and an Albuterol order with Frequency of every 4 hours PRN wheezing or increased work of breathing using Per Protocol order mode.      Greater than 13 - enter or revise RT Bronchodilator order(s) to one equivalent RT bronchodilator order with every 4 hours Frequency and an Albuterol order with Frequency of every 2 hours PRN wheezing or increased work of breathing using Per Protocol order mode.     RT to enter RT Home Evaluation for COPD & MDI Assessment order using Per Protocol order mode.    Electronically signed by MICHELLE REDD RCP on 4/6/2024 at 2:33 AM

## 2024-04-06 NOTE — PROGRESS NOTES
Patient's HR consistently between 126-160 BPM at rest. Amio gtt per MAR. Metoprolol PO given this AM per MAR with no improvement in HR. Call placed to on call Cardiology to notify of HR.

## 2024-04-06 NOTE — ASSESSMENT & PLAN NOTE
Elevated troponin is flat and adynamic indicative of Type II demand ischemia in setting of AF RVR  Continue low dose aspirin  CT Chest PE ordered

## 2024-04-06 NOTE — PROGRESS NOTES
Nutrition Note    RECOMMENDATIONS  PO Diet: Regular  ONS: Ensure BID     ASSESSMENT   Nutrition intervention triggered for ONS. Pt has increased nutrient needs d/t recent hip surgery.  WT has been stable.  Po intake 26-75% of meals thus far.  Pt agrees to try Ensure until appetite consistently greater than 50% of meals.  No further needs at this time.       Malnutrition Status: No malnutrition  Acute Illness    NUTRITION DIAGNOSIS   Increased nutrient needs related to increase demand for energy/nutrients as evidenced by wounds (surgical)    Goals: PO intake 50% or greater     NUTRITION RELATED FINDINGS  Objective: nonpitting RLE edema; Na 134  Wounds: Surgical Incision    CURRENT NUTRITION THERAPIES  ADULT DIET; Regular     PO Intake: 26-50%, 51-75%   PO Supplement Intake:None Ordered    ANTHROPOMETRICS  Current Height: 165.1 cm (5' 5\")  Current Weight - Scale: 74.5 kg (164 lb 4.8 oz)    Ideal Body Weight (IBW): 125 lbs  (57 kg)      BMI: 27.3      COMPARATIVE STANDARDS      kcal: 1196- 1555  Protein (g):  74- 114g       Fluid (mL/day):  1 ml/kcal    EDUCATION  Education not indicated     The patient will be monitored per nutrition standards of care. Consult dietitian if additional nutrition interventions are needed prior to RD reassessment.     Aurora Gotti, KELSEY, LD    Contact: [phone]

## 2024-04-06 NOTE — PLAN OF CARE
Problem: Safety - Adult  Goal: Free from fall injury  4/6/2024 1003 by Flakita Gupta RN  Outcome: Progressing     Problem: Discharge Planning  Goal: Discharge to home or other facility with appropriate resources  4/6/2024 1003 by Flakita Gupta RN  Outcome: Progressing     Problem: ABCDS Injury Assessment  Goal: Absence of physical injury  4/6/2024 1003 by Flakita Gupta RN  Outcome: Progressing     Problem: Skin/Tissue Integrity  Goal: Absence of new skin breakdown  Description: 1.  Monitor for areas of redness and/or skin breakdown  2.  Assess vascular access sites hourly  3.  Every 4-6 hours minimum:  Change oxygen saturation probe site  4.  Every 4-6 hours:  If on nasal continuous positive airway pressure, respiratory therapy assess nares and determine need for appliance change or resting period.  4/6/2024 1003 by Flakita Gupta RN  Outcome: Progressing

## 2024-04-06 NOTE — ASSESSMENT & PLAN NOTE
Known PAF, now in AF RVR, may be precipitated by recent surgical stress  Consider acute PE in the Ddx due to recent orthopedic surgery, immobilization, sob and elevated troponin  Recommend CT PE rule out  Start therapeutic heparin anticoagulation  Currently on amiodarone gtt, received PO Toprol with soft blood pressure  Recommend Digoxin load with careful watch on renal function, electrolytes  Recent echo reviewed

## 2024-04-06 NOTE — H&P
abnormality.     CT brain: No acute intracranial hemorrhage or mass effect. No calvarial fracture. CT cervical spine: No acute vertebral fracture or traumatic subluxation.  Electronically signed by Tirso Luo MD    XR CHEST PORTABLE    Result Date: 4/1/2024  EXAM: XR CHEST PORTABLE INDICATION: 83 years Female; \"hip fx\" COMPARISON: None FINDINGS: Enlarged cardiac silhouette. No consolidation, pleural effusion, or pneumothorax. No acute osseous abnormality.     Mildly enlarged cardiac silhouette. Electronically signed by Tirso Luo MD    XR FEMUR RIGHT (MIN 2 VIEWS)    Result Date: 4/1/2024  Exam: XR FEMUR RIGHT (MIN 2 VIEWS) History: 83 years Female; \"fall hip and thigh pain\". Comparison: None available Findings: Comminuted displaced intertrochanteric right femur fracture. No dislocation. No osseous erosion. No significant soft tissue abnormality. Proximal tibial fixation hardware.     Impression: Displaced comminuted intertrochanteric right femur fracture. Electronically signed by Tirso Luo MD        (Please note that portions of this note were completed with a voice recognition program.  Efforts were made to edit the dictations but occasionally words are mis-transcribed.)     Nadira Culver PA-C

## 2024-04-06 NOTE — H&P
Patient: Charline Renner  [id number]  Date: 4/6/2024      Chief Complaint: R intertrochanteric femur fracture    History of Present Illness/Hospital Course:  Charline Renner is a 83 y.o. female with PMHx notable for Afib (not on anticoagulation),  macular degeneration (legally blind) who was admitted to [facility] on 3/1 after mechanical fall with right hip fracture. Ortho was consulted, and she underwent IM nail placement on 4/3. Course complicated by Afib w/ RVR. Cardiology had recommend consideration of Watchman device.     Patient is feeling well. Denies any right hip pain at rest, but reports pain with movement and weight bearing.     Prior Level of Function:  Independent with mobility, self care, ADLs.     Current Level of Function:  PT  Functional Mobility  Bed Mobility: sit >supine Max of 2 with assist for LE and trunk and for recline in chair total assist.   Comments: Dizzy when LE lowered and needed to re-recline to recover.   Transfers:  Steady assist max of 2 from chair to bed with mod of 2 from stedy seat to stand. Needed assist to place LE and UE appropriately on stedy.   Unable due to sit<>stand with Walker  weakness and dizziness in the presence of lower BP   Comments:   Ambulation on Level surfaces :             Assistance required not attempted  Assistive Device rolling walker  Other Appliance supplemental O2  Distance in feet   Gait Mechanics: Comments:         OT  ACTIVITIES OF DAILY LIVING  Feeding: set up assistance  - comments:   Grooming: not attempted  - comments:   Bathing: not attempted  - comments:   UB Dressing: not attempted  - comments:   LB Dressing: maximum assistance  - comments:   Toileting: maximum assistance  - comments:     Functional Deficits:  Decreased right hip ROM, right lower extremity weakness/pain limitation, impaired balance, decreased functional endurance, gait instability limiting independence with mobility and self care.      has a past medical history of A-fib  (HCC), Hyperlipidemia, Hypertension, and Macular degeneration.     has a past surgical history that includes hip surgery (Right, 4/3/2024).     reports that she has never smoked. She has never used smokeless tobacco.    family history is not on file.    Allergies: Pcn [penicillins]    No current facility-administered medications for this encounter.     No current outpatient medications on file.     Facility-Administered Medications Ordered in Other Encounters   Medication Dose Route Frequency Provider Last Rate Last Admin    aspirin chewable tablet 81 mg  81 mg Oral Daily Nadira Culver PA-C        oxyCODONE (ROXICODONE) 5 MG/5ML solution 5 mg  5 mg Oral Q4H PRN Nadira Culver PA-C        metoprolol succinate (TOPROL XL) extended release tablet 100 mg  100 mg Oral Daily Nadira Culver PA-C        pravastatin (PRAVACHOL) tablet 40 mg  40 mg Oral Nightly Nadira Culver PA-C        acetaminophen (TYLENOL) tablet 650 mg  650 mg Oral Q4H PRN Nadira Culver PA-C        enoxaparin (LOVENOX) injection 40 mg  40 mg SubCUTAneous Daily Nadira Culver PA-C        polyethylene glycol (GLYCOLAX) packet 17 g  17 g Oral Daily Nadira Culver PA-C        bisacodyl (DULCOLAX) EC tablet 5 mg  5 mg Oral Daily PRN Nadira Culver PA-C        bisacodyl (DULCOLAX) suppository 10 mg  10 mg Rectal Daily PRN Nadira Culver PA-C        vitamin E capsule 400 Units  400 Units Oral Daily Nadira Culver PA-C        multivitamin 1 tablet  1 tablet Oral Daily Nadira Culver PA-C        albuterol (PROVENTIL) (2.5 MG/3ML) 0.083% nebulizer solution 2.5 mg  2.5 mg Nebulization Q6H PRN Nadira Culver PA-C        sodium chloride flush 0.9 % injection 10 mL  10 mL IntraVENous 2 times per day Nadira Culver PA-C        sodium chloride flush 0.9 % injection 10 mL  10 mL IntraVENous PRN Nadira Culver PA-C        0.9 % sodium chloride infusion   IntraVENous PRN Nadira Culver PA-C      04/06/2024    AST 31 04/06/2024    ALKPHOS 64 04/06/2024    BILITOT 1.6 (H) 04/06/2024       Most recent imaging studies revealed   XR R Femur 4/1  IMPRESSION:  Impression:   Displaced comminuted intertrochanteric right femur fracture.    The above laboratory data have been reviewed.     The above imaging data have been reviewed.     The above medical testing have been reviewed.     Body mass index is 27.26 kg/m².    Barriers to Discharge (AKA Impairments): Decreased right hip ROM, right lower extremity weakness/pain limitation, impaired balance, decreased functional endurance, gait instability limiting independence with mobility and self care.     Disposition: Home    Prognosis: Fair    Rehabilitation goals: To return patient to home setting at their prior level of function.     This patient's IRF admission is reasonable and necessary to optimize their medical status, maximize their functional improvement, and ensure a maximally safe discharge.    POST ADMISSION PHYSICIAN EVALUATION  The patient has agreed to being admitted to our comprehensive inpatient  rehabilitation facility consisting of at least 180 minutes of therapy a day,  5 out of 7 days a week.    The patient/family has a good understanding of our discharge process. The  patient has potential to make improvement and is in need of at least two of  the following multidisciplinary therapies including but not limited to  physical, occupational, respiratory, and speech, nutritional services, wound care, and prosthetics and orthotics. Given the patients complex condition  and risk of further medical complications, rehabilitation services cannot be  safely provided at a lower level of care such as a skilled nursing facility.  I have compared the patients medical and functional status at the time of the  preadmission screening and the same on this date, and there are no significant changes except as documented below in the assessment and plan.    By signing this

## 2024-04-06 NOTE — DISCHARGE SUMMARY
Physical Medicine & Rehabilitation  Discharge Summary     Patient Identification:  Charline Renner  : 1940  Admit date: 2024  Discharge date: 2024  Attending provider: No att. providers found        Primary care provider: Denny Jensen     Discharge Diagnoses:   Patient Active Problem List   Diagnosis    Intertrochanteric fracture of right femur, closed, initial encounter ([facility])    Atrial fibrillation with RVR ([facility])    Closed fracture of femur, intertrochanteric, right, sequela       Discharge Functional Status:      Physical therapy:  Supine to Sit:    Sit to Supine:        Sit to Stand:    Chair/Bed to Chair Transfer:    Car Transfer:       Ambulation 10 ft:    Ambulation 50 ft:    Ambulation 150 ft:    Stairs - 1 Step:    Stairs - 4 Step:    Stairs - 12 Step:      Occupational therapy:  Eating:    Oral Hygiene:    Bathing:    Upper Body Dressing:    Lower Body Dressing:       Toilet Transfer:    Toilet Hygiene:      Speech therapy:         History of Present Illness/Acute Hospital Course:  Charline Renner is a 83 y.o. female with PMHx notable for Afib (not on anticoagulation),  macular degeneration (legally blind) who was admitted to [facility] on 3/1 after mechanical fall with right hip fracture. Ortho was consulted, and she underwent IM nail placement on 4/3. Course complicated by Afib w/ RVR. Cardiology had recommend consideration of Watchman device.     Inpatient Rehabilitation Course:   Charline Renner is a 83 y.o. female admitted to inpatient rehabilitation on 2024 s/p Closed fracture of femur, intertrochanteric, right, sequela.  The patient participated in an aggressive multidisciplinary inpatient rehabilitation program involving 3 hours of therapy per day, at least 5 days per week.     Patient arrived to ARU, noted to be tachycardic during the evening. EKG was obtained and noted patient to be in Afib w/ RVR. Hospital medicine was consulted and recommended admission to their service for  as: TYLENOL  Take 2 tablets by mouth every 6 hours for 7 days     aspirin 81 MG chewable tablet  Take 1 tablet by mouth daily     bisacodyl 5 MG EC tablet  Commonly known as: DULCOLAX  Take 1 tablet by mouth daily as needed for Constipation     fondaparinux 2.5 MG/0.5ML Soln  Commonly known as: ARIXTRA  Inject 0.5 mLs into the skin daily for 21 days     metoprolol succinate 100 MG extended release tablet  Commonly known as: TOPROL XL     oxyCODONE 5 MG immediate release tablet  Commonly known as: Roxicodone  Take 1 tablet by mouth every 6 hours as needed for Pain for up to 3 days. Intended supply: 3 days. Take lowest dose possible to manage pain Max Daily Amount: 20 mg     polyethylene glycol 17 g packet  Commonly known as: GLYCOLAX  Take 1 packet by mouth daily     pravastatin 40 MG tablet  Commonly known as: PRAVACHOL     Thera M Plus Tabs     vitamin D 25 MCG (1000 UT) Tabs tablet  Commonly known as: CHOLECALCIFEROL              I spent over 35 minutes on this discharge encounter between counseling, coordination of care, and medication reconciliation.      To comply with [facility] bylaw R.II.4.1:  Discharge order placed in advance to facilitate patient's discharge needs.    MD Jairo Rodriguez MD 4/6/2024, 7:44 AM    * This document was created using dictation software.  While all precautions were taken to ensure accuracy, errors may have occurred.  Please disregard any typographical errors.

## 2024-04-06 NOTE — PROGRESS NOTES
@ this time, this RN checks pt vital signs per flow sheet and notes pulse rate is 118. Rechecked and in 124. Pt denies chest pains or palpitations. MD on call in ARU consulted and ordered for stat EKG. EKG shows pt is in Afib . Hospitalist on call notified. Comes up to assess patient and admit pt to the acute care side.

## 2024-04-06 NOTE — PROGRESS NOTES
\"WBCUA\", \"BACTERIA\", \"RBCUA\", \"BLOODU\", \"SPECGRAV\", \"GLUCOSEU\"    Radiology:  XR CHEST PORTABLE   Final Result   Interval placement of PICC line.  No complication.      No other significant interval change.         CT CHEST PULMONARY EMBOLISM W CONTRAST    (Results Pending)       Assessment/Plan:    Active Hospital Problems    Diagnosis Date Noted    History of gastrointestinal bleeding [Z87.19] 04/06/2024    Coronary artery disease involving native coronary artery of native heart without angina pectoris [I25.10] 04/06/2024    Elevated troponin [R79.89] 04/06/2024    Atrial fibrillation with RVR ([facility]) [I48.91] 04/02/2024       Acute Medical Issues Being Addressed:  83-year-old recent intertrochanteric nailing for femur fracture transferred from ARU due to uncontrolled heart rate    Atrial fibrillation with rapid ventricular rate  CTPA ordered per cardiology  On heparin drip  On amnio drip heart rate still uncontrolled  Received Toprol with soft blood pressures  Digoxin load with monitoring renal functions  Recent echo done    History of gastrointestinal bleeding  Remote history of presumed GIB in 2018; ECG/Cscope negative at the time  She was not restarted on OAC due to that event  Currently no active bleeding, Hgb stable  Benefits of therapeutic heparin anticoagulation while hospitalized outweigh benefits  Continue to monitor     Coronary artery disease involving native coronary artery of native heart without angina pectoris   RCA with L to R collaterals on cath 2012 [facility]  Continue medical management  Recent Echo preserved LVEF     Elevated troponin  Elevated troponin is flat and adynamic indicative of Type II demand ischemia in setting of AF RVR  Continue low dose aspirin  CT Chest PE ordered      Right intertrochanteric femur fracture  S/p right hip IM nailing  Weightbearing as tolerated    DVT Prophylaxis: On IV heparin  Diet: ADULT DIET; Regular  ADULT ORAL NUTRITION SUPPLEMENT; Breakfast, Dinner;  Standard High Calorie/High Protein Oral Supplement  Code Status: Full Code      Dispo - once acute medical processes have resolved    Ny Fine MD

## 2024-04-06 NOTE — CONSULTS
Medium: Definity. Amount - 1.5 ml    Height: 65 inches Weight: 161 pounds BSA: 1.8 m2 BMI: 26.79 kg/m2    Rhythm: Within normal limits HR: 59 bpm BP: 124/50 mmHg     Conclusions      Summary   Left ventricular cavity size is normal with mild concentric left ventricular   hypertrophy.   Global left ventricular function is normal with ejection fraction estimated   from 55 % to 60 %. No regional wall motion abnormalities are seen.   Grade I diastolic dysfunction with normal LV filling pressures.   The left atrium is moderately dilated.   The right ventricle is moderately enlarged.   Right ventricular systolic function is normal .   Moderate to severe tricuspid regurgitation.   The right atrium is severely dilated.   Estimated pulmonary artery systolic pressure is elevated at 44 mmHg.      Signature      ------------------------------------------------------------------   Electronically signed by Willy Schroeder MD, [facility], RPVI   (Interpreting physician) on 04/02/2024 at 10:42 AM   ------------------------------------------------------------------      Findings      Left Ventricle   Left ventricular cavity size is normal with mild concentric left ventricular   hypertrophy.   Global left ventricular function is normal with ejection fraction estimated   from 55 % to 60 %.   No regional wall motion abnormalities are seen.   Grade I diastolic dysfunction with normal LV filling pressures.      Mitral Valve   The mitral valve is mildly calcified with annular calcification. No evidence   of mitral regurgitation or stenosis.      Left Atrium   The left atrium is moderately dilated.      Aortic Valve   The aortic valve is mildly calcified with reduced cusp separation. There is   no significant aortic valve regurgitation or stenosis.      Aorta   The aortic root is normal in size.   The ascending aorta appears on the upper limits of normal.      Right Ventricle   The right ventricle is moderately enlarged.   Right ventricular  DO KATELIN [facility] 4/6/2024 11:07 AM

## 2024-04-07 LAB
ANION GAP SERPL CALCULATED.3IONS-SCNC: 5 MMOL/L (ref 3–16)
ANTI-XA UNFRAC HEPARIN: 0.33 IU/ML (ref 0.3–0.7)
BASOPHILS # BLD: 0.1 K/UL (ref 0–0.2)
BASOPHILS NFR BLD: 0.7 %
BUN SERPL-MCNC: 25 MG/DL (ref 7–20)
CALCIUM SERPL-MCNC: 7.9 MG/DL (ref 8.3–10.6)
CHLORIDE SERPL-SCNC: 101 MMOL/L (ref 99–110)
CO2 SERPL-SCNC: 29 MMOL/L (ref 21–32)
CREAT SERPL-MCNC: <0.5 MG/DL (ref 0.6–1.2)
DEPRECATED RDW RBC AUTO: 12.8 % (ref 12.4–15.4)
EOSINOPHIL # BLD: 0.4 K/UL (ref 0–0.6)
EOSINOPHIL NFR BLD: 4.4 %
GFR SERPLBLD CREATININE-BSD FMLA CKD-EPI: >90 ML/MIN/{1.73_M2}
GLUCOSE SERPL-MCNC: 114 MG/DL (ref 70–99)
HCT VFR BLD AUTO: 27.1 % (ref 36–48)
HGB BLD-MCNC: 9.3 G/DL (ref 12–16)
LYMPHOCYTES # BLD: 2.1 K/UL (ref 1–5.1)
LYMPHOCYTES NFR BLD: 22.4 %
MCH RBC QN AUTO: 32.9 PG (ref 26–34)
MCHC RBC AUTO-ENTMCNC: 34.4 G/DL (ref 31–36)
MCV RBC AUTO: 95.8 FL (ref 80–100)
MONOCYTES # BLD: 1 K/UL (ref 0–1.3)
MONOCYTES NFR BLD: 10.7 %
NEUTROPHILS # BLD: 5.9 K/UL (ref 1.7–7.7)
NEUTROPHILS NFR BLD: 61.8 %
PLATELET # BLD AUTO: 166 K/UL (ref 135–450)
PMV BLD AUTO: 9.6 FL (ref 5–10.5)
POTASSIUM SERPL-SCNC: 4.4 MMOL/L (ref 3.5–5.1)
RBC # BLD AUTO: 2.83 M/UL (ref 4–5.2)
SODIUM SERPL-SCNC: 135 MMOL/L (ref 136–145)
TSH SERPL DL<=0.005 MIU/L-ACNC: 3.73 UIU/ML (ref 0.27–4.2)
WBC # BLD AUTO: 9.5 K/UL (ref 4–11)

## 2024-04-07 PROCEDURE — 6370000000 HC RX 637 (ALT 250 FOR IP): Performed by: INTERNAL MEDICINE

## 2024-04-07 PROCEDURE — 2060000000 HC ICU INTERMEDIATE R&B

## 2024-04-07 PROCEDURE — 99233 SBSQ HOSP IP/OBS HIGH 50: CPT | Performed by: INTERNAL MEDICINE

## 2024-04-07 PROCEDURE — 2580000003 HC RX 258: Performed by: PHYSICIAN ASSISTANT

## 2024-04-07 PROCEDURE — 84443 ASSAY THYROID STIM HORMONE: CPT

## 2024-04-07 PROCEDURE — 97166 OT EVAL MOD COMPLEX 45 MIN: CPT

## 2024-04-07 PROCEDURE — 6370000000 HC RX 637 (ALT 250 FOR IP): Performed by: PHYSICIAN ASSISTANT

## 2024-04-07 PROCEDURE — 97161 PT EVAL LOW COMPLEX 20 MIN: CPT

## 2024-04-07 PROCEDURE — 97530 THERAPEUTIC ACTIVITIES: CPT

## 2024-04-07 PROCEDURE — 6360000002 HC RX W HCPCS: Performed by: INTERNAL MEDICINE

## 2024-04-07 PROCEDURE — 85025 COMPLETE CBC W/AUTO DIFF WBC: CPT

## 2024-04-07 PROCEDURE — 2580000003 HC RX 258: Performed by: NURSE PRACTITIONER

## 2024-04-07 PROCEDURE — 6360000002 HC RX W HCPCS: Performed by: NURSE PRACTITIONER

## 2024-04-07 PROCEDURE — 80048 BASIC METABOLIC PNL TOTAL CA: CPT

## 2024-04-07 RX ORDER — AMIODARONE HYDROCHLORIDE 200 MG/1
200 TABLET ORAL DAILY
Status: DISCONTINUED | OUTPATIENT
Start: 2024-04-07 | End: 2024-04-08 | Stop reason: HOSPADM

## 2024-04-07 RX ADMIN — AMIODARONE HYDROCHLORIDE 200 MG: 200 TABLET ORAL at 14:10

## 2024-04-07 RX ADMIN — PRAVASTATIN SODIUM 40 MG: 40 TABLET ORAL at 20:10

## 2024-04-07 RX ADMIN — AMIODARONE HYDROCHLORIDE 0.5 MG/MIN: 50 INJECTION, SOLUTION INTRAVENOUS at 03:54

## 2024-04-07 RX ADMIN — POLYETHYLENE GLYCOL 3350 17 G: 17 POWDER, FOR SOLUTION ORAL at 08:02

## 2024-04-07 RX ADMIN — Medication 400 UNITS: at 08:02

## 2024-04-07 RX ADMIN — SODIUM CHLORIDE, PRESERVATIVE FREE 10 ML: 5 INJECTION INTRAVENOUS at 20:10

## 2024-04-07 RX ADMIN — METOPROLOL TARTRATE 100 MG: 50 TABLET, FILM COATED ORAL at 20:10

## 2024-04-07 RX ADMIN — ASPIRIN 81 MG 81 MG: 81 TABLET ORAL at 08:01

## 2024-04-07 RX ADMIN — THERA TABS 1 TABLET: TAB at 08:01

## 2024-04-07 RX ADMIN — DIGOXIN 250 MCG: 250 INJECTION, SOLUTION INTRAMUSCULAR; INTRAVENOUS; PARENTERAL at 05:26

## 2024-04-07 RX ADMIN — METOPROLOL TARTRATE 100 MG: 50 TABLET, FILM COATED ORAL at 08:01

## 2024-04-07 NOTE — PROGRESS NOTES
[facility] - Inpatient Rehabilitation Department   Phone: [phone]    Physical Therapy    [x] Initial Evaluation            [] Daily Treatment Note         [] Discharge Summary      Patient: Charline Renner   : 1940   MRN: [id number]   Date of Service:  2024  Admitting Diagnosis: Atrial fibrillation with RVR (HCC)  Current Admission Summary: Charline Renner is a 83 y.o. female admitted from ARU for A. Fib. Patient was recently discharged from [place] where she presented on 2024 for R trochanteric femur fracture due to mechanical fall. She underwent R hip surgery 4/3/24.  Past Medical History:  has a past medical history of A-fib (HCC), Hyperlipidemia, Hypertension, and Macular degeneration.  Past Surgical History:  has a past surgical history that includes hip surgery (Right, 4/3/2024).  Discharge Recommendations: Charline Renner scored a 9/24 on the AM-PAC short mobility form. Current research shows that an AM-PAC score of 17 or less is typically not associated with a discharge to the patient's home setting. Based on the patient's AM-PAC score and their current functional mobility deficits, it is recommended that the patient have 5-7 sessions per week of Physical Therapy at d/c to increase the patient's independence.  At this time, this patient demonstrates complex nursing, medical, and rehabilitative needs, and would benefit from intensive rehabilitation services upon discharge from the Inpatient setting.  This patient demonstrates the ability to participate in and benefit from an intensive therapy program with a coordinated interdisciplinary team approach to foster frequent, structured, and documented communication among disciplines, who will work together to establish, prioritize, and achieve treatment goals. Please see assessment section for further patient specific details.    If patient discharges prior to next session this note will serve as a discharge summary.  Please see below for the  latest assessment towards goals.    DME Required For Discharge: DME to be determined pending patient progress  Precautions/Restrictions: high fall risk, weight bearing, up as tolerated  Weight Bearing Restrictions: weight bearing as tolerated  [] Right Upper Extremity  [] Left Upper Extremity [x] Right Lower Extremity  [] Left Lower Extremity     Required Braces/Orthotics: no braces required   [] Right  [] Left  Positional Restrictions: no positional restrictions    Pre-Admission Information   Lives With: significant other, partner has dementia                                                   Type of Home: condo  Home Layout: tri-level  Home Access:  2 step to enter without rails  also garge entrance 4-5 steps with hand rail, once in the house has 13 steps to bed/bath with rails on right ascending   Bathroom Layout:  1/2 bath first floor, full bath second floor, tub shower    Bathroom Equipment: has plans to get grab bars installed through [employer]- usually stands to shower   Toilet Height: standard height  Home Equipment:  st cane, RW, BSC   Transfer Assistance: Independent without use of device  Ambulation Assistance:Independent without use of device  ADL Assistance: independent with all ADL's  IADL Assistance: independent with homemaking tasks   Active :        [] Yes            [x] No  Current Employment:retired.  Occupation: retired bookeeper   Recent Falls:  this fall only in last 6 months- notes lost her balance.     Examination   Vision:   Wears glasses for cosmetic purposes - legally blind related to macular degeneration - sees shapes/outlines but not details   Hearing:   hard of hearing  Observation:   General Observation:  Pt on 1L O2 via nasal cannula on arrival. R hip dressing is saturated with yellow drainage. Noted a very small green area on dressing as well.  Posture:   Mild forward head, rounded shoulders, and increased thoracic kyphosis in sitting and standing.  Sensation:   denies numbness and

## 2024-04-07 NOTE — PROGRESS NOTES
\"SPECGRAV\", \"GLUCOSEU\"    Radiology:  CT CHEST PULMONARY EMBOLISM W CONTRAST   Final Result   1. No evidence of pulmonary embolism.   2. Moderate emphysema.   3. Suspected right atrial enlargement.   4. Bilateral 1.3 cm adrenal adenomas.         XR CHEST PORTABLE   Final Result   Interval placement of PICC line.  No complication.      No other significant interval change.             Assessment/Plan:    Active Hospital Problems    Diagnosis Date Noted    History of gastrointestinal bleeding [Z87.19] 04/06/2024    Coronary artery disease involving native coronary artery of native heart without angina pectoris [I25.10] 04/06/2024    Elevated troponin [R79.89] 04/06/2024    Atrial fibrillation with RVR (HCC) [I48.91] 04/02/2024       Acute Medical Issues Being Addressed:  83-year-old recent intertrochanteric nailing for femur fracture transferred from ARU due to uncontrolled heart rate    Atrial fibrillation with rapid ventricular rate  CTPA no evidence of PE  On heparin drip will discuss with cardiology regarding transitioning to oral anticoagulation versus discontinuing anticoagulation entirely as with previous anticoagulation attempt she had GI bleed  On amnio drip and digoxin  Will discontinue digoxin as she is back in sinus rhythm  Toprol changed to metoprolol 100 twice daily  Will discuss with cardiology regarding transitioning to oral amiodarone     History of gastrointestinal bleeding  Remote history of presumed GIB in 2018; ECG/Cscope negative at the time  She was not restarted on OAC due to that event  Currently no active bleeding, Hgb stable  Was started on IV heparin until PE ruled out will discuss with cardiology regarding transitioning to oral anticoagulation versus discontinuing anticoagulation given previous history of GI bleed     Coronary artery disease involving native coronary artery of native heart without angina pectoris   RCA with L to R collaterals on cath 2012 [facility] medical  management  Recent Echo preserved LVEF     Elevated troponin  Elevated troponin is flat and adynamic indicative of Type II demand ischemia in setting of AF RVR  Continue low dose aspirin  CT Chest PE ordered      Right intertrochanteric femur fracture  S/p right hip IM nailing  Weightbearing as tolerated    DVT Prophylaxis: On IV heparin  Diet: ADULT DIET; Regular  ADULT ORAL NUTRITION SUPPLEMENT; Breakfast, Dinner; Standard High Calorie/High Protein Oral Supplement  Code Status: Full Code      Dispo - once acute medical processes have resolved    Ny Fine MD

## 2024-04-07 NOTE — PROGRESS NOTES
[facility] - Inpatient Rehabilitation Department   Phone: [phone]    Occupational Therapy    [x] Initial Evaluation            [] Daily Treatment Note         [] Discharge Summary      Patient: Charline Renner   : 1940   MRN: [id number]   Date of Service:  2024    Admitting Diagnosis:  Atrial fibrillation with RVR (HCC)  Current Admission Summary: Charline Renner is a 83 y.o. female admitted from ARU for A. Fib. Patient was recently discharged from [place] where she presented on 2024 for R trochanteric femur fracture due to mechanical fall. She underwent R hip surgery IM nailing 4/3/24.   Past Medical History:  has a past medical history of A-fib (HCC), Hyperlipidemia, Hypertension, and Macular degeneration.  Past Surgical History:  has a past surgical history that includes hip surgery (Right, 4/3/2024).    Discharge Recommendations: Charline Renner scored a 13/24 on the AM-PAC ADL Inpatient form. Current research shows that an AM-PAC score of 17 or less is typically not associated with a discharge to the patient's home setting. Based on the patient's AM-PAC score and their current ADL deficits, it is recommended that the patient have 5-7 sessions per week of Occupational Therapy at d/c to increase the patient's independence.  At this time, this patient demonstrates complex nursing, medical, and rehabilitative needs, and would benefit from intensive rehabilitation services upon discharge from the Inpatient setting.  This patient demonstrates the ability to participate in and benefit from an intensive therapy program with a coordinated interdisciplinary team approach to foster frequent, structured, and documented communication among disciplines, who will work together to establish, prioritize, and achieve treatment goals. Please see assessment section for further patient specific details.    If patient discharges prior to next session this note will serve as a discharge summary.  Please see  understanding, would benefit from continued reinforcement    Assessment  Activity Tolerance: limited by pain   Impairments Requiring Therapeutic Intervention: decreased functional mobility, decreased ADL status, decreased strength, decreased endurance  Prognosis: good  Clinical Assessment: Patient to hospital after mechanical fall with femur fracture which required IM nailing repair. Course complicated by A-fib. Patient typically independent without a device for ADLs and IADLs. Patient presents with the above deficits and would benefit from continued skilled OT to address return to PLOF.     Safety Interventions: patient left in chair, chair alarm in place, call light within reach, and nurse notified    Plan  Frequency: 7 x/week  Current Treatment Recommendations: strengthening, balance training, functional mobility training, transfer training, endurance training, patient/caregiver education, and ADL/self-care training    Goals  Patient Goals: to go to ARU/to get stronger and be able to walk so that she can get back home as soon as possible   Short Term Goals:  Time Frame: discharge  Patient will complete upper body ADL at set up assistance   Patient will complete lower body ADL at moderate assistance, with AE as needed    Patient will complete toileting at moderate assistance   Patient will complete grooming at set up assistance   Patient will complete functional transfers at minimal assistance   Patient will complete functional mobility at minimal assistance with RW    Above goals reviewed on 4/7/2024.  All goals are ongoing at this time unless indicated above.       Therapy Session Time     Individual Group Co-treatment   Time In    1312   Time Out    1405   Minutes    53        Timed Code Treatment Minutes:   38  Total Treatment Minutes:  53       Electronically Signed By: Waqas Dodson OT

## 2024-04-07 NOTE — CARE COORDINATION
04/07/24 1133   Readmission Assessment   Number of Days since last admission? 1-7 days   Previous Disposition Other (comment)  (transferred from [facility] ARU)   Who is being Interviewed Patient   What was the patient's/caregiver's perception as to why they think they needed to return back to the hospital? Other (Comment)  (pt states \" I am here for rehab\")   Did you visit your Primary Care Physician after you left the hospital, before you returned this time? No   Why weren't you able to visit your PCP? Other (Comment)  (has been in hospital , transferred to floor from ARU)   Did you see a specialist, such as Cardiac, Pulmonary, Orthopedic Physician, etc. after you left the hospital? No  (has been here in hospital)   Who advised the patient to return to the hospital? Other (Comment);Physician  (transferred from our ARU to floor)   Does the patient report anything that got in the way of taking their medications? No   In our efforts to provide the best possible care to you and others like you, can you think of anything that we could have done to help you after you left the hospital the first time, so that you might not have needed to return so soon? Other (Comment)  (transferred from our ARU)

## 2024-04-07 NOTE — PROGRESS NOTES
[facility]   Electrophysiology   Date: 4/7/2024  Reason for Follow up: Shortness of breath      HPI: Charline Renner is a 83 y.o. female admitted from ARU for cardiology consultation.  Patient was recently discharged from [place] where she presented on 4/1/2024 for R trochanteric femur fracture due to mechanical fall.  She underwent R hip surgery 4/3.  Course was c/b AFIB.  Medical history was reviewed, significant for CAD and AFIB followed at [facility].  She had GIB in 2018 and was taken off of Eliquis.  EGD and colonoscopy was unremarkable at that time.  She follows with outpatient Cardiology at [facility].    Patient has history of single-vessel CAD,  of RCA with left-to-right collaterals, normal EF, PAF, not on anticoagulation due to fall risk and history of GI bleeding, follows up with Dr. Woods at [facility].    She was treated with IV heparin, amiodarone and digoxin which has been discontinued.  She is also on Toprol-XL.    She is converted to sinus rhythm.  Patient seen and examined.   No major events overnight.     Assessment:   Paroxysmal atrial fibrillation  Mechanical fall  CAD    of RCA with left-to-right collateral    Plan:   Patient has converted back to sinus rhythm.    High risk [id number] score.  However she has had history of fall and fracture and also has had GI bleeding.  She states that she she used Eliquis in the past and had GI bleeding after a few weeks of starting Eliquis.  She was told that she cannot take anticoagulation. She is concerned about the risk of bleeding.    We discussed alternatives which include MYNOR closure with Watchman device.   She follows up with Dr. Woods at [facility] and would like to follow-up there.    DC IV heparin  No oral anticoagulation due to falls and GI bleeding.  Continue with aspirin    She is on IV amiodarone.  Change to oral amiodarone. I have discussed potential, and rare side effects of amiodarone including but not

## 2024-04-07 NOTE — PROGRESS NOTES
At shift change, patient on 2 L NC. Oxygen saturation 100%. Attempted weaning and placed on RA. Oxygen saturation down to 82%. Placed patient back on 1 L NC. Oxygen saturation 99%.

## 2024-04-07 NOTE — PLAN OF CARE
Problem: Safety - Adult  Goal: Free from fall injury  4/7/2024 1220 by Chapin Briseno  Outcome: Progressing  4/6/2024 2337 by Alonso Howell RN  Outcome: Progressing     Problem: Discharge Planning  Goal: Discharge to home or other facility with appropriate resources  4/7/2024 1220 by Chapin Briseno  Outcome: Progressing  4/6/2024 2337 by Alonso Howell RN  Outcome: Progressing     Problem: ABCDS Injury Assessment  Goal: Absence of physical injury  4/7/2024 1220 by Chapin Briseno  Outcome: Progressing  4/6/2024 2337 by Alonso Howell RN  Outcome: Progressing     Problem: Skin/Tissue Integrity  Goal: Absence of new skin breakdown  Description: 1.  Monitor for areas of redness and/or skin breakdown  2.  Assess vascular access sites hourly  3.  Every 4-6 hours minimum:  Change oxygen saturation probe site  4.  Every 4-6 hours:  If on nasal continuous positive airway pressure, respiratory therapy assess nares and determine need for appliance change or resting period.  4/7/2024 1220 by Chapin Briseno  Outcome: Progressing  4/6/2024 2337 by Alonso Howell, RN  Outcome: Progressing     Problem: Nutrition Deficit:  Goal: Optimize nutritional status  4/7/2024 1220 by Chapin Briseno  Outcome: Progressing  4/6/2024 2337 by Alonso Howell, RN  Outcome: Progressing

## 2024-04-07 NOTE — CARE COORDINATION
Case Management Assessment  Initial Evaluation    Date/Time of Evaluation: 4/7/2024 11:41 AM  Assessment Completed by: CONNOR INGRAM RN    If patient is discharged prior to next notation, then this note serves as note for discharge by case management.    Patient Name: Charline Renner                   YOB: 1940  Diagnosis: A-fib (HCC) [I48.91]  Atrial fibrillation with RVR (HCC) [I48.91]                   Date / Time: 4/6/2024  1:26 AM    Patient Admission Status: Inpatient   Readmission Risk (Low < 19, Mod (19-27), High > 27): Readmission Risk Score: 19.8    Current PCP: Denny Jensen  PCP verified by CM? Yes    Chart Reviewed: Yes      History Provided by: Patient  Patient Orientation: Alert and Oriented, Place, Person, Situation    Patient Cognition: Alert    Hospitalization in the last 30 days (Readmission):  Yes    If yes, Readmission Assessment in  Navigator will be completed.    Advance Directives:      Code Status: Full Code   Patient's Primary Decision Maker is: Legal Next of Kin      Discharge Planning:    Patient lives with: Spouse/Significant Other Type of Home: House  Primary Care Giver: Self  Patient Support Systems include: Children, Spouse/Significant Other, Other (Comment) (states has roomate with dementia but he is with his daughter right now)   Current Financial resources: Medicare  Current community resources: Other (Comment) (Has MOW through [facility])  Current services prior to admission: Meals On Wheels            Current DME:              Type of Home Care services:  None    ADLS  Prior functional level: Assistance with the following:, Mobility, Other (see comment) (was on our ARU)  Current functional level: Other (see comment), Mobility (therapy pending)    PT AM-PAC:   /24  OT AM-PAC:   /24    Family can provide assistance at DC: Yes  Would you like Case Management to discuss the discharge plan with any other family members/significant others, and if so, who? Yes (son if

## 2024-04-07 NOTE — PLAN OF CARE
Problem: Safety - Adult  Goal: Free from fall injury  4/6/2024 2337 by Alonso Howell RN  Outcome: Progressing  4/6/2024 1003 by Flakita Gupta RN  Outcome: Progressing     Problem: Discharge Planning  Goal: Discharge to home or other facility with appropriate resources  4/6/2024 2337 by Alonso Howell RN  Outcome: Progressing  4/6/2024 1003 by Flakita Gupta RN  Outcome: Progressing     Problem: ABCDS Injury Assessment  Goal: Absence of physical injury  4/6/2024 2337 by Alonso Howell RN  Outcome: Progressing  4/6/2024 1003 by Flakita Gupta RN  Outcome: Progressing     Problem: Skin/Tissue Integrity  Goal: Absence of new skin breakdown  Description: 1.  Monitor for areas of redness and/or skin breakdown  2.  Assess vascular access sites hourly  3.  Every 4-6 hours minimum:  Change oxygen saturation probe site  4.  Every 4-6 hours:  If on nasal continuous positive airway pressure, respiratory therapy assess nares and determine need for appliance change or resting period.  4/6/2024 2337 by Alonso Howell RN  Outcome: Progressing  4/6/2024 1003 by Flakita Gupta RN  Outcome: Progressing     Problem: Nutrition Deficit:  Goal: Optimize nutritional status  Outcome: Progressing

## 2024-04-08 ENCOUNTER — HOSPITAL ENCOUNTER (INPATIENT)
Age: 84
DRG: 561 | End: 2024-04-08
Attending: STUDENT IN AN ORGANIZED HEALTH CARE EDUCATION/TRAINING PROGRAM | Admitting: STUDENT IN AN ORGANIZED HEALTH CARE EDUCATION/TRAINING PROGRAM
Payer: MEDICARE

## 2024-04-08 VITALS
HEART RATE: 60 BPM | HEIGHT: 65 IN | RESPIRATION RATE: 18 BRPM | TEMPERATURE: 98.2 F | WEIGHT: 166 LBS | BODY MASS INDEX: 27.66 KG/M2 | DIASTOLIC BLOOD PRESSURE: 45 MMHG | SYSTOLIC BLOOD PRESSURE: 111 MMHG | OXYGEN SATURATION: 98 %

## 2024-04-08 PROBLEM — S72.001S CLOSED RIGHT HIP FRACTURE, SEQUELA: Status: ACTIVE | Noted: 2024-04-08

## 2024-04-08 LAB
ANION GAP SERPL CALCULATED.3IONS-SCNC: 6 MMOL/L (ref 3–16)
BUN SERPL-MCNC: 20 MG/DL (ref 7–20)
CALCIUM SERPL-MCNC: 8.2 MG/DL (ref 8.3–10.6)
CHLORIDE SERPL-SCNC: 103 MMOL/L (ref 99–110)
CO2 SERPL-SCNC: 27 MMOL/L (ref 21–32)
CREAT SERPL-MCNC: <0.5 MG/DL (ref 0.6–1.2)
DEPRECATED RDW RBC AUTO: 12.9 % (ref 12.4–15.4)
GFR SERPLBLD CREATININE-BSD FMLA CKD-EPI: >90 ML/MIN/{1.73_M2}
GLUCOSE SERPL-MCNC: 101 MG/DL (ref 70–99)
HCT VFR BLD AUTO: 27.3 % (ref 36–48)
HGB BLD-MCNC: 9.3 G/DL (ref 12–16)
MCH RBC QN AUTO: 32.8 PG (ref 26–34)
MCHC RBC AUTO-ENTMCNC: 34.2 G/DL (ref 31–36)
MCV RBC AUTO: 95.9 FL (ref 80–100)
PLATELET # BLD AUTO: 186 K/UL (ref 135–450)
PMV BLD AUTO: 9.2 FL (ref 5–10.5)
POTASSIUM SERPL-SCNC: 4.3 MMOL/L (ref 3.5–5.1)
RBC # BLD AUTO: 2.84 M/UL (ref 4–5.2)
SODIUM SERPL-SCNC: 136 MMOL/L (ref 136–145)
WBC # BLD AUTO: 8.9 K/UL (ref 4–11)

## 2024-04-08 PROCEDURE — 85027 COMPLETE CBC AUTOMATED: CPT

## 2024-04-08 PROCEDURE — 6370000000 HC RX 637 (ALT 250 FOR IP): Performed by: PHYSICIAN ASSISTANT

## 2024-04-08 PROCEDURE — 97530 THERAPEUTIC ACTIVITIES: CPT

## 2024-04-08 PROCEDURE — 97535 SELF CARE MNGMENT TRAINING: CPT

## 2024-04-08 PROCEDURE — 99232 SBSQ HOSP IP/OBS MODERATE 35: CPT

## 2024-04-08 PROCEDURE — 6370000000 HC RX 637 (ALT 250 FOR IP): Performed by: INTERNAL MEDICINE

## 2024-04-08 PROCEDURE — 6370000000 HC RX 637 (ALT 250 FOR IP): Performed by: STUDENT IN AN ORGANIZED HEALTH CARE EDUCATION/TRAINING PROGRAM

## 2024-04-08 PROCEDURE — 80048 BASIC METABOLIC PNL TOTAL CA: CPT

## 2024-04-08 PROCEDURE — 1280000000 HC REHAB R&B

## 2024-04-08 PROCEDURE — 2580000003 HC RX 258: Performed by: STUDENT IN AN ORGANIZED HEALTH CARE EDUCATION/TRAINING PROGRAM

## 2024-04-08 RX ORDER — BISACODYL 10 MG
10 SUPPOSITORY, RECTAL RECTAL DAILY PRN
Status: DISCONTINUED | OUTPATIENT
Start: 2024-04-08 | End: 2024-04-19 | Stop reason: HOSPADM

## 2024-04-08 RX ORDER — SODIUM CHLORIDE 0.9 % (FLUSH) 0.9 %
5-40 SYRINGE (ML) INJECTION EVERY 12 HOURS SCHEDULED
Status: CANCELLED | OUTPATIENT
Start: 2024-04-08

## 2024-04-08 RX ORDER — ENOXAPARIN SODIUM 100 MG/ML
40 INJECTION SUBCUTANEOUS DAILY
Status: DISCONTINUED | OUTPATIENT
Start: 2024-04-09 | End: 2024-04-19 | Stop reason: HOSPADM

## 2024-04-08 RX ORDER — ACETAMINOPHEN 325 MG/1
650 TABLET ORAL EVERY 4 HOURS PRN
Status: CANCELLED | OUTPATIENT
Start: 2024-04-08

## 2024-04-08 RX ORDER — PRAVASTATIN SODIUM 40 MG
40 TABLET ORAL NIGHTLY
Status: DISCONTINUED | OUTPATIENT
Start: 2024-04-08 | End: 2024-04-19 | Stop reason: HOSPADM

## 2024-04-08 RX ORDER — POLYETHYLENE GLYCOL 3350 17 G/17G
17 POWDER, FOR SOLUTION ORAL DAILY
Status: CANCELLED | OUTPATIENT
Start: 2024-04-09

## 2024-04-08 RX ORDER — VITAMIN E 268 MG
400 CAPSULE ORAL DAILY
Status: CANCELLED | OUTPATIENT
Start: 2024-04-09

## 2024-04-08 RX ORDER — VITAMIN E 268 MG
400 CAPSULE ORAL DAILY
Status: DISCONTINUED | OUTPATIENT
Start: 2024-04-09 | End: 2024-04-19 | Stop reason: HOSPADM

## 2024-04-08 RX ORDER — BISACODYL 5 MG/1
5 TABLET, DELAYED RELEASE ORAL DAILY PRN
Status: CANCELLED | OUTPATIENT
Start: 2024-04-08

## 2024-04-08 RX ORDER — ONDANSETRON 4 MG/1
4 TABLET, ORALLY DISINTEGRATING ORAL EVERY 8 HOURS PRN
Status: CANCELLED | OUTPATIENT
Start: 2024-04-08

## 2024-04-08 RX ORDER — METOPROLOL TARTRATE 50 MG/1
100 TABLET, FILM COATED ORAL 2 TIMES DAILY
Status: CANCELLED | OUTPATIENT
Start: 2024-04-08

## 2024-04-08 RX ORDER — ONDANSETRON 4 MG/1
4 TABLET, ORALLY DISINTEGRATING ORAL EVERY 8 HOURS PRN
Status: DISCONTINUED | OUTPATIENT
Start: 2024-04-08 | End: 2024-04-19 | Stop reason: HOSPADM

## 2024-04-08 RX ORDER — BISACODYL 5 MG/1
5 TABLET, DELAYED RELEASE ORAL DAILY PRN
Status: DISCONTINUED | OUTPATIENT
Start: 2024-04-08 | End: 2024-04-19 | Stop reason: HOSPADM

## 2024-04-08 RX ORDER — ENOXAPARIN SODIUM 100 MG/ML
40 INJECTION SUBCUTANEOUS DAILY
Status: CANCELLED | OUTPATIENT
Start: 2024-04-09

## 2024-04-08 RX ORDER — OXYCODONE HCL 5 MG/5 ML
5 SOLUTION, ORAL ORAL EVERY 4 HOURS PRN
Status: CANCELLED | OUTPATIENT
Start: 2024-04-08

## 2024-04-08 RX ORDER — SODIUM CHLORIDE 0.9 % (FLUSH) 0.9 %
5-40 SYRINGE (ML) INJECTION PRN
Status: DISCONTINUED | OUTPATIENT
Start: 2024-04-08 | End: 2024-04-19 | Stop reason: HOSPADM

## 2024-04-08 RX ORDER — ALBUTEROL SULFATE 2.5 MG/3ML
2.5 SOLUTION RESPIRATORY (INHALATION) EVERY 6 HOURS PRN
Status: DISCONTINUED | OUTPATIENT
Start: 2024-04-08 | End: 2024-04-16

## 2024-04-08 RX ORDER — SODIUM CHLORIDE 9 MG/ML
INJECTION, SOLUTION INTRAVENOUS PRN
Status: CANCELLED | OUTPATIENT
Start: 2024-04-08

## 2024-04-08 RX ORDER — SODIUM CHLORIDE 0.9 % (FLUSH) 0.9 %
5-40 SYRINGE (ML) INJECTION PRN
Status: CANCELLED | OUTPATIENT
Start: 2024-04-08

## 2024-04-08 RX ORDER — PRAVASTATIN SODIUM 40 MG
40 TABLET ORAL NIGHTLY
Status: CANCELLED | OUTPATIENT
Start: 2024-04-08

## 2024-04-08 RX ORDER — ALBUTEROL SULFATE 2.5 MG/3ML
2.5 SOLUTION RESPIRATORY (INHALATION) EVERY 6 HOURS PRN
Status: CANCELLED | OUTPATIENT
Start: 2024-04-08

## 2024-04-08 RX ORDER — AMIODARONE HYDROCHLORIDE 200 MG/1
200 TABLET ORAL DAILY
Status: DISCONTINUED | OUTPATIENT
Start: 2024-04-09 | End: 2024-04-19 | Stop reason: HOSPADM

## 2024-04-08 RX ORDER — SODIUM CHLORIDE 0.9 % (FLUSH) 0.9 %
5-40 SYRINGE (ML) INJECTION EVERY 12 HOURS SCHEDULED
Status: DISCONTINUED | OUTPATIENT
Start: 2024-04-08 | End: 2024-04-12

## 2024-04-08 RX ORDER — ACETAMINOPHEN 325 MG/1
650 TABLET ORAL EVERY 4 HOURS PRN
Status: DISCONTINUED | OUTPATIENT
Start: 2024-04-08 | End: 2024-04-19 | Stop reason: HOSPADM

## 2024-04-08 RX ORDER — ASPIRIN 81 MG/1
81 TABLET, CHEWABLE ORAL DAILY
Status: DISCONTINUED | OUTPATIENT
Start: 2024-04-09 | End: 2024-04-19 | Stop reason: HOSPADM

## 2024-04-08 RX ORDER — BISACODYL 10 MG
10 SUPPOSITORY, RECTAL RECTAL DAILY PRN
Status: CANCELLED | OUTPATIENT
Start: 2024-04-08

## 2024-04-08 RX ORDER — ASPIRIN 81 MG/1
81 TABLET, CHEWABLE ORAL DAILY
Status: CANCELLED | OUTPATIENT
Start: 2024-04-09

## 2024-04-08 RX ORDER — METOPROLOL TARTRATE 100 MG/1
100 TABLET ORAL 2 TIMES DAILY
Qty: 60 TABLET | Refills: 0 | Status: ON HOLD
Start: 2024-04-08 | End: 2024-04-19

## 2024-04-08 RX ORDER — AMIODARONE HYDROCHLORIDE 200 MG/1
200 TABLET ORAL DAILY
Status: CANCELLED | OUTPATIENT
Start: 2024-04-09

## 2024-04-08 RX ORDER — MULTIVITAMIN WITH IRON
1 TABLET ORAL DAILY
Status: CANCELLED | OUTPATIENT
Start: 2024-04-09

## 2024-04-08 RX ORDER — MULTIVITAMIN WITH IRON
1 TABLET ORAL DAILY
Status: DISCONTINUED | OUTPATIENT
Start: 2024-04-09 | End: 2024-04-19 | Stop reason: HOSPADM

## 2024-04-08 RX ORDER — POLYETHYLENE GLYCOL 3350 17 G/17G
17 POWDER, FOR SOLUTION ORAL DAILY
Status: DISCONTINUED | OUTPATIENT
Start: 2024-04-09 | End: 2024-04-19 | Stop reason: HOSPADM

## 2024-04-08 RX ORDER — SODIUM CHLORIDE 9 MG/ML
INJECTION, SOLUTION INTRAVENOUS PRN
Status: DISCONTINUED | OUTPATIENT
Start: 2024-04-08 | End: 2024-04-19 | Stop reason: HOSPADM

## 2024-04-08 RX ORDER — AMIODARONE HYDROCHLORIDE 200 MG/1
200 TABLET ORAL DAILY
Qty: 30 TABLET | Refills: 0 | Status: ON HOLD
Start: 2024-04-09 | End: 2024-04-19

## 2024-04-08 RX ORDER — METOPROLOL TARTRATE 50 MG/1
100 TABLET, FILM COATED ORAL 2 TIMES DAILY
Status: DISCONTINUED | OUTPATIENT
Start: 2024-04-08 | End: 2024-04-19 | Stop reason: HOSPADM

## 2024-04-08 RX ORDER — OXYCODONE HCL 5 MG/5 ML
5 SOLUTION, ORAL ORAL EVERY 4 HOURS PRN
Status: DISCONTINUED | OUTPATIENT
Start: 2024-04-08 | End: 2024-04-10

## 2024-04-08 RX ADMIN — ASPIRIN 81 MG 81 MG: 81 TABLET ORAL at 08:16

## 2024-04-08 RX ADMIN — THERA TABS 1 TABLET: TAB at 08:16

## 2024-04-08 RX ADMIN — Medication 400 UNITS: at 08:16

## 2024-04-08 RX ADMIN — POLYETHYLENE GLYCOL 3350 17 G: 17 POWDER, FOR SOLUTION ORAL at 08:16

## 2024-04-08 RX ADMIN — AMIODARONE HYDROCHLORIDE 200 MG: 200 TABLET ORAL at 08:16

## 2024-04-08 RX ADMIN — Medication 10 ML: at 20:24

## 2024-04-08 RX ADMIN — METOPROLOL TARTRATE 100 MG: 50 TABLET, FILM COATED ORAL at 20:21

## 2024-04-08 RX ADMIN — PRAVASTATIN SODIUM 40 MG: 40 TABLET ORAL at 20:21

## 2024-04-08 RX ADMIN — METOPROLOL TARTRATE 100 MG: 50 TABLET, FILM COATED ORAL at 08:15

## 2024-04-08 ASSESSMENT — PAIN SCALES - WONG BAKER
WONGBAKER_NUMERICALRESPONSE: NO HURT

## 2024-04-08 ASSESSMENT — PAIN SCALES - GENERAL
PAINLEVEL_OUTOF10: 0
PAINLEVEL_OUTOF10: 0

## 2024-04-08 NOTE — CARE COORDINATION
04/08/24 1310   IMM Letter   IMM Letter given to Patient/Family/Significant other/Guardian/POA/by: CM provided of IMM - Verbal provide, copy left with Pt. She is agreeable w/discharge.   IMM Letter date given: 04/08/24   IMM Letter time given: 1310     Electronically signed by Laney Sloan on 4/8/2024 at 1:10 PM

## 2024-04-08 NOTE — PROGRESS NOTES
[facility] - Inpatient Rehabilitation Department   Phone: [phone]    Occupational Therapy    [] Initial Evaluation            [x] Daily Treatment Note         [] Discharge Summary      Patient: Charline Renner   : 1940   MRN: [id number]   Date of Service:  2024    Admitting Diagnosis:  Atrial fibrillation with RVR (HCC)  Current Admission Summary: Charline Renner is a 83 y.o. female admitted from ARU for A. Fib. Patient was recently discharged from [place] where she presented on 2024 for R trochanteric femur fracture due to mechanical fall. She underwent R hip surgery IM nailing 4/3/24.   Past Medical History:  has a past medical history of A-fib (HCC), Hyperlipidemia, Hypertension, and Macular degeneration.  Past Surgical History:  has a past surgical history that includes hip surgery (Right, 4/3/2024).    Discharge Recommendations: Charline Renner scored a 14/24 on the AM-PAC ADL Inpatient form. Current research shows that an AM-PAC score of 17 or less is typically not associated with a discharge to the patient's home setting. Based on the patient's AM-PAC score and their current ADL deficits, it is recommended that the patient have 5-7 sessions per week of Occupational Therapy at d/c to increase the patient's independence.  At this time, this patient demonstrates complex nursing, medical, and rehabilitative needs, and would benefit from intensive rehabilitation services upon discharge from the Inpatient setting.  This patient demonstrates the ability to participate in and benefit from an intensive therapy program with a coordinated interdisciplinary team approach to foster frequent, structured, and documented communication among disciplines, who will work together to establish, prioritize, and achieve treatment goals. Please see assessment section for further patient specific details.    If patient discharges prior to next session this note will serve as a discharge summary.  Please see

## 2024-04-08 NOTE — DISCHARGE INSTR - COC
Continuity of Care Form    Patient Name: Charline Renner   :  1940  MRN:  [id number]    Admit date:  2024  Discharge date:  2024    Code Status Order: Full Code   Advance Directives:     Admitting Physician:  Gillian Cody MD  PCP: Denny Jensen    Discharging Nurse: [facility] Unit/Room#: N7G-7788/5911-01  Discharging Unit Phone Number: [phone]    Emergency Contact:   Extended Emergency Contact Information  Primary Emergency Contact: danii renner  Home Phone: [phone]  Mobile Phone: [phone]  Relation: Child    Past Surgical History:  Past Surgical History:   Procedure Laterality Date    HIP SURGERY Right 4/3/2024    RIGHT HIP INTRAMEDULLARY NAILING performed by Manjinder Negro MD at [facility] OR       Immunization History:   Immunization History   Administered Date(s) Administered    COVID-19, PFIZER PURPLE top, DILUTE for use, (age 12 y+), 30mcg/0.3mL 2021, 2021, 10/09/2021       Active Problems:  Patient Active Problem List   Diagnosis Code    Intertrochanteric fracture of right femur, closed, initial encounter ([facility]) S72.141A    Atrial fibrillation with RVR ([facility]) I48.91    Closed fracture of femur, intertrochanteric, right, sequela S72.141S    History of gastrointestinal bleeding Z87.19    Coronary artery disease involving native coronary artery of native heart without angina pectoris I25.10    Elevated troponin R79.89       Isolation/Infection:   Isolation            No Isolation          Patient Infection Status       None to display            Nurse Assessment:  Last Vital Signs: BP (!) 111/45   Pulse 60   Temp 98.2 °F (36.8 °C) (Temporal)   Resp 18   Ht 1.651 m (5' 5\")   Wt 75.3 kg (166 lb)   SpO2 98%   BMI 27.62 kg/m²     Last documented pain score (0-10 scale):    Last Weight:   Wt Readings from Last 1 Encounters:   24 75.3 kg (166 lb)     Mental Status:  oriented    IV Access:  - PICC - site  R Upper Arm, insertion date: 2024    Nursing

## 2024-04-08 NOTE — PROGRESS NOTES
[facility] - Inpatient Rehabilitation Department   Phone: [phone]    Occupational Therapy    [] Initial Evaluation            [x] Daily Treatment Note         [] Discharge Summary      Patient: Charline Renner   : 1940   MRN: [id number]   Date of Service:  2024    Admitting Diagnosis:  Atrial fibrillation with RVR (HCC)  Current Admission Summary: Charline Renner is a 83 y.o. female admitted from ARU for A. Fib. Patient was recently discharged from [place] where she presented on 2024 for R trochanteric femur fracture due to mechanical fall. She underwent R hip surgery IM nailing 4/3/24.   Past Medical History:  has a past medical history of A-fib (HCC), Hyperlipidemia, Hypertension, and Macular degeneration.  Past Surgical History:  has a past surgical history that includes hip surgery (Right, 4/3/2024).    Discharge Recommendations: Charline Renner scored a 14/24 on the AM-PAC ADL Inpatient form. Current research shows that an AM-PAC score of 17 or less is typically not associated with a discharge to the patient's home setting. Based on the patient's AM-PAC score and their current ADL deficits, it is recommended that the patient have 5-7 sessions per week of Occupational Therapy at d/c to increase the patient's independence.  At this time, this patient demonstrates complex nursing, medical, and rehabilitative needs, and would benefit from intensive rehabilitation services upon discharge from the Inpatient setting.  This patient demonstrates the ability to participate in and benefit from an intensive therapy program with a coordinated interdisciplinary team approach to foster frequent, structured, and documented communication among disciplines, who will work together to establish, prioritize, and achieve treatment goals. Please see assessment section for further patient specific details.    If patient discharges prior to next session this note will serve as a discharge summary.  Please see

## 2024-04-08 NOTE — PROGRESS NOTES
[facility]   Electrophysiology Progress Note     Date: 4/8/2024  Admit Date: 4/6/2024     Reason for consultation: Shortness of breath     Chief Complaint: No chief complaint on file.      History of Present Illness: History obtained from patient and medical record.     Charline Renner is a 83 y.o. female with a past medical history of paroxysmal atrial fibrillation, CAD, GI bleed, right femur fx, HTN and HLD who was admitted to from ARU for cardiology consult.   She had right hip surgery to repair a trochanteric fracture on 4/3 after a fall. Pt had atrial fibrillation after a gallbladder surgery in 2018 and she was put on Eliquis. A couple of months after, she developed a GI bleed and was take off of Eliquis. She is unable to tell when she is in atrial fibrillation. She has not had any episodes since 2018 to her knowledge.   She went into atrial fibrillation after her hip surgry, EP was consulted for evaluation.     She was treated with IV heparin, Amiodarone and digoxin, she converted to sinus rhythm.  She follows with cardiology at [facility] and would like to continue to follow there.      Interval Hx: Patient seen at bedside, she is working with PT. She denies SOB, palpitations or chest pain. She denies any symptoms when she was in atrial fibrillation. She remains in sinus rhythm.     Patient seen and examined. Clinical notes reviewed. Telemetry reviewed.  No new complaints today. No major events overnight.   Denies having chest pain, palpitations, shortness of breath, orthopnea/PND, cough, or dizziness at the time of this visit.    Allergies:  Allergies   Allergen Reactions    Pcn [Penicillins] Hives       Home Meds:  Prior to Visit Medications    Medication Sig Taking? Authorizing Provider   acetaminophen (TYLENOL) 325 MG tablet Take 2 tablets by mouth every 6 hours for 7 days  Jose Queen MD   aspirin 81 MG chewable tablet Take 1 tablet by mouth daily  Jose Queen MD   fondaparinux (ARIXTRA) 2.5  of JVP. Mild bilateral peripheral edema  Respiratory: Respirations symmetric and unlabored. Lungs clear to auscultation bilaterally, no wheezing, crackles, or rhonchi  Gastrointestinal: Abdomen soft and round. Bowel sounds normoactive in all quadrants without tenderness or masses.  Musculoskeletal: Bilateral upper and  left lower extremity strength 5/5 with full ROM, mild weakness to RLE r/t recent surgery  Neurologic/Psych: Awake and orientated to person, place and time. Calm affect, appropriate mood    Pertinent labs, diagnostic, device, and imaging results reviewed as a part of this visit    Labs:    BMP:   Recent Labs     04/06/24  0456 04/07/24  0433 04/08/24  0557   * 135* 136   K 4.5 4.4 4.3    101 103   CO2 24 29 27   BUN 31* 25* 20   CREATININE 0.7 <0.5* <0.5*     Estimated Creatinine Clearance: 84 mL/min (based on SCr of 0.5 mg/dL).   CBC:   Recent Labs     04/06/24  1113 04/06/24  1729 04/06/24  2328 04/07/24  0433 04/08/24  0557   WBC 9.3  --   --  9.5 8.9   HGB 9.8*   < > 9.6* 9.3* 9.3*   HCT 30.1*   < > 27.9* 27.1* 27.3*   MCV 96.4  --   --  95.8 95.9     --   --  166 186    < > = values in this interval not displayed.     Thyroid: No results found for: \"TSH\", \"Z3DAPGT\", \"J7CLISO\", \"THYROIDAB\"  Lipids: No results found for: \"CHOL\", \"HDL\", \"TRIG\"  LFTS:   Lab Results   Component Value Date/Time    ALT 12 04/06/2024 04:56 AM    AST 31 04/06/2024 04:56 AM    ALKPHOS 64 04/06/2024 04:56 AM    PROT 5.6 04/06/2024 04:56 AM    AGRATIO 1.0 04/06/2024 04:56 AM    BILITOT 1.6 04/06/2024 04:56 AM     Cardiac Enzymes: No results found for: \"CKTOTAL\", \"CKMB\", \"CKMBINDEX\", \"TROPONINI\"  Coags:   Lab Results   Component Value Date/Time    PROTIME 16.1 04/06/2024 11:13 AM    INR 1.27 04/06/2024 11:13 AM       ECG: NSR    ECHO:  1/9/2024 [facility]  Summary:  Left ventricular wall motion is normal.  Overall left ventricular ejection fraction is estimated to be 55-60%.  Left ventricular systolic

## 2024-04-08 NOTE — RT PROTOCOL NOTE
RT Inhaler-Nebulizer Bronchodilator Protocol Note    There is a bronchodilator order in the chart from a provider indicating to follow the RT Bronchodilator Protocol and there is an “Initiate RT Inhaler-Nebulizer Bronchodilator Protocol” order as well (see protocol at bottom of note).    CXR Findings:  No results found.    The findings from the last RT Protocol Assessment were as follows:   History Pulmonary Disease: None or smoker <15 pack years  Respiratory Pattern: Regular pattern and RR 12-20 bpm  Breath Sounds: Slightly diminished and/or crackles  Cough: Strong, spontaneous, non-productive  Indication for Bronchodilator Therapy:    Bronchodilator Assessment Score: 2    Aerosolized bronchodilator medication orders have been revised according to the RT Inhaler-Nebulizer Bronchodilator Protocol below.    Respiratory Therapist to perform RT Therapy Protocol Assessment initially then follow the protocol.  Repeat RT Therapy Protocol Assessment PRN for score 0-3 or on second treatment, BID, and PRN for scores above 3.    No Indications - adjust the frequency to every 6 hours PRN wheezing or bronchospasm, if no treatments needed after 48 hours then discontinue using Per Protocol order mode.     If indication present, adjust the RT bronchodilator orders based on the Bronchodilator Assessment Score as indicated below.  Use Inhaler orders unless patient has one or more of the following: on home nebulizer, not able to hold breath for 10 seconds, is not alert and oriented, cannot activate and use MDI correctly, or respiratory rate 25 breaths per minute or more, then use the equivalent nebulizer order(s) with same Frequency and PRN reasons based on the score.  If a patient is on this medication at home then do not decrease Frequency below that used at home.    0-3 - enter or revise RT bronchodilator order(s) to equivalent RT Bronchodilator order with Frequency of every 4 hours PRN for wheezing or increased work of breathing  using Per Protocol order mode.        4-6 - enter or revise RT Bronchodilator order(s) to two equivalent RT bronchodilator orders with one order with BID Frequency and one order with Frequency of every 4 hours PRN wheezing or increased work of breathing using Per Protocol order mode.        7-10 - enter or revise RT Bronchodilator order(s) to two equivalent RT bronchodilator orders with one order with TID Frequency and one order with Frequency of every 4 hours PRN wheezing or increased work of breathing using Per Protocol order mode.       11-13 - enter or revise RT Bronchodilator order(s) to one equivalent RT bronchodilator order with QID Frequency and an Albuterol order with Frequency of every 4 hours PRN wheezing or increased work of breathing using Per Protocol order mode.      Greater than 13 - enter or revise RT Bronchodilator order(s) to one equivalent RT bronchodilator order with every 4 hours Frequency and an Albuterol order with Frequency of every 2 hours PRN wheezing or increased work of breathing using Per Protocol order mode.     RT to enter RT Home Evaluation for COPD & MDI Assessment order using Per Protocol order mode.    Electronically signed by August Narvaez RCP on 4/8/2024 at 4:19 PM

## 2024-04-08 NOTE — PROGRESS NOTES
04/08/24 1618   RT Protocol   History Pulmonary Disease 0   Respiratory pattern 0   Breath sounds 2   Cough 0   Bronchodilator Assessment Score 2

## 2024-04-08 NOTE — PROGRESS NOTES
Informed to not touch dressing on patient, however stated dressing change due 4/5. Flowsheet states it was due for change however dressing is to remain unchanged and per NP it looks good.

## 2024-04-08 NOTE — PLAN OF CARE

## 2024-04-08 NOTE — PROGRESS NOTES
[facility] - Inpatient Rehabilitation Department   Phone: [phone]    Physical Therapy    [] Initial Evaluation            [x] Daily Treatment Note         [] Discharge Summary      Patient: Charline Renner   : 1940   MRN: [id number]   Date of Service:  2024  Admitting Diagnosis: Atrial fibrillation with RVR (HCC)  Current Admission Summary: Charline Renner is a 83 y.o. female admitted from ARU for A. Fib. Patient was recently discharged from [place] where she presented on 2024 for R trochanteric femur fracture due to mechanical fall. She underwent R hip surgery 4/3/24.  Past Medical History:  has a past medical history of A-fib (HCC), Hyperlipidemia, Hypertension, and Macular degeneration.  Past Surgical History:  has a past surgical history that includes hip surgery (Right, 4/3/2024).    Discharge Recommendations: Charline Renner scored a 12/ on the AM-PAC short mobility form. Current research shows that an AM-PAC score of 17 or less is typically not associated with a discharge to the patient's home setting. Based on the patient's AM-PAC score and their current functional mobility deficits, it is recommended that the patient have 5-7 sessions per week of Physical Therapy at d/c to increase the patient's independence.  At this time, this patient demonstrates complex nursing, medical, and rehabilitative needs, and would benefit from intensive rehabilitation services upon discharge from the Inpatient setting.  This patient demonstrates the ability to participate in and benefit from an intensive therapy program with a coordinated interdisciplinary team approach to foster frequent, structured, and documented communication among disciplines, who will work together to establish, prioritize, and achieve treatment goals. Please see assessment section for further patient specific details.    If patient discharges prior to next session this note will serve as a discharge summary.  Please see below for

## 2024-04-09 PROCEDURE — 6370000000 HC RX 637 (ALT 250 FOR IP): Performed by: STUDENT IN AN ORGANIZED HEALTH CARE EDUCATION/TRAINING PROGRAM

## 2024-04-09 PROCEDURE — 97165 OT EVAL LOW COMPLEX 30 MIN: CPT

## 2024-04-09 PROCEDURE — 97162 PT EVAL MOD COMPLEX 30 MIN: CPT

## 2024-04-09 PROCEDURE — 1280000000 HC REHAB R&B

## 2024-04-09 PROCEDURE — 97530 THERAPEUTIC ACTIVITIES: CPT

## 2024-04-09 PROCEDURE — 6360000002 HC RX W HCPCS: Performed by: STUDENT IN AN ORGANIZED HEALTH CARE EDUCATION/TRAINING PROGRAM

## 2024-04-09 PROCEDURE — 2580000003 HC RX 258: Performed by: STUDENT IN AN ORGANIZED HEALTH CARE EDUCATION/TRAINING PROGRAM

## 2024-04-09 PROCEDURE — 97535 SELF CARE MNGMENT TRAINING: CPT

## 2024-04-09 PROCEDURE — 97116 GAIT TRAINING THERAPY: CPT

## 2024-04-09 RX ADMIN — ACETAMINOPHEN 650 MG: 325 TABLET ORAL at 10:10

## 2024-04-09 RX ADMIN — METOPROLOL TARTRATE 100 MG: 50 TABLET, FILM COATED ORAL at 10:03

## 2024-04-09 RX ADMIN — AMIODARONE HYDROCHLORIDE 200 MG: 200 TABLET ORAL at 10:03

## 2024-04-09 RX ADMIN — Medication 10 ML: at 21:29

## 2024-04-09 RX ADMIN — Medication 400 UNITS: at 10:10

## 2024-04-09 RX ADMIN — METOPROLOL TARTRATE 100 MG: 50 TABLET, FILM COATED ORAL at 20:57

## 2024-04-09 RX ADMIN — ACETAMINOPHEN 650 MG: 325 TABLET ORAL at 16:02

## 2024-04-09 RX ADMIN — THERA TABS 1 TABLET: TAB at 10:03

## 2024-04-09 RX ADMIN — ASPIRIN 81 MG 81 MG: 81 TABLET ORAL at 10:03

## 2024-04-09 RX ADMIN — ENOXAPARIN SODIUM 40 MG: 100 INJECTION SUBCUTANEOUS at 10:03

## 2024-04-09 RX ADMIN — PRAVASTATIN SODIUM 40 MG: 40 TABLET ORAL at 20:57

## 2024-04-09 RX ADMIN — Medication 10 ML: at 16:06

## 2024-04-09 ASSESSMENT — PAIN SCALES - WONG BAKER: WONGBAKER_NUMERICALRESPONSE: NO HURT

## 2024-04-09 ASSESSMENT — PAIN DESCRIPTION - LOCATION
LOCATION: HIP
LOCATION: HIP

## 2024-04-09 ASSESSMENT — PAIN DESCRIPTION - ORIENTATION
ORIENTATION: RIGHT
ORIENTATION: RIGHT

## 2024-04-09 ASSESSMENT — PAIN SCALES - GENERAL
PAINLEVEL_OUTOF10: 6
PAINLEVEL_OUTOF10: 5

## 2024-04-09 ASSESSMENT — PAIN DESCRIPTION - DESCRIPTORS
DESCRIPTORS: ACHING
DESCRIPTORS: ACHING

## 2024-04-09 NOTE — H&P
Patient: Charline Renner  [id number]  Date: 4/9/2024       Chief Complaint: R intertrochanteric femur fracture     History of Present Illness/Hospital Course:  Charline Renner is a 83 y.o. female with PMHx notable for Afib (not on anticoagulation),  macular degeneration (legally blind) who was admitted to [facility] on 3/1 after mechanical fall with right hip fracture. Ortho was consulted, and she underwent IM nail placement on 4/3. Course complicated by Afib w/ RVR. Cardiology had recommend consideration of Watchman device.  Patient was admitted to our rehabilitation unit on 4/5, and the next day she developed a rapid ventricular rate and was found to be in A-fib, and was discharged off a rehabilitation to the acute floor and seen by cardiology.  Patient noted to have paroxysmal atrial fibrillation, and spontaneously converted back to sinus rhythm.  No oral anticoagulation was recommended due to her history of falls and GI bleeding.  She will continue with aspirin treatment.  She will continue on oral amiodarone to help stabilize her rhythm.  Patient will follow-up with her cardiologist at [facility] after discharge.  Patient was felt stable, she is now admitted back to a rehabilitation unit for treatment of her right hip fracture and improving her independence and self-care, ambulation, and safety before discharge to home.  Repeat labs yesterday look good and are stable.          Prior Level of Function:  Independent with mobility, self care, ADLs.      Current Level of Function:  PT  Functional Mobility  Bed Mobility: sit >supine Max of 2 with assist for LE and trunk and for recline in chair total assist.   Comments: Dizzy when LE lowered and needed to re-recline to recover.   Transfers:  Steady assist max of 2 from chair to bed with mod of 2 from stedy seat to stand. Needed assist to place LE and UE appropriately on stedy.   Unable due to sit<>stand with Walker  weakness and dizziness in the presence of lower BP  limbs diffusely. Reflexes normal and symmetric.  Skin: Normal temperature and turgor  MSK: No joint abnormalities noted.     Ext: No significant edema appreciated. No varicosities.           Lab Results   Component Value Date     WBC 10.5 04/06/2024     HGB 10.6 (L) 04/06/2024     HCT 30.7 (L) 04/06/2024     MCV 95.0 04/06/2024      04/06/2024            Lab Results   Component Value Date     INR 1.1 04/01/2024     PROTIME 13.4 04/01/2024            Lab Results   Component Value Date     CREATININE 0.7 04/06/2024     BUN 31 (H) 04/06/2024      (L) 04/06/2024     K 4.5 04/06/2024      04/06/2024     CO2 24 04/06/2024            Lab Results   Component Value Date     ALT 12 04/06/2024     AST 31 04/06/2024     ALKPHOS 64 04/06/2024     BILITOT 1.6 (H) 04/06/2024         Most recent imaging studies revealed   XR R Femur 4/1  IMPRESSION:  Impression:   Displaced comminuted intertrochanteric right femur fracture.     The above laboratory data have been reviewed.      The above imaging data have been reviewed.      The above medical testing have been reviewed.      Body mass index is 27.26 kg/m².     Barriers to Discharge (AKA Impairments): Decreased right hip ROM, right lower extremity weakness/pain limitation, impaired balance, decreased functional endurance, gait instability limiting independence with mobility and self care.      Disposition: Home     Prognosis: Fair     Rehabilitation goals: To return patient to home setting at their prior level of function.      This patient's IRF admission is reasonable and necessary to optimize their medical status, maximize their functional improvement, and ensure a maximally safe discharge.     POST ADMISSION PHYSICIAN EVALUATION  The patient has agreed to being admitted to our comprehensive inpatient  rehabilitation facility consisting of at least 180 minutes of therapy a day,  5 out of 7 days a week.     The patient/family has a good understanding of our

## 2024-04-09 NOTE — DISCHARGE SUMMARY
space height loss, marginal osteophytosis, facet and uncovertebral hypertrophy. Mild to moderate spinal canal stenosis at C6-7 due to prominent disc osteophyte complex. Moderate to severe multilevel neural foraminal narrowing, most advanced at C4-5 and C6-7. No paraspinal soft tissue abnormality.     CT brain: No acute intracranial hemorrhage or mass effect. No calvarial fracture. CT cervical spine: No acute vertebral fracture or traumatic subluxation.  Electronically signed by Tirso Luo MD    XR CHEST PORTABLE    Result Date: 4/1/2024  EXAM: XR CHEST PORTABLE INDICATION: 83 years Female; \"hip fx\" COMPARISON: None FINDINGS: Enlarged cardiac silhouette. No consolidation, pleural effusion, or pneumothorax. No acute osseous abnormality.     Mildly enlarged cardiac silhouette. Electronically signed by Tirso Luo MD    XR FEMUR RIGHT (MIN 2 VIEWS)    Result Date: 4/1/2024  Exam: XR FEMUR RIGHT (MIN 2 VIEWS) History: 83 years Female; \"fall hip and thigh pain\". Comparison: None available Findings: Comminuted displaced intertrochanteric right femur fracture. No dislocation. No osseous erosion. No significant soft tissue abnormality. Proximal tibial fixation hardware.     Impression: Displaced comminuted intertrochanteric right femur fracture. Electronically signed by Tirso Luo MD      The patient was seen and examined on day of discharge and this discharge summary is in conjunction with any daily progress note from day of discharge.Time Spent on discharge is 35 minutes in the examination, evaluation, counseling and review of medications and discharge plan.      Note that more than 30 minutes was spent in preparing discharge papers, discussing discharge with patient, medication review, etc.       Signed:    Kashmir Queen MD   4/8/2024      Thank you Denny Jensen for the opportunity to be involved in this patient's care. If you have any questions or concerns please feel free to contact me at [facility], [facility]

## 2024-04-09 NOTE — FLOWSHEET NOTE
Redness to heels and left elbow charted.  Per nurse UJ, skin is intact. Today redness to heels resolved.  Will continue to use no sting barrier film and pressure relieving measures.    MARCELO VILLARREALN, RN, CWOCN  Inpatient  Wound/Ostomy Care  [phone]        04/08/24 6959   Assessment   Charting Type Shift assessment   Skin Integumentary    Skin Integumentary (WDL) X   Skin Color Ecchymosis (comment)  (scattered, BUE, RLE)   Skin Condition/Temp Warm   Skin Integrity Incision (see LDA)   Location right hip   Multiple Skin Integrity Sites Yes   Skin Integrity Site 2   Skin Integrity Location 2 Redness  (nonblanchable, intact)   Location 2 left elbow   Preventative Dressing Yes   Dressing Site Elbow   Date Applied 04/08/24   Assessed this shift Yes   Skin Integrity Site 3   Skin Integrity Location 3 Redness  (right & left heels blanchable, intact; left heel took longer to return to normal color)    Location 3 bilateral heels   Preventative Dressing Yes  (barrier film)   Dressing Site Heel   Date Applied 04/08/24   Assessed this shift Yes   Skin Integrity Site 4   Skin Integrity Location 4 Redness  (blanchable, intact)   Location 4 bilateral buttocks   Preventative Dressing Yes   Dressing Site Sacrum   Date Applied 04/08/24   Assessed this shift Yes   Wound Prevention and Protection Methods   Location of Wound Prevention Buttocks;Coccyx;Elbow;Generalized;Heel;Sacrum   Dressing Present  Yes   Skin Assessed Underneath Dressing This Shift Yes   Wound Offloading (Prevention Methods) Elevate heels;Foam silicone;Pillows;Repositioning;Turning

## 2024-04-09 NOTE — PROGRESS NOTES
Nutrition Note    RECOMMENDATIONS  PO Diet: Regular  ONS: Ensure BID     ASSESSMENT   Nutrition intervention triggered for new ARU admission. Pt has increased nutrient needs d/t recent hip surgery.  Wt is stable.  Po intake has varied 26-75% of meals.  Also receives Ensure BID to promote adequate intake & post op healing.  Pt reports she is drinking Ensure, but not consistently.  Agrees to con't receiving until po intake consistently greater than 50% of meals.       Malnutrition Status: No malnutrition  Acute Illness    NUTRITION DIAGNOSIS   Increased nutrient needs related to increase demand for energy/nutrients as evidenced by wounds (surgical)    Goals: PO intake 50% or greater     NUTRITION RELATED FINDINGS  Objective: LBM 4/8; nonpitting edema: generalized, BUE, LLE; +2 edema RLE  Wounds: Surgical Incision    CURRENT NUTRITION THERAPIES  ADULT DIET; Regular  ADULT ORAL NUTRITION SUPPLEMENT; Breakfast, Dinner; Standard High Calorie/High Protein Oral Supplement     PO Intake: 26-50%, 51-75%   PO Supplement Intake:26-50%, %    ANTHROPOMETRICS  Current Height: 163.8 cm (5' 4.5\")  Current Weight - Scale: 75.3 kg (166 lb 1.6 oz)    Ideal Body Weight (IBW): 123 lbs  (56 kg)        BMI: 28.1      COMPARATIVE STANDARDS  Total Energy Requirements (kcals/day): 9093-8446     Protein (g):   g       Fluid (mL/day):  1 ml/kcal    EDUCATION  Education not indicated     The patient will be monitored per nutrition standards of care. Consult dietitian if additional nutrition interventions are needed prior to RD reassessment.     Aurora Gotti, KELSEY, LD    Contact: [phone]

## 2024-04-09 NOTE — PROGRESS NOTES
4 Eyes Skin Assessment     NAME:  Charline Renner  YOB: 1940  MEDICAL RECORD NUMBER:  [id number]    The patient is being assessed for  Admission    I agree that at least one RN has performed a thorough Head to Toe Skin Assessment on the patient. ALL assessment sites listed below have been assessed.      Areas assessed by both nurses:    Head, Face, Ears, Shoulders, Back, Chest, Arms, Elbows, Hands, Sacrum. Buttock, Coccyx, Ischium, Legs. Feet and Heels, and Under Medical Devices         Does the Patient have a Wound? Yes wound(s) were present on assessment. LDA wound assessment was Initiated and completed by RN. Silver dressing over right lateral upper hip incision and right lateral mid thigh incision. Foam Mepilex over right lateral lower distal thigh incision. Nonblanchable redness to left elbow. Blanchable redness to bilateral buttocks. Blanchable redness to bilateral heels; left heel takes longer for color to return to site. Ecchymosis to bilateral arms and right leg.       Sherif Prevention initiated by RN: Yes  Wound Care Orders initiated by RN: No    Pressure Injury (Stage 3,4, Unstageable, DTI, NWPT, and Complex wounds) if present, place Wound referral order by RN under : No    New Ostomies, if present place, Ostomy referral order under : No     Nurse 1 eSignature: Electronically signed by Naomi Victor RN on 4/9/24 at 4:40 AM EDT    **SHARE this note so that the co-signing nurse can place an eSignature**    Nurse 2 eSignature: Electronically signed by Shannon Garcia RN on 4/9/24 at 8:30 AM EDT

## 2024-04-09 NOTE — PLAN OF CARE
ARU PATIENT TREATMENT PLAN  [facility]  [address] [place] [address]  [phone]      Charline Renner    : 1940  [id number]  MRN: [id number]  PHYSICIAN:  Lucia Deleon MD  Primary Problem    Patient Active Problem List   Diagnosis    Intertrochanteric fracture of right femur, closed, initial encounter ([facility])    Atrial fibrillation with RVR ([facility])    Closed fracture of femur, intertrochanteric, right, sequela    History of gastrointestinal bleeding    Coronary artery disease involving native coronary artery of native heart without angina pectoris    Elevated troponin    Closed right hip fracture, sequela       Rehabilitation Diagnosis:      #. R intertrochanteric femur fracture  - s/p IMN on 4/3  - WBAT  - pain control, as below     #. HTN  - Metoprolol  mg daily     #. Afib  - Not on anticoagulation due to prior GI bleed  - BB, as above       #. HLD  - Pravastatin    ADMIT DATE:2024    Patient Goals: To return home independently and be able to go up stairs.  Admitting Impairments: Orthopedic Disorders - 8.11 - Unilateral Hip Fracture  Activities: Impaired Eating, Hygiene, Toileting, Bathing, Dressing, Bed Mobility, Transfers, Ambulation, Stairs, and Endurance.  Participation: Prior to admission patient was living at home with significant other, was independent with all mobility and activities, was not an active .     CARE PLAN     NURSING:  Charline Renner while on this unit will:  [] Be continent of bowel and bladder     [x] Have an adequate number of bowel movements  [] Urinate with no urinary retention >300ml in bladder  [] Complete bladder protocol with harmon removal  [x] Maintain O2 SATs at 90%  [x] Have pain managed while on ARU       [] Be pain free by discharge   [x] Have no skin breakdown while on ARU  [] Have improved skin integrity via wound measurements  [x] Have no signs/symptoms of infection at the wound site  [x] Be free from injury during  Prognosis: [] Good  [x] Fair    [] Guarded   Total expected IRF days: 14  Anticipated discharge destination: Home  [x] Home Independently   [] Home with supervision    []SNF     [] Other                                           Physician anticipated functional outcomes:  By discharge, the patient will progress to being independent with all mobility and activities.     IPOC brief synthesis: 83 y.o. female with PMHx notable for Afib (not on anticoagulation),  macular degeneration (legally blind) who was admitted to [facility] on 3/1 after mechanical fall with right hip fracture. Ortho was consulted, and she underwent IM nail placement on 4/3. Course complicated by Afib w/ RVR. Cardiology had recommend consideration of Watchman device.  Patient was admitted to our rehabilitation unit on 4/5, and the next day she developed a rapid ventricular rate and was found to be in A-fib, and was discharged off a rehabilitation to the acute floor and seen by cardiology.  Patient noted to have paroxysmal atrial fibrillation, and spontaneously converted back to sinus rhythm.  No oral anticoagulation was recommended due to her history of falls and GI bleeding.  She will continue with aspirin treatment.  She will continue on oral amiodarone to help stabilize her rhythm.  Patient will follow-up with her cardiologist at [facility] after discharge.  Patient was felt stable, she is now admitted back to a rehabilitation unit for treatment of her right hip fracture and improving her independence and self-care, ambulation, and safety before discharge to home.  Repeat labs yesterday look good and are stable.       I have reviewed this initial plan of care and agree with its contents:    Title   Name    Date    Time    Physician: Dr. Ivy, 4/10/24, 11 AM    Case Mgmt: ABHISHEK Winkler, LSW 4/9/2024 @8:51AM    OT: JOE Berger OTR/L 4/9/24 1595     PT: Katie Howe PT, DPT, [id number] 4/9/2024 @ 2813    RN: MARCELO AmbrocioN, RN

## 2024-04-09 NOTE — PLAN OF CARE
Problem: Safety - Adult  Goal: Free from fall injury  Outcome: Progressing     Problem: Pain  Goal: Verbalizes/displays adequate comfort level or baseline comfort level  Outcome: Progressing  Flowsheets  Taken 4/8/2024 2015 by Macrina Callejas RN  Verbalizes/displays adequate comfort level or baseline comfort level:   Encourage patient to monitor pain and request assistance   Assess pain using appropriate pain scale   Administer analgesics based on type and severity of pain and evaluate response   Implement non-pharmacological measures as appropriate and evaluate response  Taken 4/8/2024 1545 by Macrina Callejas RN  Verbalizes/displays adequate comfort level or baseline comfort level:   Encourage patient to monitor pain and request assistance   Assess pain using appropriate pain scale   Administer analgesics based on type and severity of pain and evaluate response   Implement non-pharmacological measures as appropriate and evaluate response     Problem: ABCDS Injury Assessment  Goal: Absence of physical injury  Outcome: Progressing  Flowsheets (Taken 4/8/2024 2231)  Absence of Physical Injury: Implement safety measures based on patient assessment     Problem: Skin/Tissue Integrity  Goal: Absence of new skin breakdown  Description: 1.  Monitor for areas of redness and/or skin breakdown  2.  Assess vascular access sites hourly  3.  Every 4-6 hours minimum:  Change oxygen saturation probe site  4.  Every 4-6 hours:  If on nasal continuous positive airway pressure, respiratory therapy assess nares and determine need for appliance change or resting period.  Outcome: Progressing

## 2024-04-09 NOTE — ACP (ADVANCE CARE PLANNING)
Advanced Care Planning Note.    Purpose of Encounter: Advanced care planning in light of atrial fibrillation  Parties In Attendance: Patient  Decisional Capacity: Yes  Subjective: Patient denies CP and SOB  Objective: Cr < 0.5  Goals of Care Determination: Patient wants full support (CPR, vent, surgery, HD, trach, PEG)  Plan:  PT/OT.  Cardio and PMR consult.  ARU.    Code Status: Full code   Time spent on Advanced care Plannin minutes  Advanced Care Planning Documents: Completed advanced directives on chart, son is the POA.    Kashmir Queen MD  2024 10:58 PM

## 2024-04-09 NOTE — PROGRESS NOTES
[facility] - Inpatient Rehabilitation Department   Phone: [phone]    Occupational Therapy    [x] Initial Evaluation            [] Daily Treatment Note         [] Discharge Summary      Patient: Charline Renner   : 1940   MRN: [id number]   Date of Service:  2024    Admitting Diagnosis:  Closed right hip fracture, sequela  Current Admission Summary:   Charline Renner is a 83 y.o. female with PMHx notable for Afib (not on anticoagulation),  macular degeneration (legally blind) who was admitted to [facility] on 3/1 after mechanical fall with right hip fracture. Ortho was consulted, and she underwent IM nail placement on 4/3. Course complicated by Afib w/ RVR. Cardiology had recommend consideration of Watchman device.  Patient was admitted to our rehabilitation unit on , and the next day she developed a rapid ventricular rate and was found to be in A-fib, and was discharged off a rehabilitation to the acute floor and seen by cardiology.  Patient noted to have paroxysmal atrial fibrillation, and spontaneously converted back to sinus rhythm.  No oral anticoagulation was recommended due to her history of falls and GI bleeding.  She will continue with aspirin treatment.  She will continue on oral amiodarone to help stabilize her rhythm.  Patient will follow-up with her cardiologist at [facility] after discharge.  Patient was felt stable, she is now admitted back to a rehabilitation unit for treatment of her right hip fracture and improving her independence and self-care, ambulation, and safety before discharge to home.  Repeat labs yesterday look good and are stable   Past Medical History:  has a past medical history of A-fib (HCC), Hyperlipidemia, Hypertension, and Macular degeneration.  Past Surgical History:  has a past surgical history that includes hip surgery (Right, 4/3/2024).    Discharge Recommendations: Home with HHOT     DME Required For Discharge: DME to be determined pending  bar and RW   Comments: from EOB to RW. To/from toilet, TTB and wc. Patient completed stand step from shower to wc and wc to recliner due to time constraints and fatigue from shower   Functional Mobility  Functional Mobility Activity: 12 ft from EOB to toilet + 5 ft to TTB  Device Use: rolling walker  Required Assistance: minimal assistance  Comment: heavy reliance on BUE  Balance:  Static Sitting Balance: fair (+): maintains balance at SBA/supervision without use of UE support  Dynamic Sitting Balance: fair (+): maintains balance at SBA/supervision without use of UE support  Static Standing Balance: fair (-): maintains balance at CGA with use of UE support  Dynamic Standing Balance: poor (+): requires min (A) to maintain balance  Comments:    Other Therapeutic Interventions      Second Session:  Patient in recliner upon arrival, pleasant and agreeable to OT session. Reports that pain is manageable, but does not rate.     Pt STS with RW Myles. Pt completed mobility with RW CGA from recliner to toilet ~15ft. Pt voided urine in toilet- maxA for pericare and clothing mgmt. ModA for STS from bariatric BSC overtop toilet due to height. Replaced bariatric BSC with standard BSC and adjusted height at EOS.    SpO2 80-83% RA- questionable reading. Asymptomatic. Placed pt on 2L O2 and applied forehead SpO2 sensor. Reading % on 2L with forehead sensor. Decreased O2 gradually and eventually eliminated. Pt remaining 95-98% RA with forehead sensor. Increased time taken to ensure patient safety.    Patient completed oral care in stance at sink with RW CGA. Pt able to complete oral care without assist. Able to stand ~3 minutes before requiring seated rest break.     Patient completed mobility with RW CGA from toilet to recliner ~15ft.     Patient left in recliner, chair alarm on, call light within reach, and all needs met.    Cognition  WFL  Orientation:    alert and oriented x 4  Command Following:

## 2024-04-09 NOTE — PROGRESS NOTES
Assessment complete. Alert, oriented x4. Denies pain at rest. Pain appears to increase with movement. Old drainage noted, circled on silver dressing, presumed to be over right lateral upper hip and right lateral thigh incisions. Per note from 5C RN and report from day shift ARU RN, NP did not want these dressings touched/removed. Assisted to bathroom using stedy. Continent of bowel and bladder. The care plan and education has been reviewed and mutually agreed upon with the patient. In bed, alarm on, bed in lowest position, call light and table within reach. No further needs expressed at this time.

## 2024-04-09 NOTE — PROGRESS NOTES
[facility] - Inpatient Rehabilitation Department  Phone: [phone]    Physical Therapy    [x] Initial Evaluation            [] Daily Treatment Note         [] Discharge Summary      Patient: Charline Renner   : 1940   MRN: [id number]   Date of Service:  2024  Admitting Diagnosis: Closed right hip fracture, sequela  Current Admission Summary: Charline Renner is a 83 y.o. female with PMHx notable for Afib (not on anticoagulation),  macular degeneration (legally blind) who was admitted to [facility] on 3/1 after mechanical fall with right hip fracture. Ortho was consulted, and she underwent IM nail placement on 4/3. Course complicated by Afib w/ RVR. Cardiology had recommend consideration of Watchman device.  Patient was admitted to our rehabilitation unit on , and the next day she developed a rapid ventricular rate and was found to be in A-fib, and was discharged off a rehabilitation to the acute floor and seen by cardiology.  Patient noted to have paroxysmal atrial fibrillation, and spontaneously converted back to sinus rhythm.  No oral anticoagulation was recommended due to her history of falls and GI bleeding.  She will continue with aspirin treatment.  She will continue on oral amiodarone to help stabilize her rhythm.  Patient will follow-up with her cardiologist at [facility] after discharge.  Patient was felt stable, she is now admitted back to a rehabilitation unit for treatment of her right hip fracture and improving her independence and self-care, ambulation, and safety before discharge to home.    Past Medical History:  has a past medical history of A-fib (HCC), Hyperlipidemia, Hypertension, and Macular degeneration.  Past Surgical History:  has a past surgical history that includes hip surgery (Right, 4/3/2024).  Discharge Recommendations: Home with home health therapy, family assist PRN  DME Required For Discharge: rolling walker  Precautions/Restrictions: high fall risk, up

## 2024-04-09 NOTE — PLAN OF CARE
Problem: Discharge Planning  Goal: Discharge to home or other facility with appropriate resources  Outcome: Progressing  Flowsheets  Taken 4/8/2024 2320 by Naomi Victor RN  Discharge to home or other facility with appropriate resources: Identify discharge learning needs (meds, wound care, etc)  Taken 4/8/2024 2231 by Naomi Victor RN  Discharge to home or other facility with appropriate resources: Identify discharge learning needs (meds, wound care, etc)     Problem: Safety - Adult  Goal: Free from fall injury  4/9/2024 1020 by Jeny Shultz RN  Outcome: Progressing     Problem: Pain  Goal: Verbalizes/displays adequate comfort level or baseline comfort level  4/9/2024 1020 by Jeny Shultz RN  Outcome: Progressing     Problem: ABCDS Injury Assessment  Goal: Absence of physical injury  4/9/2024 1020 by Jeny Shultz RN  Outcome: Progressing     Problem: Skin/Tissue Integrity  Goal: Absence of new skin breakdown  Description: 1.  Monitor for areas of redness and/or skin breakdown  2.  Assess vascular access sites hourly  3.  Every 4-6 hours minimum:  Change oxygen saturation probe site  4.  Every 4-6 hours:  If on nasal continuous positive airway pressure, respiratory therapy assess nares and determine need for appliance change or resting period.  4/9/2024 1020 by Jeny Shultz RN  Outcome: Progressing

## 2024-04-10 LAB
ANION GAP SERPL CALCULATED.3IONS-SCNC: 9 MMOL/L (ref 3–16)
BASOPHILS # BLD: 0 K/UL (ref 0–0.2)
BASOPHILS NFR BLD: 0.5 %
BUN SERPL-MCNC: 18 MG/DL (ref 7–20)
CALCIUM SERPL-MCNC: 8.6 MG/DL (ref 8.3–10.6)
CHLORIDE SERPL-SCNC: 103 MMOL/L (ref 99–110)
CO2 SERPL-SCNC: 27 MMOL/L (ref 21–32)
CREAT SERPL-MCNC: <0.5 MG/DL (ref 0.6–1.2)
DEPRECATED RDW RBC AUTO: 13.5 % (ref 12.4–15.4)
EOSINOPHIL # BLD: 0.3 K/UL (ref 0–0.6)
EOSINOPHIL NFR BLD: 2.8 %
GFR SERPLBLD CREATININE-BSD FMLA CKD-EPI: >90 ML/MIN/{1.73_M2}
GLUCOSE SERPL-MCNC: 98 MG/DL (ref 70–99)
HCT VFR BLD AUTO: 30.2 % (ref 36–48)
HGB BLD-MCNC: 10 G/DL (ref 12–16)
LYMPHOCYTES # BLD: 2 K/UL (ref 1–5.1)
LYMPHOCYTES NFR BLD: 21.8 %
MCH RBC QN AUTO: 32.1 PG (ref 26–34)
MCHC RBC AUTO-ENTMCNC: 33 G/DL (ref 31–36)
MCV RBC AUTO: 97.3 FL (ref 80–100)
MONOCYTES # BLD: 0.9 K/UL (ref 0–1.3)
MONOCYTES NFR BLD: 9.9 %
NEUTROPHILS # BLD: 6.1 K/UL (ref 1.7–7.7)
NEUTROPHILS NFR BLD: 65 %
PLATELET # BLD AUTO: 226 K/UL (ref 135–450)
PMV BLD AUTO: 9.3 FL (ref 5–10.5)
POTASSIUM SERPL-SCNC: 4.3 MMOL/L (ref 3.5–5.1)
RBC # BLD AUTO: 3.1 M/UL (ref 4–5.2)
SODIUM SERPL-SCNC: 139 MMOL/L (ref 136–145)
WBC # BLD AUTO: 9.4 K/UL (ref 4–11)

## 2024-04-10 PROCEDURE — 97530 THERAPEUTIC ACTIVITIES: CPT

## 2024-04-10 PROCEDURE — 6370000000 HC RX 637 (ALT 250 FOR IP): Performed by: STUDENT IN AN ORGANIZED HEALTH CARE EDUCATION/TRAINING PROGRAM

## 2024-04-10 PROCEDURE — 97110 THERAPEUTIC EXERCISES: CPT

## 2024-04-10 PROCEDURE — 85025 COMPLETE CBC W/AUTO DIFF WBC: CPT

## 2024-04-10 PROCEDURE — 6360000002 HC RX W HCPCS: Performed by: STUDENT IN AN ORGANIZED HEALTH CARE EDUCATION/TRAINING PROGRAM

## 2024-04-10 PROCEDURE — 80048 BASIC METABOLIC PNL TOTAL CA: CPT

## 2024-04-10 PROCEDURE — 2580000003 HC RX 258: Performed by: STUDENT IN AN ORGANIZED HEALTH CARE EDUCATION/TRAINING PROGRAM

## 2024-04-10 PROCEDURE — 97116 GAIT TRAINING THERAPY: CPT

## 2024-04-10 PROCEDURE — 1280000000 HC REHAB R&B

## 2024-04-10 PROCEDURE — 97535 SELF CARE MNGMENT TRAINING: CPT

## 2024-04-10 PROCEDURE — 36415 COLL VENOUS BLD VENIPUNCTURE: CPT

## 2024-04-10 RX ORDER — OXYCODONE HYDROCHLORIDE 5 MG/1
5 TABLET ORAL EVERY 4 HOURS PRN
Status: DISCONTINUED | OUTPATIENT
Start: 2024-04-10 | End: 2024-04-19 | Stop reason: HOSPADM

## 2024-04-10 RX ADMIN — ENOXAPARIN SODIUM 40 MG: 100 INJECTION SUBCUTANEOUS at 07:33

## 2024-04-10 RX ADMIN — METOPROLOL TARTRATE 100 MG: 50 TABLET, FILM COATED ORAL at 21:51

## 2024-04-10 RX ADMIN — AMIODARONE HYDROCHLORIDE 200 MG: 200 TABLET ORAL at 07:33

## 2024-04-10 RX ADMIN — THERA TABS 1 TABLET: TAB at 07:33

## 2024-04-10 RX ADMIN — Medication 10 ML: at 11:11

## 2024-04-10 RX ADMIN — METOPROLOL TARTRATE 100 MG: 50 TABLET, FILM COATED ORAL at 07:33

## 2024-04-10 RX ADMIN — POLYETHYLENE GLYCOL 3350 17 G: 17 POWDER, FOR SOLUTION ORAL at 07:33

## 2024-04-10 RX ADMIN — ACETAMINOPHEN 650 MG: 325 TABLET ORAL at 14:41

## 2024-04-10 RX ADMIN — OXYCODONE 5 MG: 5 TABLET ORAL at 14:41

## 2024-04-10 RX ADMIN — ASPIRIN 81 MG 81 MG: 81 TABLET ORAL at 07:33

## 2024-04-10 RX ADMIN — PRAVASTATIN SODIUM 40 MG: 40 TABLET ORAL at 21:51

## 2024-04-10 RX ADMIN — OXYCODONE 5 MG: 5 TABLET ORAL at 05:53

## 2024-04-10 RX ADMIN — Medication 400 UNITS: at 11:11

## 2024-04-10 RX ADMIN — Medication 10 ML: at 21:52

## 2024-04-10 ASSESSMENT — PAIN DESCRIPTION - LOCATION
LOCATION: HIP
LOCATION: HIP;LEG

## 2024-04-10 ASSESSMENT — PAIN - FUNCTIONAL ASSESSMENT: PAIN_FUNCTIONAL_ASSESSMENT: ACTIVITIES ARE NOT PREVENTED

## 2024-04-10 ASSESSMENT — PAIN DESCRIPTION - DESCRIPTORS
DESCRIPTORS: ACHING
DESCRIPTORS: ACHING

## 2024-04-10 ASSESSMENT — PAIN SCALES - GENERAL
PAINLEVEL_OUTOF10: 6
PAINLEVEL_OUTOF10: 2
PAINLEVEL_OUTOF10: 7
PAINLEVEL_OUTOF10: 0

## 2024-04-10 ASSESSMENT — PAIN DESCRIPTION - ORIENTATION
ORIENTATION: RIGHT
ORIENTATION: RIGHT

## 2024-04-10 ASSESSMENT — PAIN SCALES - WONG BAKER: WONGBAKER_NUMERICALRESPONSE: NO HURT

## 2024-04-10 NOTE — PATIENT CARE CONFERENCE
[facility]  Inpatient Rehabilitation  Weekly Team Conference Note    Patient Name: Charline Renner        MRN: [id number]    : 1940  (83 y.o.)  Gender: female           The team conference for this patient was held on 2024 at 11am and led by:  Ravi Ivy MD     CASE MANAGEMENT:  Assessment: Charline Renner is a 83 female that has been admitted to ARU. Prior to admission the patient was living with her significant other. The patient anticipates to discharge home with home health care and any other needed supports. The SW will continue to follow and update the discharge plan as needed    PHYSICAL THERAPY:    Bed Mobility:     Sit to supine: minimal assistance  Transfers:  Sit to stand transfer: contact guard assistance  Stand to sit transfer: contact guard assistance  Stand step transfer: minimal assistance, (w/c <=> seated stepper)  Comments: All transfers completed with use of RW.  Increased time to rise and reposition (R) LE prior to moving DANGELO.  Ambulation:  Surface:level surface  Assistive Device: rolling walker  Assistance: minimal assistance  Distance: 80 ft + 20 ft  Gait Mechanics: Partial reciprocal pattern with heavy reliance on UE support during reduced (R) SLS phase.  Reduced elena and (B) LE foot clearance and stride length.  Comments:    Stair Mobility:  Number of Steps: 3  Step Height: 6 inch  Hand Rails: (B) handrail  Assistance: minimal assistance  Comments: Step to mechanics with VC for sequence  Balance:  Static Sitting Balance: fair (+): maintains balance at SBA/supervision without use of UE support  Dynamic Sitting Balance: fair (+): maintains balance at SBA/supervision without use of UE support  Static Standing Balance: fair (-): maintains balance at CGA with use of UE support  Dynamic Standing Balance: poor (+): requires min (A) to maintain balance    QM:  Roll Left and Right  Assistance Needed: Substantial/maximal assistance  Comment: Unable to roll to the (R)

## 2024-04-10 NOTE — CARE COORDINATION
Social Work Admission Assessment    Objective:  Met with pt to complete initial assessment and review role of  in rehab process.  Pt oriented to unit.  Pt states understanding of this.     Current Home Situation:  Patient prior to admission was living with her significant other but reports he has dementia. The patient stated since she has been in the hospital the patient significant other daughter has but him in a Memory Care Assisted Living. The patient reports she has goo family support from her children.     Pt's plans are: Retired  from [employer]     Accessibility to community resources/transportation: The patient reports to be active with [employer] and receives meals  wheels. The patient is not a active .      Has pt experienced a recent loss or signigicant life event that would impact their care or ability to participate?  Denied    Has pt ever been treated for emotional disorders?  No    How does pt and family cope with stressful events and this hospitalization?  The patient enjoys to Listen to books, Watch TV    Special Problem Areas: None     Discharge Plan:Home with Home Health Care    Impression/Plan: Charline Renner is a 83 female that has been admitted to ARU. Provided patient with this SW's card to contact as needed. Will continue to follow for support and discharge planning.

## 2024-04-10 NOTE — PLAN OF CARE
Problem: Discharge Planning  Goal: Discharge to home or other facility with appropriate resources  4/9/2024 2134 by Alisha Orosco, RN  Outcome: Progressing     Problem: Safety - Adult  Goal: Free from fall injury  4/9/2024 2134 by Alisha Orosco, RN  Outcome: Progressing     Problem: Pain  Goal: Verbalizes/displays adequate comfort level or baseline comfort level  4/9/2024 2134 by Alisha Orosco, RN  Outcome: Progressing  Flowsheets (Taken 4/9/2024 1945)  Verbalizes/displays adequate comfort level or baseline comfort level: Encourage patient to monitor pain and request assistance     Problem: ABCDS Injury Assessment  Goal: Absence of physical injury  4/9/2024 2134 by Alisha Orosco, RN  Outcome: Progressing     Problem: Skin/Tissue Integrity  Goal: Absence of new skin breakdown  4/9/2024 2134 by Alisha Orosco, RN  Outcome: Progressing

## 2024-04-10 NOTE — PROGRESS NOTES
[facility] - Inpatient Rehabilitation Department   Phone: [phone]    Occupational Therapy    [] Initial Evaluation            [x] Daily Treatment Note         [] Discharge Summary      Patient: Charline Renner   : 1940   MRN: [id number]   Date of Service:  4/10/2024    Admitting Diagnosis:  Closed right hip fracture, sequela  Current Admission Summary:   Charline Renner is a 83 y.o. female with PMHx notable for Afib (not on anticoagulation),  macular degeneration (legally blind) who was admitted to [facility] on 3/1 after mechanical fall with right hip fracture. Ortho was consulted, and she underwent IM nail placement on 4/3. Course complicated by Afib w/ RVR. Cardiology had recommend consideration of Watchman device.  Patient was admitted to our rehabilitation unit on , and the next day she developed a rapid ventricular rate and was found to be in A-fib, and was discharged off a rehabilitation to the acute floor and seen by cardiology.  Patient noted to have paroxysmal atrial fibrillation, and spontaneously converted back to sinus rhythm.  No oral anticoagulation was recommended due to her history of falls and GI bleeding.  She will continue with aspirin treatment.  She will continue on oral amiodarone to help stabilize her rhythm.  Patient will follow-up with her cardiologist at [facility] after discharge.  Patient was felt stable, she is now admitted back to a rehabilitation unit for treatment of her right hip fracture and improving her independence and self-care, ambulation, and safety before discharge to home.  Repeat labs yesterday look good and are stable   Past Medical History:  has a past medical history of A-fib (HCC), Hyperlipidemia, Hypertension, and Macular degeneration.  Past Surgical History:  has a past surgical history that includes hip surgery (Right, 4/3/2024).    Discharge Recommendations: Home with HHOT     DME Required For Discharge: DME to be determined pending  and patient at risk for falls    Plan  Frequency: 5 x/week, 90 min/day  Current Treatment Recommendations: strengthening, balance training, functional mobility training, transfer training, endurance training, patient/caregiver education, ADL/self-care training, IADL training, and equipment evaluation/education    Goals  Patient Goals: \"to be able to go up steps\"   Short Term Goals:  Time Frame: 2 weeks   Patient will complete upper body ADL at [facility] independent   Patient will complete lower body ADL at [facility]   Patient will complete toileting at modified independent   Patient will complete functional transfers at [facility]   Patient to gather and transport items at modified independent     Above goals reviewed on 4/10/2024.  All goals are ongoing at this time unless indicated above.       Therapy Session Time     Individual Group Co-treatment   Time In 0730      Time Out 0836      Minutes 66        Second Session    Individual Group Co-treatment   Time In 1326      Time Out 1400      Minutes 34        Timed Code Treatment Minutes: 66+34 minutes   Total Treatment Minutes: 100 minutes      JOE Serrano OTR/L, [id number] 4/10/2024 3:02 PM

## 2024-04-10 NOTE — PROGRESS NOTES
[facility] - Inpatient Rehabilitation Department  Phone: [phone]    Physical Therapy    [] Initial Evaluation            [x] Daily Treatment Note         [] Discharge Summary      Patient: Charline Renner   : 1940   MRN: [id number]   Date of Service:  4/10/2024  Admitting Diagnosis: Closed right hip fracture, sequela  Current Admission Summary: Charline Renner is a 83 y.o. female with PMHx notable for Afib (not on anticoagulation),  macular degeneration (legally blind) who was admitted to [facility] on 3/1 after mechanical fall with right hip fracture. Ortho was consulted, and she underwent IM nail placement on 4/3. Course complicated by Afib w/ RVR. Cardiology had recommend consideration of Watchman device.  Patient was admitted to our rehabilitation unit on , and the next day she developed a rapid ventricular rate and was found to be in A-fib, and was discharged off a rehabilitation to the acute floor and seen by cardiology.  Patient noted to have paroxysmal atrial fibrillation, and spontaneously converted back to sinus rhythm.  No oral anticoagulation was recommended due to her history of falls and GI bleeding.  She will continue with aspirin treatment.  She will continue on oral amiodarone to help stabilize her rhythm.  Patient will follow-up with her cardiologist at [facility] after discharge.  Patient was felt stable, she is now admitted back to a rehabilitation unit for treatment of her right hip fracture and improving her independence and self-care, ambulation, and safety before discharge to home.    Past Medical History:  has a past medical history of A-fib (HCC), Hyperlipidemia, Hypertension, and Macular degeneration.  Past Surgical History:  has a past surgical history that includes hip surgery (Right, 4/3/2024).  Discharge Recommendations: Home with home health therapy, family assist PRN  DME Required For Discharge: rolling walker  Precautions/Restrictions: high fall risk, up  Following:   WFL    Education  Barriers To Learning: visual  Patient Education: patient educated on goals, PT role and benefits, plan of care, weight-bearing education, general safety, functional mobility training, proper use of assistive device/equipment, pressure relief, transfer training  Learning Assessment:  patient verbalizes and demonstrates understanding    Assessment  Activity Tolerance: Appropriate rest breaks due to pain with mobility.  No significant SOB during completion of session on room air.  SpO2 94% both at rest and with activity.  Impairments Requiring Therapeutic Intervention: decreased functional mobility, decreased ADL status, decreased ROM, decreased strength, decreased endurance, decreased balance, increased pain, decreased posture  Prognosis: good  Clinical Assessment: Patient presenting with significant improvement in functional mobility including improved ability to achieve and maintain standing position with increased WB acceptance through the involved LE.  Patient pattern and ambulation distance significantly improved on this date.  Pt would benefit from skilled PT services in attempt to regain baseline functional independence to allow return to home.   Safety Interventions: patient left in chair, chair alarm in place, call light within reach, gait belt, and nurse notified    Plan  Frequency: 5 x/week, 90 min/day  Current Treatment Recommendations: strengthening, ROM, balance training, functional mobility training, transfer training, gait training, stair training, endurance training, modalities, patient/caregiver education, ADL/self-care training, pain management, home exercise program, and safety education    Goals  Patient Goals: To return home independently   Short Term Goals:  Time Frame: 2 weeks  Patient will complete bed mobility at modified independent   Patient will complete transfers at modified independent   Patient will ambulate 150 ft with use of rolling walker at modified  independent  Patient will ascend/descend 4 stairs with unilateral (L) HR at modified independent  Patient will ascend/descend 13 stairs with unilateral (R) HR at modified independent    Above goals reviewed on 4/10/2024.  All goals are ongoing at this time unless indicated above.      Therapy Session Time      Individual Group Co-treatment   Time In 0930       Time Out 1015       Minutes 45            Second Session Therapy Time:   Individual Concurrent Group Co-treatment   Time In 1436         Time Out 1522         Minutes 46           Timed Code Treatment Minutes:  91    Total Treatment Minutes:  91        First Session Completed By: Marshall Fischer PT, DPT - [id number], 4/10/2024 12:44 PM    Second Session Completed By: Nae Barba PT, DPT [id number]

## 2024-04-10 NOTE — PROGRESS NOTES
Charline Renner  4/10/2024  [id number]    Chief Complaint: Closed right hip fracture, sequela    Subjective:   Patient seen this AM. Patient working in therapies to improve her transfers and gait.  Patient's hip pain is under control with her current medication..  Repeat labs this morning look good and are stable.        Last BM: Stool Occurrence: 0 (04/10/24 0546)    Objective:  Patient Vitals for the past 24 hrs:   BP Temp Temp src Pulse Resp SpO2 Height Weight   04/10/24 0732 (!) 125/54 98.2 °F (36.8 °C) Oral 69 16 95 % -- --   04/10/24 0623 -- -- -- -- 16 -- -- --   04/10/24 0553 -- -- -- -- 18 -- -- --   04/10/24 0546 -- -- -- -- -- -- -- 76.6 kg (168 lb 12.8 oz)   04/09/24 1945 139/64 98.6 °F (37 °C) Oral 82 16 93 % -- --   04/09/24 1315 -- -- -- -- -- 96 % -- --   04/09/24 1147 -- -- -- -- -- -- 1.638 m (5' 4.5\") --   04/09/24 1100 -- -- -- 60 -- 94 % -- --     Gen: No distress, pleasant.   HEENT: Normocephalic, atraumatic.   CV: Regular rate and rhythm. Extremities warm, well perfused.   Resp: No respiratory distress. CTAB   Abd: Soft, nontender   Ext: No edema.   Neuro: Alert, oriented, appropriately interactive.     Laboratory data: Available via EMR.     Therapy progress:       PT    Supine to Sit: Substantial/maximal assistance  Sit to Supine: Partial/moderate assistance   Sit to Stand: Partial/moderate assistance  Chair/Bed to Chair Transfer: Partial/moderate assistance  Car Transfer: Partial/moderate assistance  Ambulation 10 ft: Partial/moderate assistance  Ambulation 50 ft:    Ambulation 150 ft:    Stairs - 1 Step: Partial/moderate assistance  Stairs - 4 Step:    Stairs - 12 Step:      OT    Eating: Setup or clean-up assistance  Oral Hygiene: Supervision or touching assistance  Bathing: Partial/moderate assistance  Upper Body Dressing: Supervision or touching assistance  Lower Body Dressing: Substantial/maximal assistance  Toilet Transfer: Partial/moderate assistance  Toilet Hygiene: Substantial/maximal

## 2024-04-10 NOTE — PROGRESS NOTES
Admission Period/Goal QM Codes for Charline Renner.    QM Admit Code Goal Code   Eating 5 6   Oral Hygiene 4 6   Toileting Hygiene 2 6   Shower/Bathing 3 6   UB Dressing 4 6   LB Dressing 2 6   Putting on/off Footwear 1 6   Rolling Left and Right 2 6   Sit To Lying 1 6   Lying to Sitting on Bedside 1 6   Sit to Stand 1 6   Chair/Bed to Chair Transfer 3 6   Toilet Transfers 1 6   Car Transfers 3 6   Walk 10 Feet 3 6   Walk 50 Feet with Two Turns 88 6   Walk 150 Feet 88 6   Walk 10 Feet on Uneven Surfaces 88 6   1 Step (Curb) 3 6   4 Steps 88 6   12 Steps 88 6   Picking up Object from Floor 88 6   Wheel 50 Feet with 2 Turns 9 -   Type - -   Wheel 150 Feet 9 -   Type - -       The above codes were determined by the treatment team to be the patient's accurate admission assessment codes based on assessment performed soon after the patient's admission and prior to the benefit of services provided by staff, or if appropriate, the patient's usual performance at admission.    OT: JOE Serrano OTR/L, [id number] 4/10/2024 2:46 PM     PT:  Marshall Fischer PT, DPT - [id number], 4/11/2024 7:10 AM     RN:  Shania ROBERTSONN, RN 4/10/2024 at 1116am     :  Michael Jimenez PT, DPT [id number]  4/11/24  9:00 AM

## 2024-04-11 LAB
ANION GAP SERPL CALCULATED.3IONS-SCNC: 11 MMOL/L (ref 3–16)
BASOPHILS # BLD: 0.1 K/UL (ref 0–0.2)
BASOPHILS NFR BLD: 0.6 %
BUN SERPL-MCNC: 19 MG/DL (ref 7–20)
CALCIUM SERPL-MCNC: 9 MG/DL (ref 8.3–10.6)
CHLORIDE SERPL-SCNC: 99 MMOL/L (ref 99–110)
CO2 SERPL-SCNC: 27 MMOL/L (ref 21–32)
CREAT SERPL-MCNC: 0.6 MG/DL (ref 0.6–1.2)
DEPRECATED RDW RBC AUTO: 13.6 % (ref 12.4–15.4)
EOSINOPHIL # BLD: 0.2 K/UL (ref 0–0.6)
EOSINOPHIL NFR BLD: 2.8 %
GFR SERPLBLD CREATININE-BSD FMLA CKD-EPI: 87 ML/MIN/{1.73_M2}
GLUCOSE SERPL-MCNC: 106 MG/DL (ref 70–99)
HCT VFR BLD AUTO: 31.6 % (ref 36–48)
HGB BLD-MCNC: 10.3 G/DL (ref 12–16)
LYMPHOCYTES # BLD: 1.4 K/UL (ref 1–5.1)
LYMPHOCYTES NFR BLD: 16.2 %
MCH RBC QN AUTO: 31.8 PG (ref 26–34)
MCHC RBC AUTO-ENTMCNC: 32.6 G/DL (ref 31–36)
MCV RBC AUTO: 97.5 FL (ref 80–100)
MONOCYTES # BLD: 0.8 K/UL (ref 0–1.3)
MONOCYTES NFR BLD: 9 %
NEUTROPHILS # BLD: 6.1 K/UL (ref 1.7–7.7)
NEUTROPHILS NFR BLD: 71.4 %
PLATELET # BLD AUTO: 258 K/UL (ref 135–450)
PMV BLD AUTO: 9.2 FL (ref 5–10.5)
POTASSIUM SERPL-SCNC: 4 MMOL/L (ref 3.5–5.1)
RBC # BLD AUTO: 3.24 M/UL (ref 4–5.2)
SODIUM SERPL-SCNC: 137 MMOL/L (ref 136–145)
WBC # BLD AUTO: 8.6 K/UL (ref 4–11)

## 2024-04-11 PROCEDURE — 97110 THERAPEUTIC EXERCISES: CPT

## 2024-04-11 PROCEDURE — 97535 SELF CARE MNGMENT TRAINING: CPT

## 2024-04-11 PROCEDURE — 6370000000 HC RX 637 (ALT 250 FOR IP): Performed by: STUDENT IN AN ORGANIZED HEALTH CARE EDUCATION/TRAINING PROGRAM

## 2024-04-11 PROCEDURE — 97116 GAIT TRAINING THERAPY: CPT

## 2024-04-11 PROCEDURE — 85025 COMPLETE CBC W/AUTO DIFF WBC: CPT

## 2024-04-11 PROCEDURE — 36415 COLL VENOUS BLD VENIPUNCTURE: CPT

## 2024-04-11 PROCEDURE — 97530 THERAPEUTIC ACTIVITIES: CPT

## 2024-04-11 PROCEDURE — 6360000002 HC RX W HCPCS: Performed by: STUDENT IN AN ORGANIZED HEALTH CARE EDUCATION/TRAINING PROGRAM

## 2024-04-11 PROCEDURE — 1280000000 HC REHAB R&B

## 2024-04-11 PROCEDURE — 80048 BASIC METABOLIC PNL TOTAL CA: CPT

## 2024-04-11 RX ADMIN — PRAVASTATIN SODIUM 40 MG: 40 TABLET ORAL at 20:13

## 2024-04-11 RX ADMIN — ACETAMINOPHEN 650 MG: 325 TABLET ORAL at 20:13

## 2024-04-11 RX ADMIN — ACETAMINOPHEN 650 MG: 325 TABLET ORAL at 07:39

## 2024-04-11 RX ADMIN — Medication 400 UNITS: at 11:58

## 2024-04-11 RX ADMIN — ACETAMINOPHEN 650 MG: 325 TABLET ORAL at 01:32

## 2024-04-11 RX ADMIN — METOPROLOL TARTRATE 100 MG: 50 TABLET, FILM COATED ORAL at 20:13

## 2024-04-11 RX ADMIN — AMIODARONE HYDROCHLORIDE 200 MG: 200 TABLET ORAL at 10:08

## 2024-04-11 RX ADMIN — ENOXAPARIN SODIUM 40 MG: 100 INJECTION SUBCUTANEOUS at 10:07

## 2024-04-11 RX ADMIN — ACETAMINOPHEN 650 MG: 325 TABLET ORAL at 14:50

## 2024-04-11 RX ADMIN — THERA TABS 1 TABLET: TAB at 10:08

## 2024-04-11 RX ADMIN — ASPIRIN 81 MG 81 MG: 81 TABLET ORAL at 10:08

## 2024-04-11 RX ADMIN — METOPROLOL TARTRATE 100 MG: 50 TABLET, FILM COATED ORAL at 10:08

## 2024-04-11 ASSESSMENT — PAIN DESCRIPTION - ONSET: ONSET: ON-GOING

## 2024-04-11 ASSESSMENT — PAIN DESCRIPTION - LOCATION
LOCATION: HIP
LOCATION: LEG
LOCATION: HIP
LOCATION: LEG

## 2024-04-11 ASSESSMENT — PAIN SCALES - GENERAL
PAINLEVEL_OUTOF10: 2
PAINLEVEL_OUTOF10: 7
PAINLEVEL_OUTOF10: 1
PAINLEVEL_OUTOF10: 1
PAINLEVEL_OUTOF10: 4
PAINLEVEL_OUTOF10: 4

## 2024-04-11 ASSESSMENT — PAIN - FUNCTIONAL ASSESSMENT
PAIN_FUNCTIONAL_ASSESSMENT: ACTIVITIES ARE NOT PREVENTED

## 2024-04-11 ASSESSMENT — PAIN DESCRIPTION - DESCRIPTORS
DESCRIPTORS: ACHING
DESCRIPTORS: ACHING
DESCRIPTORS: THROBBING
DESCRIPTORS: ACHING

## 2024-04-11 ASSESSMENT — PAIN DESCRIPTION - ORIENTATION
ORIENTATION: RIGHT

## 2024-04-11 ASSESSMENT — PAIN SCALES - WONG BAKER: WONGBAKER_NUMERICALRESPONSE: NO HURT

## 2024-04-11 ASSESSMENT — PAIN DESCRIPTION - FREQUENCY: FREQUENCY: INTERMITTENT

## 2024-04-11 ASSESSMENT — PAIN DESCRIPTION - PAIN TYPE: TYPE: SURGICAL PAIN

## 2024-04-11 NOTE — PROGRESS NOTES
[facility] - Inpatient Rehabilitation Department  Phone: [phone]    Physical Therapy    [] Initial Evaluation            [x] Daily Treatment Note         [] Discharge Summary      Patient: Charline Renner   : 1940   MRN: [id number]   Date of Service:  2024  Admitting Diagnosis: Closed right hip fracture, sequela  Current Admission Summary: Charline Renner is a 83 y.o. female with PMHx notable for Afib (not on anticoagulation),  macular degeneration (legally blind) who was admitted to [facility] on 3/1 after mechanical fall with right hip fracture. Ortho was consulted, and she underwent IM nail placement on 4/3. Course complicated by Afib w/ RVR. Cardiology had recommend consideration of Watchman device.  Patient was admitted to our rehabilitation unit on , and the next day she developed a rapid ventricular rate and was found to be in A-fib, and was discharged off a rehabilitation to the acute floor and seen by cardiology.  Patient noted to have paroxysmal atrial fibrillation, and spontaneously converted back to sinus rhythm.  No oral anticoagulation was recommended due to her history of falls and GI bleeding.  She will continue with aspirin treatment.  She will continue on oral amiodarone to help stabilize her rhythm.  Patient will follow-up with her cardiologist at [facility] after discharge.  Patient was felt stable, she is now admitted back to a rehabilitation unit for treatment of her right hip fracture and improving her independence and self-care, ambulation, and safety before discharge to home.    Past Medical History:  has a past medical history of A-fib (HCC), Hyperlipidemia, Hypertension, and Macular degeneration.  Past Surgical History:  has a past surgical history that includes hip surgery (Right, 4/3/2024).  Discharge Recommendations: Home with home health therapy, family assist PRN  DME Required For Discharge: rolling walker  Precautions/Restrictions: high fall risk, up  (CGA)  -Ballet bar: RLE assisted 4 inch stool taps 2x10 (decreased hip flexion to clear without A), RLE hip abduction 2x10.    Pt returned to supine with min A of R LE into bed.  All needs in reach.   -Bridging x 10 reps (support at feet provided)  -PROM hip and knee flexion to increase ROM x 10 reps         Functional Outcomes                 Cognition  WFL  Orientation:    alert and oriented x 4  Command Following:   WFL    Education  Barriers To Learning: visual  Patient Education: patient educated on goals, PT role and benefits, plan of care, weight-bearing education, general safety, functional mobility training, proper use of assistive device/equipment, disease specific education, pressure relief, transfer training  Learning Assessment:  patient verbalizes and demonstrates understanding    Assessment  Activity Tolerance: Appropriate rest breaks due to pain with mobility.    Impairments Requiring Therapeutic Intervention: decreased functional mobility, decreased ADL status, decreased ROM, decreased strength, decreased endurance, decreased balance, increased pain, decreased posture  Prognosis: good  Clinical Assessment: Patient demonstrating improved ability to achieve and maintain standing position with ongoing heavy reliance on UE support.  Patients ambulation distance and elena significantly improved on this date however continues to have antalgic gait pattern.  Pt would benefit from skilled PT services in attempt to regain baseline functional independence to allow return to home with decreased risk of falling.   Safety Interventions: patient left in chair, chair alarm in place, call light within reach, gait belt, and nurse notified    Plan  Frequency: 5 x/week, 90 min/day  Current Treatment Recommendations: strengthening, ROM, balance training, functional mobility training, transfer training, gait training, stair training, endurance training, modalities, patient/caregiver education, ADL/self-care training,

## 2024-04-11 NOTE — PLAN OF CARE
Problem: Discharge Planning  Goal: Discharge to home or other facility with appropriate resources  4/10/2024 2331 by Smitha Coleman RN  Outcome: Progressing     Problem: Safety - Adult  Goal: Free from fall injury  4/10/2024 2331 by Smitha Coleman RN  Outcome: Progressing     Problem: Pain  Goal: Verbalizes/displays adequate comfort level or baseline comfort level  4/10/2024 2331 by Smitha Coleman RN  Outcome: Progressing     Problem: ABCDS Injury Assessment  Goal: Absence of physical injury  Outcome: Progressing     Problem: Skin/Tissue Integrity  Goal: Absence of new skin breakdown  Description: 1.  Monitor for areas of redness and/or skin breakdown  2.  Assess vascular access sites hourly  3.  Every 4-6 hours minimum:  Change oxygen saturation probe site  4.  Every 4-6 hours:  If on nasal continuous positive airway pressure, respiratory therapy assess nares and determine need for appliance change or resting period.  Outcome: Progressing

## 2024-04-11 NOTE — PROGRESS NOTES
Charline Renner  4/11/2024  [id number]    Chief Complaint: Closed right hip fracture, sequela    Subjective:   Patient seen this AM. Patient was discussed in rehab conference today with the entire rehab team which includes PT, OT, nursing, social service, dietary, and speech therapy to determine how much progress the patient has made in therapies, and when the patient will be ready for discharge to home safely. We think the patient will be ready for discharge to home on 4/19.  Patient now requires contact-guard assist for transfers and is able to ambulate 80 feet with min assist using a rolling walker.  She requires supervision for upper body bathing and dressing, and contact-guard to min assist for lower body bathing and dressing.  We will continue to work with the patient in the next 8 days with discharge to home at that time.      Last BM: Stool Occurrence: 1 (04/10/24 2111)    Objective:  Patient Vitals for the past 24 hrs:   BP Temp Temp src Pulse Resp SpO2 Weight   04/11/24 0730 -- -- -- -- -- -- 75.5 kg (166 lb 6.4 oz)   04/10/24 2130 126/66 98.2 °F (36.8 °C) Oral 73 18 92 % --   04/10/24 1511 -- -- -- -- 16 -- --   04/10/24 1000 -- -- -- 60 -- 94 % --       Gen: No distress, pleasant.   HEENT: Normocephalic, atraumatic.   CV: Regular rate and rhythm. Extremities warm, well perfused.   Resp: No respiratory distress. CTAB   Abd: Soft, nontender   Ext: No edema.   Neuro: Alert, oriented, appropriately interactive.     Laboratory data: Available via EMR.     Therapy progress:       PT    Supine to Sit: Substantial/maximal assistance  Sit to Supine: Partial/moderate assistance   Sit to Stand: Supervision or touching assistance  Chair/Bed to Chair Transfer: Partial/moderate assistance  Car Transfer: Partial/moderate assistance  Ambulation 10 ft: Partial/moderate assistance  Ambulation 50 ft: Partial/moderate assistance  Ambulation 150 ft:    Stairs - 1 Step: Partial/moderate assistance  Stairs - 4 Step:    Stairs - 12

## 2024-04-11 NOTE — PROGRESS NOTES
[facility] - Inpatient Rehabilitation Department   Phone: [phone]    Occupational Therapy    [] Initial Evaluation            [x] Daily Treatment Note         [] Discharge Summary      Patient: Charline Renner   : 1940   MRN: [id number]   Date of Service:  2024    Admitting Diagnosis:  Closed right hip fracture, sequela  Current Admission Summary:   Charline Renner is a 83 y.o. female with PMHx notable for Afib (not on anticoagulation),  macular degeneration (legally blind) who was admitted to [facility] on 3/1 after mechanical fall with right hip fracture. Ortho was consulted, and she underwent IM nail placement on 4/3. Course complicated by Afib w/ RVR. Cardiology had recommend consideration of Watchman device.  Patient was admitted to our rehabilitation unit on , and the next day she developed a rapid ventricular rate and was found to be in A-fib, and was discharged off a rehabilitation to the acute floor and seen by cardiology.  Patient noted to have paroxysmal atrial fibrillation, and spontaneously converted back to sinus rhythm.  No oral anticoagulation was recommended due to her history of falls and GI bleeding.  She will continue with aspirin treatment.  She will continue on oral amiodarone to help stabilize her rhythm.  Patient will follow-up with her cardiologist at [facility] after discharge.  Patient was felt stable, she is now admitted back to a rehabilitation unit for treatment of her right hip fracture and improving her independence and self-care, ambulation, and safety before discharge to home.  Repeat labs yesterday look good and are stable   Past Medical History:  has a past medical history of A-fib (HCC), Hyperlipidemia, Hypertension, and Macular degeneration.  Past Surgical History:  has a past surgical history that includes hip surgery (Right, 4/3/2024).    Discharge Recommendations: Home with HHOT     DME Required For Discharge: DME to be determined pending

## 2024-04-11 NOTE — PLAN OF CARE
Problem: Discharge Planning  Goal: Discharge to home or other facility with appropriate resources  4/11/2024 1030 by Jeny Shultz RN  Outcome: Progressing     Problem: Safety - Adult  Goal: Free from fall injury  4/11/2024 1030 by Jeny Shultz RN  Outcome: Progressing     Problem: Pain  Goal: Verbalizes/displays adequate comfort level or baseline comfort level  4/11/2024 1030 by Jeny Shultz RN  Outcome: Progressing     Problem: Skin/Tissue Integrity  Goal: Absence of new skin breakdown  Description: 1.  Monitor for areas of redness and/or skin breakdown  2.  Assess vascular access sites hourly  3.  Every 4-6 hours minimum:  Change oxygen saturation probe site  4.  Every 4-6 hours:  If on nasal continuous positive airway pressure, respiratory therapy assess nares and determine need for appliance change or resting period.  4/11/2024 1030 by Jeny Shultz RN  Outcome: Progressing     Problem: Nutrition Deficit:  Goal: Optimize nutritional status  Outcome: Progressing

## 2024-04-11 NOTE — CARE COORDINATION
Team conference held today. Spoke with patient ans Son to discuss progress with therapy, barriers to discharge, and plans to return home. Estimated discharge date set for 04/19/2024. Patient anticipates discharging to home with needed supports. Will continue to follow for support and discharge planning.       Electronically signed by ABHISHEK Low on 4/11/2024 at 1:25 PM

## 2024-04-12 LAB
ANION GAP SERPL CALCULATED.3IONS-SCNC: 8 MMOL/L (ref 3–16)
BASOPHILS # BLD: 0.1 K/UL (ref 0–0.2)
BASOPHILS NFR BLD: 0.8 %
BUN SERPL-MCNC: 17 MG/DL (ref 7–20)
CALCIUM SERPL-MCNC: 8.5 MG/DL (ref 8.3–10.6)
CHLORIDE SERPL-SCNC: 106 MMOL/L (ref 99–110)
CO2 SERPL-SCNC: 27 MMOL/L (ref 21–32)
CREAT SERPL-MCNC: 0.7 MG/DL (ref 0.6–1.2)
DEPRECATED RDW RBC AUTO: 14.1 % (ref 12.4–15.4)
EOSINOPHIL # BLD: 0.3 K/UL (ref 0–0.6)
EOSINOPHIL NFR BLD: 4.2 %
GFR SERPLBLD CREATININE-BSD FMLA CKD-EPI: 84 ML/MIN/{1.73_M2}
GLUCOSE SERPL-MCNC: 101 MG/DL (ref 70–99)
HCT VFR BLD AUTO: 27.8 % (ref 36–48)
HGB BLD-MCNC: 9.3 G/DL (ref 12–16)
LYMPHOCYTES # BLD: 1.8 K/UL (ref 1–5.1)
LYMPHOCYTES NFR BLD: 22.2 %
MCH RBC QN AUTO: 32.9 PG (ref 26–34)
MCHC RBC AUTO-ENTMCNC: 33.4 G/DL (ref 31–36)
MCV RBC AUTO: 98.5 FL (ref 80–100)
MONOCYTES # BLD: 0.9 K/UL (ref 0–1.3)
MONOCYTES NFR BLD: 10.4 %
NEUTROPHILS # BLD: 5.1 K/UL (ref 1.7–7.7)
NEUTROPHILS NFR BLD: 62.4 %
PLATELET # BLD AUTO: 239 K/UL (ref 135–450)
PMV BLD AUTO: 8.7 FL (ref 5–10.5)
POTASSIUM SERPL-SCNC: 4.4 MMOL/L (ref 3.5–5.1)
RBC # BLD AUTO: 2.82 M/UL (ref 4–5.2)
SODIUM SERPL-SCNC: 141 MMOL/L (ref 136–145)
WBC # BLD AUTO: 8.2 K/UL (ref 4–11)

## 2024-04-12 PROCEDURE — 97530 THERAPEUTIC ACTIVITIES: CPT

## 2024-04-12 PROCEDURE — 97110 THERAPEUTIC EXERCISES: CPT

## 2024-04-12 PROCEDURE — 97116 GAIT TRAINING THERAPY: CPT

## 2024-04-12 PROCEDURE — 85025 COMPLETE CBC W/AUTO DIFF WBC: CPT

## 2024-04-12 PROCEDURE — 80048 BASIC METABOLIC PNL TOTAL CA: CPT

## 2024-04-12 PROCEDURE — 36415 COLL VENOUS BLD VENIPUNCTURE: CPT

## 2024-04-12 PROCEDURE — 97535 SELF CARE MNGMENT TRAINING: CPT

## 2024-04-12 PROCEDURE — 6360000002 HC RX W HCPCS: Performed by: STUDENT IN AN ORGANIZED HEALTH CARE EDUCATION/TRAINING PROGRAM

## 2024-04-12 PROCEDURE — 6370000000 HC RX 637 (ALT 250 FOR IP): Performed by: STUDENT IN AN ORGANIZED HEALTH CARE EDUCATION/TRAINING PROGRAM

## 2024-04-12 PROCEDURE — 1280000000 HC REHAB R&B

## 2024-04-12 RX ORDER — SODIUM CHLORIDE 0.9 % (FLUSH) 0.9 %
5-40 SYRINGE (ML) INJECTION PRN
Status: DISCONTINUED | OUTPATIENT
Start: 2024-04-12 | End: 2024-04-19 | Stop reason: HOSPADM

## 2024-04-12 RX ADMIN — ACETAMINOPHEN 650 MG: 325 TABLET ORAL at 22:01

## 2024-04-12 RX ADMIN — THERA TABS 1 TABLET: TAB at 08:23

## 2024-04-12 RX ADMIN — PRAVASTATIN SODIUM 40 MG: 40 TABLET ORAL at 22:01

## 2024-04-12 RX ADMIN — Medication 400 UNITS: at 08:24

## 2024-04-12 RX ADMIN — METOPROLOL TARTRATE 100 MG: 50 TABLET, FILM COATED ORAL at 08:23

## 2024-04-12 RX ADMIN — ENOXAPARIN SODIUM 40 MG: 100 INJECTION SUBCUTANEOUS at 10:39

## 2024-04-12 RX ADMIN — POLYETHYLENE GLYCOL 3350 17 G: 17 POWDER, FOR SOLUTION ORAL at 10:39

## 2024-04-12 RX ADMIN — AMIODARONE HYDROCHLORIDE 200 MG: 200 TABLET ORAL at 08:23

## 2024-04-12 RX ADMIN — METOPROLOL TARTRATE 100 MG: 50 TABLET, FILM COATED ORAL at 22:01

## 2024-04-12 RX ADMIN — ACETAMINOPHEN 650 MG: 325 TABLET ORAL at 14:00

## 2024-04-12 RX ADMIN — ACETAMINOPHEN 650 MG: 325 TABLET ORAL at 08:23

## 2024-04-12 RX ADMIN — ASPIRIN 81 MG 81 MG: 81 TABLET ORAL at 08:23

## 2024-04-12 ASSESSMENT — PAIN SCALES - GENERAL
PAINLEVEL_OUTOF10: 4
PAINLEVEL_OUTOF10: 0
PAINLEVEL_OUTOF10: 1
PAINLEVEL_OUTOF10: 6

## 2024-04-12 ASSESSMENT — PAIN DESCRIPTION - ORIENTATION
ORIENTATION: RIGHT
ORIENTATION: RIGHT

## 2024-04-12 ASSESSMENT — PAIN DESCRIPTION - DESCRIPTORS
DESCRIPTORS: ACHING
DESCRIPTORS: ACHING

## 2024-04-12 ASSESSMENT — PAIN DESCRIPTION - LOCATION
LOCATION: LEG
LOCATION: HIP

## 2024-04-12 NOTE — PROGRESS NOTES
with mobility. RN administered medications within session. Pt requesting to use commode.      RW mobility recliner >> commode at SBA/CGA. Toileting completed with SBA, increased time. (The edge of dressing on R LE reinforced w/ tape per RN approval).     Grooming: in stance at sink for hand hygiene, oral care- set-up/SBA for oral care in order for pt to locate tools needed secondary to visual deficits. Increased time required    IADL: item retrieval activity completed in therapy room to address IADL roles, safety, AE use and endurance: pt retrieved a total of 8 items from floor using reacher, initial Gabriel required to problem solve reacher use, progressed to SBA.  increased time for mobility. Good obstacle avoidance.     UB TE seated and in stance for balance, endurance, and strengthening to support IADL/ADL engagement: use of 2lb free weights, pt completed 10 reps x2 of forward punches, pronations/supinations and shoulder flexion. 1st rep seated and 2nd in stance w/ RW anterior as needed for support. In stance pt completed stagger or narrow base stances while completing TE. Rest breaks required between reps. 1 period of Gabriel d/t posterior LOB. CGA majority of time.     Pt ambulated back to room at EOS, increased time, focus on reciprocal pattern. Pt left in chair at EOS, chair alarm on, call light and needs within reach.         Cognition  WFL  Orientation:    alert and oriented x 4  Command Following:   WFL     Education  Barriers To Learning: visual  Patient Education: patient educated on goals, OT role and benefits, plan of care, ADL adaptive strategies, proper use of assistive device/equipment, transfer training, discharge recommendations  Learning Assessment:  patient verbalizes understanding, would benefit from continued reinforcement    Assessment  Activity Tolerance: well   Impairments Requiring Therapeutic Intervention: decreased functional mobility, decreased ADL status, decreased strength, decreased  endurance, decreased balance, decreased vision, decreased IADL, increased pain  Prognosis: good  Clinical Assessment: Pt shows good progress towards goals and con't to work hard w/ therapy. Pt tolerated standing laundry IADL tasks this date, completed at baseline for 10+ min in stance w/o rest break. In PM session pt was able to utilize walker basket and reacher for light IADL simulation to retreive items from floor, demo'd good carryover of tool use as activity progressed.Pt would benefit from ongoing interventions targeting LB AE for LB ADLs and dynamic standing balance activities.  Skilled OT services con't to benefit pt to address above deficits and maximize safety and independence. Con't per POC.   Safety Interventions: patient left in chair, chair alarm in place, call light within reach, patient at risk for falls, and nurse notified    Plan  Frequency: 5 x/week, 90 min/day  Current Treatment Recommendations: strengthening, balance training, functional mobility training, transfer training, endurance training, patient/caregiver education, ADL/self-care training, IADL training, and equipment evaluation/education    Goals  Patient Goals: \"to be able to go up steps\"   Short Term Goals:  Time Frame: 2 weeks   Patient will complete upper body ADL at modified independent   Patient will complete lower body ADL at modified independent   Patient will complete toileting at modified independent   Patient will complete functional transfers at modified independent   Patient to gather and transport items at modified independent     Above goals reviewed on 4/12/2024.  All goals are ongoing at this time unless indicated above.       Therapy Session Time     Individual Group Co-treatment   Time In 0845      Time Out 0915      Minutes 30        Second Session Therapy Time:   Individual Concurrent Group Co-treatment   Time In 1345         Time Out 1445         Minutes 60             Timed Code Treatment Minutes: 30 minutes + 60    Total Treatment Minutes: 90    First session: JOE Serrano OTR/JUAN JOSE, [id number] 4/12/2024 10:06 AM    Second session: OSVALDO Valerio/JUAN JOSE #[id number], 4/12/2024, 7800

## 2024-04-12 NOTE — PROGRESS NOTES
[facility] - Inpatient Rehabilitation Department  Phone: [phone]    Physical Therapy    [] Initial Evaluation            [x] Daily Treatment Note         [] Discharge Summary      Patient: Charline Renner   : 1940   MRN: [id number]   Date of Service:  2024  Admitting Diagnosis: Closed right hip fracture, sequela  Current Admission Summary: Charline Renner is a 83 y.o. female with PMHx notable for Afib (not on anticoagulation),  macular degeneration (legally blind) who was admitted to [facility] on 3/1 after mechanical fall with right hip fracture. Ortho was consulted, and she underwent IM nail placement on 4/3. Course complicated by Afib w/ RVR. Cardiology had recommend consideration of Watchman device.  Patient was admitted to our rehabilitation unit on , and the next day she developed a rapid ventricular rate and was found to be in A-fib, and was discharged off a rehabilitation to the acute floor and seen by cardiology.  Patient noted to have paroxysmal atrial fibrillation, and spontaneously converted back to sinus rhythm.  No oral anticoagulation was recommended due to her history of falls and GI bleeding.  She will continue with aspirin treatment.  She will continue on oral amiodarone to help stabilize her rhythm.  Patient will follow-up with her cardiologist at [facility] after discharge.  Patient was felt stable, she is now admitted back to a rehabilitation unit for treatment of her right hip fracture and improving her independence and self-care, ambulation, and safety before discharge to home.    Past Medical History:  has a past medical history of A-fib (HCC), Hyperlipidemia, Hypertension, and Macular degeneration.  Past Surgical History:  has a past surgical history that includes hip surgery (Right, 4/3/2024).  Discharge Recommendations: Home with home health therapy, family assist PRN  DME Required For Discharge: rolling walker  Precautions/Restrictions: high fall risk, up  will complete bed mobility at modified independent   Patient will complete transfers at [facility]   Patient will ambulate 150 ft with use of rolling walker at modified independent  Patient will ascend/descend 4 stairs with unilateral (L) HR at [facility]  Patient will ascend/descend 13 stairs with unilateral (R) HR at modified independent    Above goals reviewed on 4/12/2024.  All goals are ongoing at this time unless indicated above.      Therapy Session Time      Individual Group Co-treatment   Time In 0920       Time Out 1020       Minutes 60            Second Session Therapy Time:   Individual Group Co-treatment   Time In 1245       Time Out 1315       Minutes 30           Total Treatment Minutes:  90 minutes    Session Completed By: Marshall Fischer PT, DPT - [id number], 4/12/2024 10:22 AM

## 2024-04-12 NOTE — PLAN OF CARE
Problem: Safety - Adult  Goal: Free from fall injury  4/11/2024 2344 by Sunita Castillo, RN  Outcome: Progressing     Problem: Pain  Goal: Verbalizes/displays adequate comfort level or baseline comfort level  4/11/2024 2344 by Sunita Castillo, RN  Outcome: Progressing

## 2024-04-12 NOTE — PROGRESS NOTES
04/12/24 1728   RT Protocol   History Pulmonary Disease 0   Respiratory pattern 0   Breath sounds 2   Cough 0   Bronchodilator Assessment Score 2

## 2024-04-12 NOTE — PROGRESS NOTES
Charline Renner  4/12/2024  [id number]    Chief Complaint: Closed right hip fracture, sequela    Subjective:   Patient seen this AM.  Patient was discussed in rehab conference yesterday with the entire rehab team, and we think the patient will be ready for discharge to home on 4/19.  Patient now requires contact-guard assist for transfers and is able to ambulate 80 feet with min assist using a rolling walker.  She requires min assist for lower body bathing and dressing.  Repeat labs this morning look good and are stable.  Her right hip pain is under control with her current medication.    Last BM: Stool Occurrence: 1 (04/10/24 2111)    Objective:  Patient Vitals for the past 24 hrs:   BP Temp Temp src Pulse Resp SpO2 Weight   04/12/24 0508 -- -- -- -- -- -- 76.5 kg (168 lb 9.6 oz)   04/11/24 2000 123/67 98.3 °F (36.8 °C) Oral 69 16 91 % --   04/11/24 1000 118/62 98.1 °F (36.7 °C) -- 85 18 96 % --       Gen: No distress, pleasant.   HEENT: Normocephalic, atraumatic.   CV: Extremities warm, well perfused.   Resp: No respiratory distress. CTAB   Abd: Soft, nontender   Ext: No edema.   Neuro: Alert, oriented, appropriately interactive.     Laboratory data: Available via EMR.     Therapy progress:       PT    Supine to Sit: Substantial/maximal assistance  Sit to Supine: Partial/moderate assistance   Sit to Stand: Supervision or touching assistance  Chair/Bed to Chair Transfer: Supervision or touching assistance  Car Transfer: Partial/moderate assistance  Ambulation 10 ft: Supervision or touching assistance  Ambulation 50 ft: Supervision or touching assistance  Ambulation 150 ft:    Stairs - 1 Step: Partial/moderate assistance  Stairs - 4 Step:    Stairs - 12 Step:      OT    Eating: Setup or clean-up assistance  Oral Hygiene: Supervision or touching assistance  Bathing: Supervision or touching assistance  Upper Body Dressing: Setup or clean-up assistance  Lower Body Dressing: Partial/moderate assistance  Toilet Transfer:  Supervision or touching assistance  Toilet Hygiene: Partial/moderate assistance         Body mass index is 28.49 kg/m².      This patient continues to require an ARU level of care from all disciplines to address the following issues:    Patient Active Problem List   Diagnosis    Intertrochanteric fracture of right femur, closed, initial encounter ([facility])    Atrial fibrillation with RVR ([facility])    Closed fracture of femur, intertrochanteric, right, sequela    History of gastrointestinal bleeding    Coronary artery disease involving native coronary artery of native heart without angina pectoris    Elevated troponin    Closed right hip fracture, sequela        Rehabilitation Diagnosis:   R intertrochanteric femur fracture        Assessment and Plan:  #. R intertrochanteric femur fracture  - s/p IMN on 4/3  - WBAT  - pain control, as below     #. HTN  - Metoprolol  mg daily     #.  Paroxysmal Afib  - Not on anticoagulation due to prior GI bleed  - BB, as above , and amiodarone added.     #. HLD  - Pravastatin     Diet: No diet orders on file  Bowels: Per protocol  Bladder: Per protocol   Pain: Tylenol 650 mg q4h PRN, oxycodone 5 mg q4h PRN for moderate-severe   DVT PPx: Lovenox   Follow-ups: Ortho, Cardiology, PCP  ELOS: 4/19       Ravi Ivy MD 4/12/2024, 7:30 AM      * This document was created using dictation software.  While all precautions were taken to ensure accuracy, errors may have occurred.  Please disregard any typographical errors.

## 2024-04-12 NOTE — RT PROTOCOL NOTE
RT Inhaler-Nebulizer Bronchodilator Protocol Note    There is a bronchodilator order in the chart from a provider indicating to follow the RT Bronchodilator Protocol and there is an “Initiate RT Inhaler-Nebulizer Bronchodilator Protocol” order as well (see protocol at bottom of note).    CXR Findings:  No results found.    The findings from the last RT Protocol Assessment were as follows:   History Pulmonary Disease: None or smoker <15 pack years  Respiratory Pattern: Regular pattern and RR 12-20 bpm  Breath Sounds: Slightly diminished and/or crackles  Cough: Strong, spontaneous, non-productive  Indication for Bronchodilator Therapy:    Bronchodilator Assessment Score: 2    Aerosolized bronchodilator medication orders have been revised according to the RT Inhaler-Nebulizer Bronchodilator Protocol below.    Respiratory Therapist to perform RT Therapy Protocol Assessment initially then follow the protocol.  Repeat RT Therapy Protocol Assessment PRN for score 0-3 or on second treatment, BID, and PRN for scores above 3.    No Indications - adjust the frequency to every 6 hours PRN wheezing or bronchospasm, if no treatments needed after 48 hours then discontinue using Per Protocol order mode.     If indication present, adjust the RT bronchodilator orders based on the Bronchodilator Assessment Score as indicated below.  Use Inhaler orders unless patient has one or more of the following: on home nebulizer, not able to hold breath for 10 seconds, is not alert and oriented, cannot activate and use MDI correctly, or respiratory rate 25 breaths per minute or more, then use the equivalent nebulizer order(s) with same Frequency and PRN reasons based on the score.  If a patient is on this medication at home then do not decrease Frequency below that used at home.    0-3 - enter or revise RT bronchodilator order(s) to equivalent RT Bronchodilator order with Frequency of every 4 hours PRN for wheezing or increased work of breathing  using Per Protocol order mode.        4-6 - enter or revise RT Bronchodilator order(s) to two equivalent RT bronchodilator orders with one order with BID Frequency and one order with Frequency of every 4 hours PRN wheezing or increased work of breathing using Per Protocol order mode.        7-10 - enter or revise RT Bronchodilator order(s) to two equivalent RT bronchodilator orders with one order with TID Frequency and one order with Frequency of every 4 hours PRN wheezing or increased work of breathing using Per Protocol order mode.       11-13 - enter or revise RT Bronchodilator order(s) to one equivalent RT bronchodilator order with QID Frequency and an Albuterol order with Frequency of every 4 hours PRN wheezing or increased work of breathing using Per Protocol order mode.      Greater than 13 - enter or revise RT Bronchodilator order(s) to one equivalent RT bronchodilator order with every 4 hours Frequency and an Albuterol order with Frequency of every 2 hours PRN wheezing or increased work of breathing using Per Protocol order mode.     RT to enter RT Home Evaluation for COPD & MDI Assessment order using Per Protocol order mode.    Electronically signed by Delfina Chacon RCP on 4/12/2024 at 5:28 PM

## 2024-04-13 PROCEDURE — 6370000000 HC RX 637 (ALT 250 FOR IP): Performed by: STUDENT IN AN ORGANIZED HEALTH CARE EDUCATION/TRAINING PROGRAM

## 2024-04-13 PROCEDURE — 97535 SELF CARE MNGMENT TRAINING: CPT

## 2024-04-13 PROCEDURE — 97110 THERAPEUTIC EXERCISES: CPT

## 2024-04-13 PROCEDURE — 97116 GAIT TRAINING THERAPY: CPT

## 2024-04-13 PROCEDURE — 1280000000 HC REHAB R&B

## 2024-04-13 PROCEDURE — 97530 THERAPEUTIC ACTIVITIES: CPT

## 2024-04-13 PROCEDURE — 6360000002 HC RX W HCPCS: Performed by: STUDENT IN AN ORGANIZED HEALTH CARE EDUCATION/TRAINING PROGRAM

## 2024-04-13 RX ADMIN — ASPIRIN 81 MG 81 MG: 81 TABLET ORAL at 07:59

## 2024-04-13 RX ADMIN — POLYETHYLENE GLYCOL 3350 17 G: 17 POWDER, FOR SOLUTION ORAL at 07:59

## 2024-04-13 RX ADMIN — ACETAMINOPHEN 650 MG: 325 TABLET ORAL at 22:18

## 2024-04-13 RX ADMIN — METOPROLOL TARTRATE 100 MG: 50 TABLET, FILM COATED ORAL at 07:59

## 2024-04-13 RX ADMIN — METOPROLOL TARTRATE 100 MG: 50 TABLET, FILM COATED ORAL at 22:15

## 2024-04-13 RX ADMIN — Medication 400 UNITS: at 07:59

## 2024-04-13 RX ADMIN — THERA TABS 1 TABLET: TAB at 08:00

## 2024-04-13 RX ADMIN — ENOXAPARIN SODIUM 40 MG: 100 INJECTION SUBCUTANEOUS at 07:59

## 2024-04-13 RX ADMIN — AMIODARONE HYDROCHLORIDE 200 MG: 200 TABLET ORAL at 08:00

## 2024-04-13 RX ADMIN — PRAVASTATIN SODIUM 40 MG: 40 TABLET ORAL at 22:15

## 2024-04-13 ASSESSMENT — PAIN SCALES - GENERAL
PAINLEVEL_OUTOF10: 4
PAINLEVEL_OUTOF10: 4
PAINLEVEL_OUTOF10: 0

## 2024-04-13 ASSESSMENT — PAIN DESCRIPTION - ONSET: ONSET: ON-GOING

## 2024-04-13 ASSESSMENT — PAIN DESCRIPTION - LOCATION: LOCATION: LEG

## 2024-04-13 ASSESSMENT — PAIN DESCRIPTION - PAIN TYPE: TYPE: SURGICAL PAIN;ACUTE PAIN

## 2024-04-13 ASSESSMENT — PAIN - FUNCTIONAL ASSESSMENT: PAIN_FUNCTIONAL_ASSESSMENT: ACTIVITIES ARE NOT PREVENTED

## 2024-04-13 ASSESSMENT — PAIN DESCRIPTION - FREQUENCY: FREQUENCY: CONTINUOUS

## 2024-04-13 ASSESSMENT — PAIN DESCRIPTION - ORIENTATION: ORIENTATION: RIGHT

## 2024-04-13 ASSESSMENT — PAIN DESCRIPTION - DESCRIPTORS: DESCRIPTORS: SORE

## 2024-04-13 NOTE — PROGRESS NOTES
balance in unsupported position  Dynamic Sitting Balance: good: independent with functional balance in unsupported position  Static Standing Balance: fair (-): maintains balance at SBA with use of UE support  Dynamic Standing Balance: fair (-): maintains balance at CGA with use of UE support  Comments:    Other Therapeutic Interventions          Second Session:     Pt in recliner at entry, agreeable to OT tx session. Denies pain at rest, discomfort upon standing, pain meds in place.   Pt sit > stand to RW, ambulated to BR with CGA. Pt completed oral hygiene in stance with SBA and Setup assist to open toothpaste. Pt able to load brush and clean dentures with increased time and close SBA.   Pt then ambulated to therapy gym before requiring seated rest at w/c. Pt then DEP assist transfer outside for Therapeutic Exercise. Pt completed the following There Ex, seated in w/c with Medium resistance Theraband, 2 x 10 reps (B), rest between sets required, pt denies pain in BUE and tolerated well with verbalized satisfaction of completing outdoors to lift patient's spirits.     -Elbow Flexion  -Elbow Extension  -Scapular Retraction/Protraction     Pt DEP w/c transfer back to room, sit > stand with CGA to RW and ambulated ~15' to recliner with CGA Stand > sit. Pt demos Good hand placement throughout, no cues needed this session.    Pt left with chair alarm on, call light within reach and all needs met. ([id number])      Cognition  WFL  Orientation:    alert and oriented x 4  Command Following:   WFL     Education  Barriers To Learning: visual  Patient Education: patient educated on goals, OT role and benefits, plan of care, ADL adaptive strategies, proper use of assistive device/equipment, transfer training, discharge recommendations  Learning Assessment:  patient verbalizes understanding, would benefit from continued reinforcement    Assessment  Activity Tolerance: well   Impairments Requiring Therapeutic Intervention:        Timed Code Treatment Minutes: 90 minutes + 38   Total Treatment Minutes: 128    Electronically Signed by: HEATHER Orosco/[id number]

## 2024-04-13 NOTE — PROGRESS NOTES
[facility] - Inpatient Rehabilitation Department  Phone: [phone]    Physical Therapy    [] Initial Evaluation            [x] Daily Treatment Note         [] Discharge Summary      Patient: Charline Renner   : 1940   MRN: [id number]   Date of Service:  2024  Admitting Diagnosis: Closed right hip fracture, sequela  Current Admission Summary: Charline Renner is a 83 y.o. female with PMHx notable for Afib (not on anticoagulation),  macular degeneration (legally blind) who was admitted to [facility] on 3/1 after mechanical fall with right hip fracture. Ortho was consulted, and she underwent IM nail placement on 4/3. Course complicated by Afib w/ RVR. Cardiology had recommend consideration of Watchman device.  Patient was admitted to our rehabilitation unit on , and the next day she developed a rapid ventricular rate and was found to be in A-fib, and was discharged off a rehabilitation to the acute floor and seen by cardiology.  Patient noted to have paroxysmal atrial fibrillation, and spontaneously converted back to sinus rhythm.  No oral anticoagulation was recommended due to her history of falls and GI bleeding.  She will continue with aspirin treatment.  She will continue on oral amiodarone to help stabilize her rhythm.  Patient will follow-up with her cardiologist at [facility] after discharge.  Patient was felt stable, she is now admitted back to a rehabilitation unit for treatment of her right hip fracture and improving her independence and self-care, ambulation, and safety before discharge to home.    Past Medical History:  has a past medical history of A-fib (HCC), Hyperlipidemia, Hypertension, and Macular degeneration.  Past Surgical History:  has a past surgical history that includes hip surgery (Right, 4/3/2024).  Discharge Recommendations: Home with home health therapy, family assist PRN  DME Required For Discharge: rolling walker  Precautions/Restrictions: high fall risk, up  (1 x 10). Pt returned to her room and was left positioned in her chair with her call light within reach and her chair alarm on.     Functional Outcomes                 Cognition  WFL  Orientation:    alert and oriented x 4  Command Following:   WFL    Education  Barriers To Learning: visual  Patient Education: patient educated on goals, PT role and benefits, plan of care, weight-bearing education, general safety, functional mobility training, proper use of assistive device/equipment, disease specific education, pressure relief, transfer training  Learning Assessment:  patient verbalizes and demonstrates understanding    Assessment  Activity Tolerance: Appropriate rest breaks due to pain with mobility.    Impairments Requiring Therapeutic Intervention: decreased functional mobility, decreased ADL status, decreased ROM, decreased strength, decreased endurance, decreased balance, increased pain, decreased posture  Prognosis: good  Clinical Assessment: Pt continues to improve gait quality with more reciprocal vs step to pattern, especially as session continued. Pt also demonstrated increased endurance for functional activity, ambulating increased distance today. Pt did experience pain during mobility which limited her function to some degree, and continues to demonstrate RLE weakness, which is affecting her functional transfers, gait quality, and balance. Pt would benefit from continued skilled PT to maximize safe functional independence.     Safety Interventions: patient left in chair, chair alarm in place, call light within reach, gait belt, and nurse notified    Plan  Frequency: 5 x/week, 90 min/day  Current Treatment Recommendations: strengthening, ROM, balance training, functional mobility training, transfer training, gait training, stair training, endurance training, modalities, patient/caregiver education, ADL/self-care training, pain management, home exercise program, and safety education    Goals  Patient

## 2024-04-13 NOTE — PLAN OF CARE
Problem: Discharge Planning  Goal: Discharge to home or other facility with appropriate resources  Outcome: Progressing     Problem: Safety - Adult  Goal: Free from fall injury  4/13/2024 1012 by Dora Feliciano RN  Outcome: Progressing     Problem: Pain  Goal: Verbalizes/displays adequate comfort level or baseline comfort level  4/13/2024 1012 by Dora Feliciano RN  Outcome: Progressing

## 2024-04-14 VITALS
TEMPERATURE: 98.2 F | HEIGHT: 65 IN | RESPIRATION RATE: 20 BRPM | BODY MASS INDEX: 27.67 KG/M2 | OXYGEN SATURATION: 93 % | WEIGHT: 166.1 LBS | DIASTOLIC BLOOD PRESSURE: 72 MMHG | HEART RATE: 82 BPM | SYSTOLIC BLOOD PRESSURE: 158 MMHG

## 2024-04-14 PROCEDURE — 1280000000 HC REHAB R&B

## 2024-04-14 PROCEDURE — 6370000000 HC RX 637 (ALT 250 FOR IP): Performed by: STUDENT IN AN ORGANIZED HEALTH CARE EDUCATION/TRAINING PROGRAM

## 2024-04-14 PROCEDURE — 6360000002 HC RX W HCPCS: Performed by: STUDENT IN AN ORGANIZED HEALTH CARE EDUCATION/TRAINING PROGRAM

## 2024-04-14 RX ADMIN — PRAVASTATIN SODIUM 40 MG: 40 TABLET ORAL at 21:58

## 2024-04-14 RX ADMIN — ASPIRIN 81 MG 81 MG: 81 TABLET ORAL at 08:24

## 2024-04-14 RX ADMIN — METOPROLOL TARTRATE 100 MG: 50 TABLET, FILM COATED ORAL at 08:24

## 2024-04-14 RX ADMIN — ACETAMINOPHEN 650 MG: 325 TABLET ORAL at 21:58

## 2024-04-14 RX ADMIN — Medication 400 UNITS: at 08:23

## 2024-04-14 RX ADMIN — ENOXAPARIN SODIUM 40 MG: 100 INJECTION SUBCUTANEOUS at 08:23

## 2024-04-14 RX ADMIN — THERA TABS 1 TABLET: TAB at 08:24

## 2024-04-14 RX ADMIN — METOPROLOL TARTRATE 100 MG: 50 TABLET, FILM COATED ORAL at 21:58

## 2024-04-14 RX ADMIN — AMIODARONE HYDROCHLORIDE 200 MG: 200 TABLET ORAL at 08:24

## 2024-04-14 ASSESSMENT — PAIN DESCRIPTION - LOCATION
LOCATION: HIP
LOCATION: HIP

## 2024-04-14 ASSESSMENT — PAIN SCALES - GENERAL
PAINLEVEL_OUTOF10: 0
PAINLEVEL_OUTOF10: 6
PAINLEVEL_OUTOF10: 6
PAINLEVEL_OUTOF10: 2

## 2024-04-14 ASSESSMENT — PAIN SCALES - WONG BAKER: WONGBAKER_NUMERICALRESPONSE: NO HURT

## 2024-04-14 ASSESSMENT — PAIN DESCRIPTION - DESCRIPTORS
DESCRIPTORS: ACHING
DESCRIPTORS: ACHING

## 2024-04-14 ASSESSMENT — PAIN DESCRIPTION - ORIENTATION
ORIENTATION: RIGHT
ORIENTATION: RIGHT

## 2024-04-14 NOTE — PLAN OF CARE
Problem: Discharge Planning  Goal: Discharge to home or other facility with appropriate resources  Outcome: Progressing  Flowsheets (Taken 4/13/2024 2236 by Naomi Victor, RN)  Discharge to home or other facility with appropriate resources: Identify discharge learning needs (meds, wound care, etc)     Problem: Safety - Adult  Goal: Free from fall injury  4/14/2024 1005 by Dora Feliciano, RN  Outcome: Progressing     Problem: Pain  Goal: Verbalizes/displays adequate comfort level or baseline comfort level  4/14/2024 1005 by Dora Feliciano RN  Outcome: Progressing

## 2024-04-14 NOTE — PROGRESS NOTES
Charline Renner  4/13/2024  [id number]    Chief Complaint: Closed right hip fracture, sequela    Subjective:   Patient states that she is feeling well and denies any new onset complaints.    Last BM: Stool Occurrence: 1 (with PCA) (04/13/24 1934)    Objective:  Patient Vitals for the past 24 hrs:   BP Temp Temp src Pulse Resp SpO2 Weight   04/13/24 0757 (!) 123/57 98.1 °F (36.7 °C) Oral 65 18 93 % --   04/13/24 0600 -- -- -- -- -- -- 76.4 kg (168 lb 8 oz)   04/12/24 2145 123/63 97.8 °F (36.6 °C) Oral 73 16 96 % --     Gen: No distress, pleasant.   HEENT: Normocephalic, atraumatic.   CV: Extremities warm, well perfused.   Resp: No respiratory distress. CTAB   Abd: Soft, nontender   Ext: No edema.   Neuro: Alert, oriented, appropriately interactive.     Laboratory data: Available via EMR.     Therapy progress:       PT    Supine to Sit: Substantial/maximal assistance  Sit to Supine: Partial/moderate assistance   Sit to Stand: Supervision or touching assistance  Chair/Bed to Chair Transfer: Supervision or touching assistance  Car Transfer: Partial/moderate assistance  Ambulation 10 ft: Supervision or touching assistance  Ambulation 50 ft: Supervision or touching assistance  Ambulation 150 ft: Supervision or touching assistance  Stairs - 1 Step: Supervision or touching assistance  Stairs - 4 Step:    Stairs - 12 Step:      OT    Eating: Setup or clean-up assistance  Oral Hygiene: Supervision or touching assistance  Bathing: Supervision or touching assistance  Upper Body Dressing: Setup or clean-up assistance  Lower Body Dressing: Partial/moderate assistance  Toilet Transfer: Supervision or touching assistance  Toilet Hygiene: Supervision or touching assistance         Body mass index is 28.48 kg/m².      This patient continues to require an ARU level of care from all disciplines to address the following issues:    Patient Active Problem List   Diagnosis    Intertrochanteric fracture of right femur, closed, initial encounter  (HCC)    Atrial fibrillation with RVR (HCC)    Closed fracture of femur, intertrochanteric, right, sequela    History of gastrointestinal bleeding    Coronary artery disease involving native coronary artery of native heart without angina pectoris    Elevated troponin    Closed right hip fracture, sequela        Rehabilitation Diagnosis:   R intertrochanteric femur fracture        Assessment and Plan:  #. R intertrochanteric femur fracture  - s/p IMN on 4/3  - WBAT  - pain control, as below     #. HTN  - Metoprolol  mg daily     #.  Paroxysmal Afib  - Not on anticoagulation due to prior GI bleed  - BB, as above , and amiodarone added.     #. HLD  - Pravastatin     Diet: No diet orders on file  Bowels: Per protocol  Bladder: Per protocol   Pain: Tylenol 650 mg q4h PRN, oxycodone 5 mg q4h PRN for moderate-severe   DVT PPx: Lovenox   Follow-ups: Ortho, Cardiology, PCP  ELOS: 4/19     John Borrego DO  4/13/2024, 9:17 PM      * This document was created using dictation software.  While all precautions were taken to ensure accuracy, errors may have occurred.  Please disregard any typographical errors.

## 2024-04-14 NOTE — PROGRESS NOTES
Morning assessment completed, patient up in the chair, alert and oriented, denies pain, VSS, The care plan and education has been reviewed and mutually agreed upon with the patient.  Pt remains free from falls. Fall precautions in place--bed in lowest position, call light within reach, bed alarm in use. Pt aware to call for assistance before getting up.

## 2024-04-14 NOTE — PROGRESS NOTES
Assessment complete. Alert, oriented x4. Reports soreness in right leg. Patient wondering if she has sat up in the chair for too long. Given PRN acetaminophen for relief per patient request. Ambulated to bathroom using walker and standby assistance. Voided. Continent. The care plan and education has been reviewed and mutually agreed upon with the patient. In bed, alarm on, bed in lowest position, call light and table within reach. No further needs expressed at this time.

## 2024-04-14 NOTE — PLAN OF CARE
Problem: Safety - Adult  Goal: Free from fall injury  Outcome: Progressing     Problem: Pain  Goal: Verbalizes/displays adequate comfort level or baseline comfort level  Outcome: Progressing  Flowsheets (Taken 4/13/2024 2213)  Verbalizes/displays adequate comfort level or baseline comfort level: Encourage patient to monitor pain and request assistance     Problem: ABCDS Injury Assessment  Goal: Absence of physical injury  Outcome: Progressing  Flowsheets (Taken 4/14/2024 0013)  Absence of Physical Injury: Implement safety measures based on patient assessment     Problem: Skin/Tissue Integrity  Goal: Absence of new skin breakdown  Description: 1.  Monitor for areas of redness and/or skin breakdown  2.  Assess vascular access sites hourly  3.  Every 4-6 hours minimum:  Change oxygen saturation probe site  4.  Every 4-6 hours:  If on nasal continuous positive airway pressure, respiratory therapy assess nares and determine need for appliance change or resting period.  Outcome: Progressing

## 2024-04-15 LAB
ANION GAP SERPL CALCULATED.3IONS-SCNC: 10 MMOL/L (ref 3–16)
BASOPHILS # BLD: 0.1 K/UL (ref 0–0.2)
BASOPHILS NFR BLD: 1.4 %
BUN SERPL-MCNC: 16 MG/DL (ref 7–20)
CALCIUM SERPL-MCNC: 8.9 MG/DL (ref 8.3–10.6)
CHLORIDE SERPL-SCNC: 109 MMOL/L (ref 99–110)
CO2 SERPL-SCNC: 24 MMOL/L (ref 21–32)
CREAT SERPL-MCNC: 0.6 MG/DL (ref 0.6–1.2)
DEPRECATED RDW RBC AUTO: 15.2 % (ref 12.4–15.4)
EOSINOPHIL # BLD: 0.3 K/UL (ref 0–0.6)
EOSINOPHIL NFR BLD: 3.5 %
GFR SERPLBLD CREATININE-BSD FMLA CKD-EPI: 87 ML/MIN/{1.73_M2}
GLUCOSE SERPL-MCNC: 102 MG/DL (ref 70–99)
HCT VFR BLD AUTO: 31.5 % (ref 36–48)
HGB BLD-MCNC: 10.1 G/DL (ref 12–16)
LYMPHOCYTES # BLD: 1.9 K/UL (ref 1–5.1)
LYMPHOCYTES NFR BLD: 20.9 %
MCH RBC QN AUTO: 32 PG (ref 26–34)
MCHC RBC AUTO-ENTMCNC: 32.1 G/DL (ref 31–36)
MCV RBC AUTO: 99.7 FL (ref 80–100)
MONOCYTES # BLD: 0.9 K/UL (ref 0–1.3)
MONOCYTES NFR BLD: 10.1 %
NEUTROPHILS # BLD: 5.9 K/UL (ref 1.7–7.7)
NEUTROPHILS NFR BLD: 64.1 %
PLATELET # BLD AUTO: 253 K/UL (ref 135–450)
PMV BLD AUTO: 8.9 FL (ref 5–10.5)
POTASSIUM SERPL-SCNC: 5 MMOL/L (ref 3.5–5.1)
RBC # BLD AUTO: 3.16 M/UL (ref 4–5.2)
SODIUM SERPL-SCNC: 143 MMOL/L (ref 136–145)
WBC # BLD AUTO: 9.2 K/UL (ref 4–11)

## 2024-04-15 PROCEDURE — 85025 COMPLETE CBC W/AUTO DIFF WBC: CPT

## 2024-04-15 PROCEDURE — 97116 GAIT TRAINING THERAPY: CPT

## 2024-04-15 PROCEDURE — 36415 COLL VENOUS BLD VENIPUNCTURE: CPT

## 2024-04-15 PROCEDURE — 97530 THERAPEUTIC ACTIVITIES: CPT

## 2024-04-15 PROCEDURE — 97112 NEUROMUSCULAR REEDUCATION: CPT

## 2024-04-15 PROCEDURE — 97535 SELF CARE MNGMENT TRAINING: CPT

## 2024-04-15 PROCEDURE — 97110 THERAPEUTIC EXERCISES: CPT

## 2024-04-15 PROCEDURE — 1280000000 HC REHAB R&B

## 2024-04-15 PROCEDURE — 6370000000 HC RX 637 (ALT 250 FOR IP): Performed by: STUDENT IN AN ORGANIZED HEALTH CARE EDUCATION/TRAINING PROGRAM

## 2024-04-15 PROCEDURE — 94760 N-INVAS EAR/PLS OXIMETRY 1: CPT

## 2024-04-15 PROCEDURE — 80048 BASIC METABOLIC PNL TOTAL CA: CPT

## 2024-04-15 PROCEDURE — 6360000002 HC RX W HCPCS: Performed by: STUDENT IN AN ORGANIZED HEALTH CARE EDUCATION/TRAINING PROGRAM

## 2024-04-15 RX ADMIN — AMIODARONE HYDROCHLORIDE 200 MG: 200 TABLET ORAL at 08:49

## 2024-04-15 RX ADMIN — ACETAMINOPHEN 650 MG: 325 TABLET ORAL at 13:19

## 2024-04-15 RX ADMIN — ACETAMINOPHEN 650 MG: 325 TABLET ORAL at 06:39

## 2024-04-15 RX ADMIN — METOPROLOL TARTRATE 100 MG: 50 TABLET, FILM COATED ORAL at 08:49

## 2024-04-15 RX ADMIN — PRAVASTATIN SODIUM 40 MG: 40 TABLET ORAL at 23:05

## 2024-04-15 RX ADMIN — ASPIRIN 81 MG 81 MG: 81 TABLET ORAL at 08:50

## 2024-04-15 RX ADMIN — METOPROLOL TARTRATE 100 MG: 50 TABLET, FILM COATED ORAL at 23:05

## 2024-04-15 RX ADMIN — ACETAMINOPHEN 650 MG: 325 TABLET ORAL at 23:08

## 2024-04-15 RX ADMIN — Medication 400 UNITS: at 08:49

## 2024-04-15 RX ADMIN — ENOXAPARIN SODIUM 40 MG: 100 INJECTION SUBCUTANEOUS at 08:49

## 2024-04-15 RX ADMIN — THERA TABS 1 TABLET: TAB at 08:50

## 2024-04-15 ASSESSMENT — PAIN - FUNCTIONAL ASSESSMENT
PAIN_FUNCTIONAL_ASSESSMENT: ACTIVITIES ARE NOT PREVENTED
PAIN_FUNCTIONAL_ASSESSMENT: ACTIVITIES ARE NOT PREVENTED

## 2024-04-15 ASSESSMENT — PAIN DESCRIPTION - FREQUENCY
FREQUENCY: INTERMITTENT
FREQUENCY: INTERMITTENT

## 2024-04-15 ASSESSMENT — PAIN DESCRIPTION - DIRECTION: RADIATING_TOWARDS: KNEE

## 2024-04-15 ASSESSMENT — PAIN SCALES - GENERAL
PAINLEVEL_OUTOF10: 3
PAINLEVEL_OUTOF10: 2
PAINLEVEL_OUTOF10: 6
PAINLEVEL_OUTOF10: 0
PAINLEVEL_OUTOF10: 0

## 2024-04-15 ASSESSMENT — PAIN DESCRIPTION - DESCRIPTORS
DESCRIPTORS: ACHING

## 2024-04-15 ASSESSMENT — PAIN DESCRIPTION - ORIENTATION
ORIENTATION: RIGHT

## 2024-04-15 ASSESSMENT — PAIN DESCRIPTION - PAIN TYPE
TYPE: SURGICAL PAIN
TYPE: SURGICAL PAIN;ACUTE PAIN

## 2024-04-15 ASSESSMENT — PAIN DESCRIPTION - LOCATION
LOCATION: HIP

## 2024-04-15 ASSESSMENT — PAIN SCALES - WONG BAKER
WONGBAKER_NUMERICALRESPONSE: NO HURT

## 2024-04-15 ASSESSMENT — PAIN DESCRIPTION - ONSET: ONSET: ON-GOING

## 2024-04-15 NOTE — PROGRESS NOTES
Morning assessment completed, vss, alert and oriented, denies pain, schedule meds given, The care plan and education has been reviewed and mutually agreed upon with the patient.  Pt remains free from falls. Fall precautions in place--bed in lowest position, call light within reach, bed alarm in use. Pt aware to call for assistance before getting up.

## 2024-04-15 NOTE — PLAN OF CARE
Problem: Safety - Adult  Goal: Free from fall injury  Outcome: Progressing     Problem: Pain  Goal: Verbalizes/displays adequate comfort level or baseline comfort level  Outcome: Progressing     Problem: ABCDS Injury Assessment  Goal: Absence of physical injury  Outcome: Progressing  Flowsheets (Taken 4/15/2024 0303)  Absence of Physical Injury: Implement safety measures based on patient assessment     Problem: Skin/Tissue Integrity  Goal: Absence of new skin breakdown  Description: 1.  Monitor for areas of redness and/or skin breakdown  2.  Assess vascular access sites hourly  3.  Every 4-6 hours minimum:  Change oxygen saturation probe site  4.  Every 4-6 hours:  If on nasal continuous positive airway pressure, respiratory therapy assess nares and determine need for appliance change or resting period.  Outcome: Progressing

## 2024-04-15 NOTE — PROGRESS NOTES
Charline Renner  4/15/2024  [id number]    Chief Complaint: Closed right hip fracture, sequela    Subjective:   Patient reports that she is doing well today.  Slept well the past 2 nights.  Reports some yellow/straw-colored drainage from her surgical incision, but overall thinks that the pain and swelling are improving in the right hip/thigh.  Appetite is adequate. Denies fevers/chills, chest pain, dyspnea, abdominal pain.     Last BM: Stool Occurrence: 1 (with PCA) (04/13/24 1934)    Objective:  Patient Vitals for the past 24 hrs:   BP Temp Temp src Pulse Resp SpO2 Weight   04/15/24 0848 (!) 121/50 98 °F (36.7 °C) Oral 78 18 93 % --   04/15/24 0500 -- -- -- -- -- -- 75.3 kg (166 lb)   04/14/24 2145 (!) 158/72 98.2 °F (36.8 °C) Oral 82 20 93 % --     Gen: No distress, pleasant.   HEENT: Normocephalic, atraumatic.   CV: Regular rate and rhythm. Extremities warm, well perfused.   Resp: No respiratory distress. CTAB   Abd: Soft, nontender   Ext: Right lateral thigh with dressing in place, clean/dry/intact.  No surrounding erythema, warmth, induration, fluctuance.  No edema.   Neuro: Alert, oriented, appropriately interactive.     Laboratory data: Available via EMR.     Therapy progress:       PT    Supine to Sit: Substantial/maximal assistance  Sit to Supine: Partial/moderate assistance   Sit to Stand: Supervision or touching assistance  Chair/Bed to Chair Transfer: Supervision or touching assistance  Car Transfer: Partial/moderate assistance  Ambulation 10 ft: Supervision or touching assistance  Ambulation 50 ft: Supervision or touching assistance  Ambulation 150 ft: Supervision or touching assistance  Stairs - 1 Step: Supervision or touching assistance  Stairs - 4 Step:    Stairs - 12 Step:      OT    Eating: Setup or clean-up assistance  Oral Hygiene: Supervision or touching assistance  Bathing: Supervision or touching assistance  Upper Body Dressing: Setup or clean-up assistance  Lower Body Dressing: Partial/moderate

## 2024-04-15 NOTE — PROGRESS NOTES
Nutrition Note    RECOMMENDATIONS  PO Diet: regular  ONS: Ensure Plus High Protein BID (vanilla)      ASSESSMENT   Nutrition follow up: pt remains on a regular diet and is receiving Ensure Plus High Protein BID.  Pt eating 76 - 100% of most meals and reports drinking the Ensure (if the flavor is vanilla).  Will note flavor preferences within ONS order.       Malnutrition Status: No malnutrition  Acute Illness      NUTRITION DIAGNOSIS   Increased nutrient needs related to increase demand for energy/nutrients as evidenced by wounds (recent hip repair)    Goals: PO intake 50% or greater     NUTRITION RELATED FINDINGS  Objective: 4/13 LBM; active BS; BUE nonpitting edema, BLE +1 pitting edema  Wounds: Surgical Incision    CURRENT NUTRITION THERAPIES  ADULT DIET; Regular  ADULT ORAL NUTRITION SUPPLEMENT; Breakfast, Dinner; Standard High Calorie/High Protein Oral Supplement     PO Intake: %   PO Supplement Intake:26-50%      ANTHROPOMETRICS  Current Height: 163.8 cm (5' 4.5\")  Current Weight - Scale: 75.3 kg (166 lb)    Ideal Body Weight (IBW): 123 lbs  (56 kg)        BMI: 28.1      COMPARATIVE STANDARDS  Total Energy Requirements (kcals/day): 1188 - 1544     Protein (g):  74 - 114       Fluid (mL/day):  1500    EDUCATION  Education not indicated     The patient will be monitored per nutrition standards of care. Consult dietitian if additional nutrition interventions are needed prior to RD reassessment.     ALFRED CHOWDHURY, KELSEY, LD    Contact: [phone]

## 2024-04-15 NOTE — PROGRESS NOTES
[facility] - Inpatient Rehabilitation Department  Phone: [phone]    Physical Therapy    [] Initial Evaluation            [x] Daily Treatment Note         [] Discharge Summary      Patient: Charline Renner   : 1940   MRN: [id number]   Date of Service:  4/15/2024  Admitting Diagnosis: Closed right hip fracture, sequela  Current Admission Summary: Charline Renner is a 83 y.o. female with PMHx notable for Afib (not on anticoagulation),  macular degeneration (legally blind) who was admitted to [facility] on 3/1 after mechanical fall with right hip fracture. Ortho was consulted, and she underwent IM nail placement on 4/3. Course complicated by Afib w/ RVR. Cardiology had recommend consideration of Watchman device.  Patient was admitted to our rehabilitation unit on , and the next day she developed a rapid ventricular rate and was found to be in A-fib, and was discharged off a rehabilitation to the acute floor and seen by cardiology.  Patient noted to have paroxysmal atrial fibrillation, and spontaneously converted back to sinus rhythm.  No oral anticoagulation was recommended due to her history of falls and GI bleeding.  She will continue with aspirin treatment.  She will continue on oral amiodarone to help stabilize her rhythm.  Patient will follow-up with her cardiologist at [facility] after discharge.  Patient was felt stable, she is now admitted back to a rehabilitation unit for treatment of her right hip fracture and improving her independence and self-care, ambulation, and safety before discharge to home.    Past Medical History:  has a past medical history of A-fib (HCC), Hyperlipidemia, Hypertension, and Macular degeneration.  Past Surgical History:  has a past surgical history that includes hip surgery (Right, 4/3/2024).  Discharge Recommendations: Home with home health therapy, family assist PRN  DME Required For Discharge: rolling walker  Precautions/Restrictions: high fall risk, up  bed mobility at modified independent   Patient will complete transfers at [facility]   Patient will ambulate 150 ft with use of rolling walker at modified independent  Patient will ascend/descend 4 stairs with unilateral (L) HR at [facility]  Patient will ascend/descend 13 stairs with unilateral (R) HR at modified independent    Above goals reviewed on 4/15/2024.  All goals are ongoing at this time unless indicated above.      Therapy Session Time      Individual Group Co-treatment   Time In 0930       Time Out 1015       Minutes 45            Second Session Therapy Time:   Individual Group Co-treatment   Time In 1245       Time Out 1330       Minutes 45            Total Treatment Minutes: 90 Minutes    Session Completed By: Marshall Fischer PT, DPT - [id number], 4/15/2024 12:40 PM

## 2024-04-15 NOTE — PROGRESS NOTES
transfer bench, walker basket, GB in shower and around toilet    Precautions/Restrictions: high fall risk  Weight Bearing Restrictions: weight bearing as tolerated   [x] Right Lower Extremity      Required Braces/Orthotics: no braces required  Positional Restrictions:no positional restrictions    Pre-Admission Information   Lives With: significant other, partner has dementia                                                   Type of Home: condo  Home Layout: tri-level  Home Access:  2 step to enter without rails. also garge entrance 4-5 steps with hand rail, once in the house has 13 steps to bed/bath with rails on right ascending  Bathroom Layout:  1/2 bath first floor, full bath second floor, tub shower    Bathroom Equipment: has plans to get grab bars installed through [employer]- usually stands to shower   Toilet Height: standard height  Home Equipment:  cane, RW, BSC   Transfer Assistance: Independent without use of device  Ambulation Assistance:Independent without use of device  ADL Assistance: independent with all ADL's  IADL Assistance: independent with homemaking tasks, gets meals on wheels 5 days a week   Active :        [] Yes            [x] No  Current Employment:retired.  Occupation: retired bookeeper at [employer]  Recent Falls:  this fall only in last 6 months- notes lost her balance.     Examination (4/9)  Vision:   Vision Gross Assessment: Legally blind and Visual History: macular degeneration can see shapes/outlines but no faces - difficulties identifying and differentiating colors  Uses magnifier at home to read       Subjective  General: Pt met supine in bed upon arrival - pt pleasant and agreeable to therapy. Pt required increased time to awaken d/t sleeping upon arrival.   Pain: Patient does not rate upon questioning  Pain Interventions: repositioned  and therapy activities modified        Activities of Daily Living  Basic Activities of Daily Living  Feeding: setup assistance  Feeding Comments: set up  transfer: stand by assistance  Stand step transfer: stand by assistance  Toilet transfer: stand by assistance  Toilet transfer equipment: standard bedside commode, grab bars, walker  Toilet transfer comments: use of grab bar on L  Shower transfer: stand by assistance  Shower transfer equipment: bedside commode, grab bars, walker  Shower transfer comments: use of grab bar on L  Comments: good hand placement for all transfers w/o cues  Functional Mobility  Functional Mobility Activity: to/from bathroom  Device Use: rolling walker  Required Assistance: stand by assistance, contact guard assistance  Comment: CGA progressing to SBA at end of session; good RW management    Balance:  Static Sitting Balance: good: independent with functional balance in unsupported position  Dynamic Sitting Balance: good: independent with functional balance in unsupported position  Static Standing Balance: fair (-): maintains balance at SBA with use of UE support  Dynamic Standing Balance: fair (-): maintains balance at SBA with use of UE support  Comments:    Other Therapeutic Interventions          Second Session:  Patient in recliner upon arrival, pleasant and agreeable to OT session. Reporting pain 6/10 in R hip, therapy activities modified and reports she will request medications after session. Reports nervousness regarding upcoming discharge, specifically regarding home tub transfer.    Patient transferred STS from recliner to RW SBA. Mobility to bathroom ~15ft CGA with RW. Toileting and clothing mgmt SBA, voided urine seated on [facility] [place] toilet. Pt completed hand hygiene SBA in stance at sink.     Pt completed mobility from pt room to ADL room with RW SBA, ~130ft. Completed dry tub transfer with use of TTB and RW. Gabriel for entry, assist for lifting R leg over tub ledge. SBA for exiting tub, no assist for LE's.     WC transport to therapy room. Pt participated in standing kitchen task. Activity required pt to retrieve kitchen items

## 2024-04-15 NOTE — PLAN OF CARE
Problem: Discharge Planning  Goal: Discharge to home or other facility with appropriate resources  Outcome: Progressing     Problem: Safety - Adult  Goal: Free from fall injury  4/15/2024 0932 by Dora Feliciano RN  Outcome: Progressing     Problem: Pain  Goal: Verbalizes/displays adequate comfort level or baseline comfort level  4/15/2024 0932 by Dora Feliciano RN  Outcome: Progressing

## 2024-04-16 PROCEDURE — 97116 GAIT TRAINING THERAPY: CPT

## 2024-04-16 PROCEDURE — 97530 THERAPEUTIC ACTIVITIES: CPT

## 2024-04-16 PROCEDURE — 6370000000 HC RX 637 (ALT 250 FOR IP): Performed by: STUDENT IN AN ORGANIZED HEALTH CARE EDUCATION/TRAINING PROGRAM

## 2024-04-16 PROCEDURE — 97535 SELF CARE MNGMENT TRAINING: CPT

## 2024-04-16 PROCEDURE — 6360000002 HC RX W HCPCS: Performed by: STUDENT IN AN ORGANIZED HEALTH CARE EDUCATION/TRAINING PROGRAM

## 2024-04-16 PROCEDURE — 1280000000 HC REHAB R&B

## 2024-04-16 PROCEDURE — 97110 THERAPEUTIC EXERCISES: CPT

## 2024-04-16 RX ORDER — HYDROCHLOROTHIAZIDE 25 MG/1
12.5 TABLET ORAL DAILY
Status: DISCONTINUED | OUTPATIENT
Start: 2024-04-16 | End: 2024-04-19 | Stop reason: HOSPADM

## 2024-04-16 RX ADMIN — HYDROCHLOROTHIAZIDE 12.5 MG: 25 TABLET ORAL at 10:39

## 2024-04-16 RX ADMIN — METOPROLOL TARTRATE 100 MG: 50 TABLET, FILM COATED ORAL at 08:57

## 2024-04-16 RX ADMIN — Medication 400 UNITS: at 08:58

## 2024-04-16 RX ADMIN — ACETAMINOPHEN 650 MG: 325 TABLET ORAL at 22:03

## 2024-04-16 RX ADMIN — THERA TABS 1 TABLET: TAB at 08:57

## 2024-04-16 RX ADMIN — PRAVASTATIN SODIUM 40 MG: 40 TABLET ORAL at 22:03

## 2024-04-16 RX ADMIN — METOPROLOL TARTRATE 100 MG: 50 TABLET, FILM COATED ORAL at 22:03

## 2024-04-16 RX ADMIN — ASPIRIN 81 MG 81 MG: 81 TABLET ORAL at 08:57

## 2024-04-16 RX ADMIN — ENOXAPARIN SODIUM 40 MG: 100 INJECTION SUBCUTANEOUS at 08:57

## 2024-04-16 RX ADMIN — AMIODARONE HYDROCHLORIDE 200 MG: 200 TABLET ORAL at 08:57

## 2024-04-16 RX ADMIN — ACETAMINOPHEN 650 MG: 325 TABLET ORAL at 13:28

## 2024-04-16 RX ADMIN — ACETAMINOPHEN 650 MG: 325 TABLET ORAL at 06:47

## 2024-04-16 ASSESSMENT — PAIN DESCRIPTION - DESCRIPTORS
DESCRIPTORS: DISCOMFORT
DESCRIPTORS: DISCOMFORT

## 2024-04-16 ASSESSMENT — PAIN SCALES - GENERAL
PAINLEVEL_OUTOF10: 0
PAINLEVEL_OUTOF10: 0
PAINLEVEL_OUTOF10: 4
PAINLEVEL_OUTOF10: 2
PAINLEVEL_OUTOF10: 0
PAINLEVEL_OUTOF10: 7

## 2024-04-16 ASSESSMENT — PAIN SCALES - WONG BAKER
WONGBAKER_NUMERICALRESPONSE: NO HURT

## 2024-04-16 ASSESSMENT — PAIN DESCRIPTION - ORIENTATION
ORIENTATION: RIGHT
ORIENTATION: RIGHT

## 2024-04-16 ASSESSMENT — PAIN DESCRIPTION - PAIN TYPE: TYPE: SURGICAL PAIN

## 2024-04-16 ASSESSMENT — PAIN DESCRIPTION - DIRECTION: RADIATING_TOWARDS: RIGHT KNEE

## 2024-04-16 ASSESSMENT — PAIN DESCRIPTION - FREQUENCY: FREQUENCY: INTERMITTENT

## 2024-04-16 ASSESSMENT — PAIN DESCRIPTION - LOCATION
LOCATION: HIP
LOCATION: HIP

## 2024-04-16 NOTE — PROGRESS NOTES
[facility] - Inpatient Rehabilitation Department  Phone: [phone]    Physical Therapy    [] Initial Evaluation            [x] Daily Treatment Note         [] Discharge Summary      Patient: Charline Renner   : 1940   MRN: [id number]   Date of Service:  2024  Admitting Diagnosis: Closed right hip fracture, sequela  Current Admission Summary: Charline Renner is a 83 y.o. female with PMHx notable for Afib (not on anticoagulation), macular degeneration (legally blind) who was admitted to [facility] on 3/1 after mechanical fall with right hip fracture. Ortho was consulted, and she underwent IM nail placement on 4/3. Course complicated by Afib w/ RVR. Cardiology had recommend consideration of Watchman device.  Patient was admitted to our rehabilitation unit on , and the next day she developed a rapid ventricular rate and was found to be in A-fib, and was discharged off a rehabilitation to the acute floor and seen by cardiology.  Patient noted to have paroxysmal atrial fibrillation, and spontaneously converted back to sinus rhythm.  No oral anticoagulation was recommended due to her history of falls and GI bleeding.  She will continue with aspirin treatment.  She will continue on oral amiodarone to help stabilize her rhythm.  Patient will follow-up with her cardiologist at [facility] after discharge.  Patient was felt stable, she is now admitted back to a rehabilitation unit for treatment of her right hip fracture and improving her independence and self-care, ambulation, and safety before discharge to home.    Past Medical History:  has a past medical history of A-fib (HCC), Hyperlipidemia, Hypertension, and Macular degeneration.  Past Surgical History:  has a past surgical history that includes hip surgery (Right, 4/3/2024).  Discharge Recommendations: Home with home health therapy, family assist PRN  DME Required For Discharge: rolling walker  Precautions/Restrictions: high fall risk, up

## 2024-04-16 NOTE — PROGRESS NOTES
Charline Renner  4/16/2024  [id number]    Chief Complaint: Closed right hip fracture, sequela    Subjective:   Patient reports that she is doing well today. Endorses some bilateral (R>L) lower extremity swelling, without pain. She is wearing compression stockings, and keeping her legs elevated. She reports that she was previously taking HCTZ at home, but that it was stopped after her surgery.     Last BM: Stool Occurrence: 0 (04/16/24 0600)    Objective:  Patient Vitals for the past 24 hrs:   BP Temp Temp src Pulse Resp SpO2 Weight   04/16/24 0855 (!) 117/55 98.1 °F (36.7 °C) Oral 65 16 91 % --   04/16/24 0346 -- -- -- -- -- -- 75.6 kg (166 lb 10.7 oz)   04/15/24 2300 (!) 157/68 98.2 °F (36.8 °C) Oral 66 18 96 % --   04/15/24 1837 -- -- -- 75 16 97 % --     Gen: No distress, pleasant.   HEENT: Normocephalic, atraumatic.   CV: Regular rate and rhythm. Extremities warm, well perfused.   Resp: No respiratory distress. CTAB   Abd: Soft, nontender   Ext: Bilateral lower extremity edema (R>L).   Neuro: Alert, oriented, appropriately interactive.     Laboratory data: Available via EMR.     Therapy progress:       PT    Supine to Sit: Supervision or touching assistance  Sit to Supine: Supervision or touching assistance   Sit to Stand: Supervision or touching assistance  Chair/Bed to Chair Transfer: Supervision or touching assistance  Car Transfer: Supervision or touching assistance  Ambulation 10 ft: Supervision or touching assistance  Ambulation 50 ft: Supervision or touching assistance  Ambulation 150 ft: Supervision or touching assistance  Stairs - 1 Step: Supervision or touching assistance  Stairs - 4 Step: Supervision or touching assistance  Stairs - 12 Step:      OT    Eating: Setup or clean-up assistance  Oral Hygiene: Supervision or touching assistance  Bathing: Supervision or touching assistance  Upper Body Dressing: Setup or clean-up assistance  Lower Body Dressing: Partial/moderate assistance  Toilet Transfer:

## 2024-04-16 NOTE — PROGRESS NOTES
Assessment completed. Patient sitting up in bed, alert and oriented x 4 and able to make all her needs known. VSS. Edema noted to BLE and encouraged to keep feet elevated. Dressing to sx incision D&I with old light yellow drainage noted in middle of dressing. VSS, denies any pain at this time. Medications administered as ordered. POC and education reviewed with patient and mutually agreed upon. Safety interventions in place. Call light in reach. All needs met. Will continue to monitor.

## 2024-04-16 NOTE — PROGRESS NOTES
sit><stands x2 to/from couch and low-soft-chair w/ B arm rests to simulate functional transfers in the home at d/c. Pt demo'd good hand placement for all transfers and required CGA progressing to SBA during 2nd rep of transfers  Functional Mobility  Functional Mobility Activity: retrieve items, transport items, to/from bathroom, to/from therapy room  Device Use: rolling walker  Required Assistance: stand by assistance  Comment: SBA w/ increased time w/ grocery cart during IADL completion    Balance:  Static Sitting Balance: good: independent with functional balance in unsupported position  Dynamic Sitting Balance: good: independent with functional balance in unsupported position  Static Standing Balance: fair (-): maintains balance at SBA with use of UE support  Dynamic Standing Balance: fair (-): maintains balance at SBA with use of UE support  Comments:    Other Therapeutic Interventions          Second Session:    Pt met seated in recliner asleep upon arrival - upon awakening, pt pleasant and agreeable to therapy. Pt denied pain.    Sit><stand completed to/from recliner SBA w/ good hand placement. Pt amb to/from bathroom SBA w/ RW.     Toilet transfer completed x2 this date - x1 to BSC over toilet w/ SBA and use of B arm rests on BSC and x1 w/ CGA and increased time from standard toilet w/ use of grab bar on L. Pt ed regarding pros and cons of BSC over toilet - after increased time describing BSC, pt recalling she has BSC in the home that she can place back over standard toilet.     Increased time in pt ed this date regarding AE required for d/c. Large images of equipment printed and provided to pt w/ pt requiring increased time and cues to scan image and understand equipment this writer was describing. Pt verb understanding of needing son to purchase TTB, reacher, and RW basket. Pt stating she already has BSC, leg , and long handled sponge. Pt does not wear socks at baseline and will not require sock aid  however info provided to pt. Pt asking many questions about where to purchase equipment - increased time in ed regarding pricing and stores to purchase equipment from.     Pt inquiring regarding [place] on aging previously asking about installing handrail for garage steps and grab bars in shower and around toilet. Pt ed/encouragement to contact [employer] to have them install this AE to assist in pt IND and safety - pt verb understanding. Pt recalling that phone has been shut off by significant others child and [employer] has home phone number for pt - emotional support provided and pt encouraged to speak w/ son for assistance to re-set up home phone and for him to assist in connecting her w/ [employer] - pt verb understanding.     Pt left seated in recliner w/ chair alarm, call light, and all other needs within reach.       Cognition  WFL  Orientation:    alert and oriented x 4  Command Following:   WFL     Education  Barriers To Learning: visual  Patient Education: patient educated on goals, OT role and benefits, plan of care, ADL adaptive strategies, proper use of assistive device/equipment, transfer training, discharge recommendations  Learning Assessment:  patient verbalizes understanding, would benefit from continued reinforcement    Assessment  Activity Tolerance: well   Impairments Requiring Therapeutic Intervention: decreased functional mobility, decreased ADL status, decreased strength, decreased endurance, decreased balance, decreased vision, decreased IADL, increased pain  Prognosis: good  Clinical Assessment: Pt con't to participate well and is making good progress in therapy. Good balance during grocery simulation task w/ grocery cart this date. Ed regarding AE required for d/c completed this date. SBA for standing balance during functional tasks and SBA for functional mobility w/ RW w/ increased activity tolerance and RW management this date. Skilled OT services con't to benefit pt to address above deficits and maximize

## 2024-04-16 NOTE — PLAN OF CARE
Problem: Discharge Planning  Goal: Discharge to home or other facility with appropriate resources  4/16/2024 0930 by Macrina Callejas RN  Outcome: Progressing  Flowsheets (Taken 4/16/2024 0902)  Discharge to home or other facility with appropriate resources:   Identify barriers to discharge with patient and caregiver   Identify discharge learning needs (meds, wound care, etc)  4/16/2024 0049 by Smitha Coleman RN  Outcome: Progressing     Problem: Safety - Adult  Goal: Free from fall injury  4/16/2024 0930 by Macrina Callejas RN  Outcome: Progressing  4/16/2024 0049 by Smitha Coleman RN  Outcome: Progressing     Problem: Pain  Goal: Verbalizes/displays adequate comfort level or baseline comfort level  4/16/2024 0930 by Macrina Callejas RN  Outcome: Progressing  Flowsheets (Taken 4/16/2024 0855)  Verbalizes/displays adequate comfort level or baseline comfort level:   Encourage patient to monitor pain and request assistance   Assess pain using appropriate pain scale   Administer analgesics based on type and severity of pain and evaluate response   Implement non-pharmacological measures as appropriate and evaluate response  4/16/2024 0049 by Smitha Coleman RN  Outcome: Progressing     Problem: ABCDS Injury Assessment  Goal: Absence of physical injury  4/16/2024 0930 by Macrina Callejas RN  Outcome: Progressing  4/16/2024 0049 by Smitha Coleman RN  Outcome: Progressing     Problem: Skin/Tissue Integrity  Goal: Absence of new skin breakdown  Description: 1.  Monitor for areas of redness and/or skin breakdown  2.  Assess vascular access sites hourly  3.  Every 4-6 hours minimum:  Change oxygen saturation probe site  4.  Every 4-6 hours:  If on nasal continuous positive airway pressure, respiratory therapy assess nares and determine need for appliance change or resting period.  4/16/2024 0930 by Macrina Callejas RN  Outcome: Progressing  4/16/2024 0049 by Smitha Coleman RN  Outcome: Progressing

## 2024-04-17 ENCOUNTER — APPOINTMENT (OUTPATIENT)
Dept: GENERAL RADIOLOGY | Age: 84
DRG: 561 | End: 2024-04-17
Attending: STUDENT IN AN ORGANIZED HEALTH CARE EDUCATION/TRAINING PROGRAM
Payer: MEDICARE

## 2024-04-17 LAB
ANION GAP SERPL CALCULATED.3IONS-SCNC: 7 MMOL/L (ref 3–16)
BASOPHILS # BLD: 0 K/UL (ref 0–0.2)
BASOPHILS NFR BLD: 0.7 %
BUN SERPL-MCNC: 14 MG/DL (ref 7–20)
CALCIUM SERPL-MCNC: 9 MG/DL (ref 8.3–10.6)
CHLORIDE SERPL-SCNC: 107 MMOL/L (ref 99–110)
CO2 SERPL-SCNC: 28 MMOL/L (ref 21–32)
CREAT SERPL-MCNC: 0.7 MG/DL (ref 0.6–1.2)
DEPRECATED RDW RBC AUTO: 15.5 % (ref 12.4–15.4)
EOSINOPHIL # BLD: 0.3 K/UL (ref 0–0.6)
EOSINOPHIL NFR BLD: 3.7 %
GFR SERPLBLD CREATININE-BSD FMLA CKD-EPI: 84 ML/MIN/{1.73_M2}
GLUCOSE SERPL-MCNC: 98 MG/DL (ref 70–99)
HCT VFR BLD AUTO: 30.9 % (ref 36–48)
HGB BLD-MCNC: 10 G/DL (ref 12–16)
LYMPHOCYTES # BLD: 1.6 K/UL (ref 1–5.1)
LYMPHOCYTES NFR BLD: 21.5 %
MCH RBC QN AUTO: 32.4 PG (ref 26–34)
MCHC RBC AUTO-ENTMCNC: 32.5 G/DL (ref 31–36)
MCV RBC AUTO: 99.6 FL (ref 80–100)
MONOCYTES # BLD: 0.6 K/UL (ref 0–1.3)
MONOCYTES NFR BLD: 8.7 %
NEUTROPHILS # BLD: 4.8 K/UL (ref 1.7–7.7)
NEUTROPHILS NFR BLD: 65.4 %
PLATELET # BLD AUTO: 282 K/UL (ref 135–450)
PMV BLD AUTO: 9 FL (ref 5–10.5)
POTASSIUM SERPL-SCNC: 4.5 MMOL/L (ref 3.5–5.1)
RBC # BLD AUTO: 3.1 M/UL (ref 4–5.2)
SODIUM SERPL-SCNC: 142 MMOL/L (ref 136–145)
WBC # BLD AUTO: 7.3 K/UL (ref 4–11)

## 2024-04-17 PROCEDURE — 6360000002 HC RX W HCPCS: Performed by: STUDENT IN AN ORGANIZED HEALTH CARE EDUCATION/TRAINING PROGRAM

## 2024-04-17 PROCEDURE — 6370000000 HC RX 637 (ALT 250 FOR IP): Performed by: STUDENT IN AN ORGANIZED HEALTH CARE EDUCATION/TRAINING PROGRAM

## 2024-04-17 PROCEDURE — 97116 GAIT TRAINING THERAPY: CPT

## 2024-04-17 PROCEDURE — 36415 COLL VENOUS BLD VENIPUNCTURE: CPT

## 2024-04-17 PROCEDURE — 97530 THERAPEUTIC ACTIVITIES: CPT

## 2024-04-17 PROCEDURE — APPNB45 APP NON BILLABLE 31-45 MINUTES: Performed by: NURSE PRACTITIONER

## 2024-04-17 PROCEDURE — 80048 BASIC METABOLIC PNL TOTAL CA: CPT

## 2024-04-17 PROCEDURE — 85025 COMPLETE CBC W/AUTO DIFF WBC: CPT

## 2024-04-17 PROCEDURE — 97112 NEUROMUSCULAR REEDUCATION: CPT

## 2024-04-17 PROCEDURE — 97535 SELF CARE MNGMENT TRAINING: CPT

## 2024-04-17 PROCEDURE — 1280000000 HC REHAB R&B

## 2024-04-17 PROCEDURE — 73502 X-RAY EXAM HIP UNI 2-3 VIEWS: CPT

## 2024-04-17 PROCEDURE — 99024 POSTOP FOLLOW-UP VISIT: CPT | Performed by: NURSE PRACTITIONER

## 2024-04-17 PROCEDURE — 97110 THERAPEUTIC EXERCISES: CPT

## 2024-04-17 RX ADMIN — ENOXAPARIN SODIUM 40 MG: 100 INJECTION SUBCUTANEOUS at 08:44

## 2024-04-17 RX ADMIN — ACETAMINOPHEN 650 MG: 325 TABLET ORAL at 10:01

## 2024-04-17 RX ADMIN — METOPROLOL TARTRATE 100 MG: 50 TABLET, FILM COATED ORAL at 08:45

## 2024-04-17 RX ADMIN — THERA TABS 1 TABLET: TAB at 08:46

## 2024-04-17 RX ADMIN — AMIODARONE HYDROCHLORIDE 200 MG: 200 TABLET ORAL at 08:46

## 2024-04-17 RX ADMIN — ACETAMINOPHEN 325 MG: 325 TABLET ORAL at 22:15

## 2024-04-17 RX ADMIN — HYDROCHLOROTHIAZIDE 12.5 MG: 25 TABLET ORAL at 08:45

## 2024-04-17 RX ADMIN — METOPROLOL TARTRATE 100 MG: 50 TABLET, FILM COATED ORAL at 22:17

## 2024-04-17 RX ADMIN — PRAVASTATIN SODIUM 40 MG: 40 TABLET ORAL at 22:17

## 2024-04-17 RX ADMIN — Medication 400 UNITS: at 08:52

## 2024-04-17 RX ADMIN — ASPIRIN 81 MG 81 MG: 81 TABLET ORAL at 08:46

## 2024-04-17 ASSESSMENT — PAIN DESCRIPTION - DESCRIPTORS
DESCRIPTORS: ACHING
DESCRIPTORS: ACHING

## 2024-04-17 ASSESSMENT — PAIN DESCRIPTION - ORIENTATION
ORIENTATION: RIGHT
ORIENTATION: RIGHT

## 2024-04-17 ASSESSMENT — PAIN SCALES - GENERAL
PAINLEVEL_OUTOF10: 3
PAINLEVEL_OUTOF10: 5
PAINLEVEL_OUTOF10: 5
PAINLEVEL_OUTOF10: 2
PAINLEVEL_OUTOF10: 5

## 2024-04-17 ASSESSMENT — PAIN DESCRIPTION - LOCATION
LOCATION: HIP
LOCATION: HIP

## 2024-04-17 ASSESSMENT — PAIN SCALES - WONG BAKER: WONGBAKER_NUMERICALRESPONSE: NO HURT

## 2024-04-17 ASSESSMENT — PAIN DESCRIPTION - PAIN TYPE: TYPE: SURGICAL PAIN

## 2024-04-17 ASSESSMENT — PAIN DESCRIPTION - DIRECTION: RADIATING_TOWARDS: RIGHT KNEE

## 2024-04-17 NOTE — PROGRESS NOTES
Charline Renner  4/17/2024  [id number]    Chief Complaint: Closed right hip fracture, sequela    Subjective:   Patient reports that she is doing well today. Notes improvement in her BLE edema since re-starting HCTZ. She continues to use compression stockings while out of bed. Denies any headache, chest pain, shortness of breath.     Last BM: Stool Occurrence: 1 (04/17/24 1200)    Objective:  Patient Vitals for the past 24 hrs:   BP Temp Temp src Pulse Resp SpO2 Weight   04/17/24 0839 (!) 130/51 98.1 °F (36.7 °C) Oral 68 -- 91 % --   04/17/24 0600 -- -- -- -- -- -- 74.3 kg (163 lb 12.8 oz)   04/16/24 2200 (!) 147/66 97.9 °F (36.6 °C) Oral 64 18 98 % --     Gen: No distress, pleasant.   HEENT: Normocephalic, atraumatic.   CV: Regular rate and rhythm. Extremities warm, well perfused.   Resp: No respiratory distress. CTAB   Abd: Soft, nontender   Ext: Bilateral lower extremity edema (R>L), improved from prior.    Neuro: Alert, oriented, appropriately interactive.     Laboratory data: Available via EMR.     Therapy progress:       PT    Supine to Sit: Supervision or touching assistance  Sit to Supine: Supervision or touching assistance   Sit to Stand: Supervision or touching assistance  Chair/Bed to Chair Transfer: Supervision or touching assistance  Car Transfer: Supervision or touching assistance  Ambulation 10 ft: Supervision or touching assistance  Ambulation 50 ft: Supervision or touching assistance  Ambulation 150 ft: Supervision or touching assistance  Stairs - 1 Step: Supervision or touching assistance  Stairs - 4 Step: Supervision or touching assistance  Stairs - 12 Step:      OT    Eating: Setup or clean-up assistance  Oral Hygiene: Supervision or touching assistance  Bathing: Supervision or touching assistance  Upper Body Dressing: Setup or clean-up assistance  Lower Body Dressing: Supervision or touching assistance  Toilet Transfer: Supervision or touching assistance  Toilet Hygiene: Supervision or touching

## 2024-04-17 NOTE — CARE COORDINATION
Discharge Planning.     The SW spoke with the patient and confirmed she is active with [facility] services Program. The SW called [facility] [phone] and left a voicemail asking if they are able to provide a Life Alert Button to the patient. The SW at this time is waiting on a return call.     Electronically signed by ABHISHEK Low on 4/17/2024 at 3:52 PM

## 2024-04-17 NOTE — PLAN OF CARE
Problem: Discharge Planning  Goal: Discharge to home or other facility with appropriate resources  4/16/2024 2328 by Smitha Coleman RN  Outcome: Progressing     Problem: Safety - Adult  Goal: Free from fall injury  4/16/2024 2328 by Smitha Coleman RN  Outcome: Progressing     Problem: Pain  Goal: Verbalizes/displays adequate comfort level or baseline comfort level  4/16/2024 2328 by Smitha Coleman RN  Outcome: Progressing     Problem: ABCDS Injury Assessment  Goal: Absence of physical injury  4/16/2024 2328 by Smitha Coleman RN  Outcome: Progressing     Problem: Skin/Tissue Integrity  Goal: Absence of new skin breakdown  Description: 1.  Monitor for areas of redness and/or skin breakdown  2.  Assess vascular access sites hourly  3.  Every 4-6 hours minimum:  Change oxygen saturation probe site  4.  Every 4-6 hours:  If on nasal continuous positive airway pressure, respiratory therapy assess nares and determine need for appliance change or resting period.  4/16/2024 2328 by Smitha Coleman RN  Outcome: Progressing

## 2024-04-17 NOTE — PLAN OF CARE
Problem: Discharge Planning  Goal: Discharge to home or other facility with appropriate resources  4/17/2024 1159 by Zulay Ledesma RN  Outcome: Progressing  4/16/2024 2328 by Smitha Coleman RN  Outcome: Progressing     Problem: Safety - Adult  Goal: Free from fall injury  4/17/2024 1159 by Zulay Ledesma RN  Outcome: Progressing  4/16/2024 2328 by Smitha Coleman RN  Outcome: Progressing     Problem: Pain  Goal: Verbalizes/displays adequate comfort level or baseline comfort level  4/17/2024 1159 by Zulay Ledesma RN  Outcome: Progressing  4/16/2024 2328 by Smitha Coleman RN  Outcome: Progressing     Problem: ABCDS Injury Assessment  Goal: Absence of physical injury  4/17/2024 1159 by Zulay Ledesma RN  Outcome: Progressing  4/16/2024 2328 by Smitha Coleman RN  Outcome: Progressing     Problem: Skin/Tissue Integrity  Goal: Absence of new skin breakdown  Description: 1.  Monitor for areas of redness and/or skin breakdown  2.  Assess vascular access sites hourly  3.  Every 4-6 hours minimum:  Change oxygen saturation probe site  4.  Every 4-6 hours:  If on nasal continuous positive airway pressure, respiratory therapy assess nares and determine need for appliance change or resting period.  4/17/2024 1159 by Zulay Ledesma RN  Outcome: Progressing  4/16/2024 2328 by Smitha Coleman RN  Outcome: Progressing     Problem: Nutrition Deficit:  Goal: Optimize nutritional status  Outcome: Progressing

## 2024-04-17 NOTE — PATIENT CARE CONFERENCE
[facility]  Inpatient Rehabilitation  Weekly Team Conference Note    Patient Name: Charline Renner        MRN: [id number]    : 1940  (83 y.o.)  Gender: female           The team conference for this patient was held on 2024 at 11am and led by:  Jairo Marie MD     CASE MANAGEMENT:  Assessment: Charline Renner is a 83 female that has been admitted to ARU. Patient prior to admission was living with her significant other but is currently in a Memory care unit. The patient has a supportive son. The patient's discharge date is on 2024 to Home with Home Health care. The SW will continue to follow and update the discharge plan as needed.    PHYSICAL THERAPY:    Bed Mobility:  Supine to Sit: supervision  Sit to Supine: supervision  Scooting: supervision  Bridging: supervision  Comments: Completes with increased time with intermittent use of UE for repositioning of (R) LE.  Educated on use of leg  if unable to complete on home bed environment (patient owns from prior injury).  Transfers:  Sit to stand transfer: supervision  Stand to sit transfer: supervision  Stand step transfer: supervision  Comments: All transfers completed with use of RW.    Ambulation:  Surface:level surface  Assistive Device: rolling walker  Assistance: supervision  Distance: 250 ft + 75 ft + short distances within session  Gait Mechanics: reciprocal pattern with decreased (R) LE SLS phase, improving elena  Comments:    Stair Mobility:  Number of Steps: 6  Step Height: 6 inch  Hand Rails: (L) ascending handrail  Assistance: contact guard assistance  Comments: Step to mechanics with VC for sequence  Stair Mobility Trial 2  Number of Steps: 6  Step Height: 6 inch  Hand Rails: (B) handrail  Assistance: supervision  Comments: Step to mechanics with VC for sequence    QM:  Roll Left and Right  Assistance Needed: Supervision or touching assistance  Comment: Unable to roll to the (R) due to pain/surgical site.  Max (A)

## 2024-04-17 NOTE — PROGRESS NOTES
skilled PT to maximize safe functional independence.   Safety Interventions: patient left in chair, chair alarm in place, call light within reach, gait belt, and nurse notified    Plan  Frequency: 5 x/week, 90 min/day  Current Treatment Recommendations: strengthening, ROM, balance training, functional mobility training, transfer training, gait training, stair training, endurance training, modalities, patient/caregiver education, ADL/self-care training, pain management, home exercise program, and safety education    Goals  Patient Goals: To return home independently   Short Term Goals:  Time Frame: 2 weeks  Patient will complete bed mobility at modified independent   Patient will complete transfers at [facility]   Patient will ambulate 150 ft with use of rolling walker at modified independent  Patient will ascend/descend 4 stairs with unilateral (L) HR at [facility]  Patient will ascend/descend 13 stairs with unilateral (R) HR at modified independent    Above goals reviewed on 4/17/2024.  All goals are ongoing at this time unless indicated above.      Therapy Session Time      Individual Group Co-treatment   Time In 0915       Time Out 1020       Minutes 65            Second Session Therapy Time:   Individual Group Co-treatment   Time In 1345       Time Out 1420       Minutes 35            Total Treatment Minutes: 100 Minutes    Session Completed By: Marshall Fischer PT, DPT - [id number], 4/17/2024 11:19 AM

## 2024-04-17 NOTE — CARE COORDINATION
Discharge Planning.     The SW sent a referral to [facility]. The SW at this time is waiting on a return call.    [facility].  [id number]  [place] [address]  Phone: [phone]  Fax: [phone]        Electronically signed by ABHISHEK Low on 4/17/2024 at 8:38 AM       [facility] care accepted and following.     Electronically signed by ABHISHEK Low on 4/17/2024 at 3:32 PM

## 2024-04-17 NOTE — PROGRESS NOTES
Department of Orthopedic Surgery  Progress Note    Subjective:       Systemic or Specific Complaints:  RIGHT hip pain is under control.  Plans on dc home in 2 days.  Denies new issues.  Had some mild serous drainage from proximal incision a day ago.  Staples still in place.     Objective:     Patient Vitals for the past 24 hrs:   BP Temp Temp src Pulse Resp SpO2 Weight   04/17/24 0839 (!) 130/51 98.1 °F (36.7 °C) Oral 68 -- 91 % --   04/17/24 0600 -- -- -- -- -- -- 74.3 kg (163 lb 12.8 oz)   04/16/24 2200 (!) 147/66 97.9 °F (36.6 °C) Oral 64 18 98 % --       General: alert, appears stated age, and cooperative   Wound: Wound clean and dry no evidence of infection., No Erythema, No Drainage, Wound Intact, and Positive for Edema   Motion: Painful range of Motion in affected extremity   DVT Exam: No evidence of DVT seen on physical exam.     Additional exam: Mild erythema about proximal incisions with staples in place.  No active drainage. No fluctuance to palpation or pain.   Mild ecchymosis noted.  No active drainage/bleeding noted today.  Thigh is soft and mildly tender.  Both feet have 1+ pulses and equal           Data Review  CBC:   Lab Results   Component Value Date/Time    WBC 7.3 04/17/2024 05:52 AM    RBC 3.10 04/17/2024 05:52 AM    HGB 10.0 04/17/2024 05:52 AM    HCT 30.9 04/17/2024 05:52 AM     04/17/2024 05:52 AM       Renal:   Lab Results   Component Value Date/Time     04/17/2024 05:52 AM    K 4.5 04/17/2024 05:52 AM     04/17/2024 05:52 AM    CO2 28 04/17/2024 05:52 AM    BUN 14 04/17/2024 05:52 AM    CREATININE 0.7 04/17/2024 05:52 AM    GLUCOSE 98 04/17/2024 05:52 AM    CALCIUM 9.0 04/17/2024 05:52 AM            Assessment:      right hip IM nail POD 14.        Plan:     RN to remove staples today.  New films ordered today per protocol     1:  Continue with PT/OT.  WBAT.    2:  Continue Deep venous thrombosis prophylaxis - Lovenox with ASA 81mg daily - continue another two

## 2024-04-17 NOTE — PLAN OF CARE
Cahrline Renner was evaluated today and a DME order was entered for a wheeled walker because she requires this to successfully complete daily living tasks of ambulating.  A wheeled walker is necessary due to the patient's unsteady gait, upper body weakness, and inability to  an ambulation device; and she can ambulate only by pushing a walker instead of a lesser assistive device such as a cane, crutch, or standard walker.  The need for this equipment was discussed with the patient and she understands and is in agreement.    Jairo Marie MD 04/17/24 5:03 PM

## 2024-04-17 NOTE — PROGRESS NOTES
[facility] - Inpatient Rehabilitation Department   Phone: [phone]    Occupational Therapy    [] Initial Evaluation            [x] Daily Treatment Note         [] Discharge Summary      Patient: Charline Renner   : 1940   MRN: [id number]   Date of Service:  2024    Admitting Diagnosis:  Closed right hip fracture, sequela  Current Admission Summary:   Charline Renner is a 83 y.o. female with PMHx notable for Afib (not on anticoagulation),  macular degeneration (legally blind) who was admitted to [facility] on 3/1 after mechanical fall with right hip fracture. Ortho was consulted, and she underwent IM nail placement on 4/3. Course complicated by Afib w/ RVR. Cardiology had recommend consideration of Watchman device.  Patient was admitted to our rehabilitation unit on , and the next day she developed a rapid ventricular rate and was found to be in A-fib, and was discharged off a rehabilitation to the acute floor and seen by cardiology.  Patient noted to have paroxysmal atrial fibrillation, and spontaneously converted back to sinus rhythm.  No oral anticoagulation was recommended due to her history of falls and GI bleeding.  She will continue with aspirin treatment.  She will continue on oral amiodarone to help stabilize her rhythm.  Patient will follow-up with her cardiologist at [facility] after discharge.  Patient was felt stable, she is now admitted back to a rehabilitation unit for treatment of her right hip fracture and improving her independence and self-care, ambulation, and safety before discharge to home.  Repeat labs yesterday look good and are stable   Past Medical History:  has a past medical history of A-fib (HCC), Hyperlipidemia, Hypertension, and Macular degeneration.  Past Surgical History:  has a past surgical history that includes hip surgery (Right, 4/3/2024).    Discharge Recommendations: Home with HHOT     DME Required For Discharge: sock aid, reacher, LHS, tub  transfer bench, RW, walker basket, GB in shower and around toilet    Precautions/Restrictions: high fall risk  Weight Bearing Restrictions: weight bearing as tolerated   [x] Right Lower Extremity      Required Braces/Orthotics: no braces required  Positional Restrictions:no positional restrictions    Pre-Admission Information   Lives With: significant other, partner has dementia                                                   Type of Home: condo  Home Layout: tri-level  Home Access:  2 step to enter without rails. also garge entrance 4-5 steps with hand rail, once in the house has 13 steps to bed/bath with rails on right ascending  Bathroom Layout:  1/2 bath first floor, full bath second floor, tub shower    Bathroom Equipment: has plans to get grab bars installed through [employer]- usually stands to shower   Toilet Height: standard height  Home Equipment:  cane, RW, BSC   Transfer Assistance: Independent without use of device  Ambulation Assistance:Independent without use of device  ADL Assistance: independent with all ADL's  IADL Assistance: independent with homemaking tasks, gets meals on wheels 5 days a week   Active :        [] Yes            [x] No  Current Employment:retired.  Occupation: retired bookeeper at [employer]  Recent Falls:  this fall only in last 6 months- notes lost her balance.     Examination (4/9)  Vision:   Vision Gross Assessment: Legally blind and Visual History: macular degeneration can see shapes/outlines but no faces - difficulties identifying and differentiating colors  Uses magnifier at home to read       Subjective  General: Pt met supine in bed upon arrival, asleep and easy to arouse- pleasant and agreeable to therapy. Patient requesting information about dementia disease process for significant other- education provided verbally. Pt verb understanding and appreciation. Increased time required for providing emotional support.  Pain: 0/10  Pain Interventions: not applicable     Activities

## 2024-04-18 PROCEDURE — 97530 THERAPEUTIC ACTIVITIES: CPT

## 2024-04-18 PROCEDURE — 6370000000 HC RX 637 (ALT 250 FOR IP): Performed by: STUDENT IN AN ORGANIZED HEALTH CARE EDUCATION/TRAINING PROGRAM

## 2024-04-18 PROCEDURE — 1280000000 HC REHAB R&B

## 2024-04-18 PROCEDURE — 97110 THERAPEUTIC EXERCISES: CPT

## 2024-04-18 PROCEDURE — 97535 SELF CARE MNGMENT TRAINING: CPT

## 2024-04-18 PROCEDURE — 97116 GAIT TRAINING THERAPY: CPT

## 2024-04-18 PROCEDURE — 6360000002 HC RX W HCPCS: Performed by: STUDENT IN AN ORGANIZED HEALTH CARE EDUCATION/TRAINING PROGRAM

## 2024-04-18 RX ADMIN — THERA TABS 1 TABLET: TAB at 08:39

## 2024-04-18 RX ADMIN — Medication 400 UNITS: at 08:46

## 2024-04-18 RX ADMIN — HYDROCHLOROTHIAZIDE 12.5 MG: 25 TABLET ORAL at 08:39

## 2024-04-18 RX ADMIN — AMIODARONE HYDROCHLORIDE 200 MG: 200 TABLET ORAL at 08:38

## 2024-04-18 RX ADMIN — ACETAMINOPHEN 650 MG: 325 TABLET ORAL at 20:57

## 2024-04-18 RX ADMIN — PRAVASTATIN SODIUM 40 MG: 40 TABLET ORAL at 20:58

## 2024-04-18 RX ADMIN — ACETAMINOPHEN 650 MG: 325 TABLET ORAL at 08:40

## 2024-04-18 RX ADMIN — ENOXAPARIN SODIUM 40 MG: 100 INJECTION SUBCUTANEOUS at 08:38

## 2024-04-18 RX ADMIN — METOPROLOL TARTRATE 100 MG: 50 TABLET, FILM COATED ORAL at 20:58

## 2024-04-18 RX ADMIN — METOPROLOL TARTRATE 100 MG: 50 TABLET, FILM COATED ORAL at 08:40

## 2024-04-18 RX ADMIN — ASPIRIN 81 MG 81 MG: 81 TABLET ORAL at 08:39

## 2024-04-18 ASSESSMENT — PAIN DESCRIPTION - LOCATION
LOCATION: HIP
LOCATION: KNEE

## 2024-04-18 ASSESSMENT — PAIN DESCRIPTION - DESCRIPTORS
DESCRIPTORS: ACHING
DESCRIPTORS: DISCOMFORT

## 2024-04-18 ASSESSMENT — PAIN SCALES - GENERAL
PAINLEVEL_OUTOF10: 0
PAINLEVEL_OUTOF10: 3
PAINLEVEL_OUTOF10: 1

## 2024-04-18 ASSESSMENT — PAIN DESCRIPTION - ORIENTATION
ORIENTATION: RIGHT
ORIENTATION: RIGHT

## 2024-04-18 ASSESSMENT — PAIN DESCRIPTION - PAIN TYPE: TYPE: CHRONIC PAIN

## 2024-04-18 ASSESSMENT — PAIN DESCRIPTION - ONSET: ONSET: ON-GOING

## 2024-04-18 NOTE — PROGRESS NOTES
transfer: modified independent  Shower transfer equipment: tub transfer bench, grab bars, walker  Comments: To/from recliner, toilet, and TTB  Functional Mobility  Functional Mobility Activity: retrieve items, transport items, to/from bathroom  Device Use: rolling walker  Required Assistance: modified independent  Comment: Retrieved clothing items and transported into bathroom    Balance:  Static Sitting Balance: good: independent with functional balance in unsupported position  Dynamic Sitting Balance: good: independent with functional balance in unsupported position  Static Standing Balance: fair (-): maintains balance at Lulu with use of UE support  Dynamic Standing Balance: fair (-): maintains balance at Lulu with use of UE support  Comments:    Other Therapeutic Interventions          Second Session:  Patient in recliner upon arrival, pleasant and agreeable to session. No reports of pain.     Patient completed STS from recliner to RW Lulu. Mobility to bathroom with RW Lulu.    Patient completes toileting mod I- voids loose BM. Reports loose BM's over last few days, possible due to lactose intolerance. Nsg aware.    Patient completes oral care / denture hygiene in stance at sink with RW Lulu.     Pt completes mobility ~110ft to complete tub transfer. Able to transfer Lulu with TTB and RW. Pt reports feeling confident she can safely complete tub transfer in home bathroom.    Patient completed mobility back to room ~30ft Lulu with RW.     Patient left in recliner with alarm on, call button within reach, and all needs met.     Cognition  WFL  Orientation:    alert and oriented x 4  Command Following:   WFL     Education  Barriers To Learning: visual  Patient Education: patient educated on goals, OT role and benefits, plan of care, ADL adaptive strategies, proper use of assistive device/equipment, transfer training, discharge recommendations  Learning Assessment:  patient verbalizes understanding, would benefit from  continued reinforcement    Assessment  Activity Tolerance: good  Impairments Requiring Therapeutic Intervention: decreased functional mobility, decreased ADL status, decreased strength, decreased endurance, decreased balance, decreased vision, decreased IADL, increased pain  Prognosis: good  Clinical Assessment: Pt has participated well and has made good progress in therapy. Safe to discharge home with HHOT. Tolerated shower well this date, able to complete without assist. Able to complete tub transfer this date without assist; reports feeling confident to complete safely at home. Lulu for standing balance during functional tasks functional mobility and transfers w/ RW. Family has purchased all equipment for home. Recommend HHOT services to benefit pt to continue to address above deficits and maximize safety and independence.   Safety Interventions: patient left in chair, chair alarm in place, call light within reach, and patient at risk for falls    Plan  Frequency: 5 x/week, 90 min/day  Current Treatment Recommendations: strengthening, balance training, functional mobility training, transfer training, endurance training, patient/caregiver education, ADL/self-care training, IADL training, and equipment evaluation/education    Goals  Patient Goals: \"to be able to go up steps\"   Short Term Goals:  Time Frame: 2 weeks   Patient will complete upper body ADL at modified independent Met 4/18/24  Patient will complete lower body ADL at modified independent Met 4/18/24  Patient will complete toileting at modified independent Met 4/18/24  Patient will complete functional transfers at modified independent Met 4/18/24  Patient to gather and transport items at modified independent Met 4/18/24    Above goals reviewed on 4/18/2024.  All goals are ongoing at this time unless indicated above.       Therapy Session Time  First Session   Individual Group Co-treatment   Time In 0730      Time Out 0830      Minutes 60        Second

## 2024-04-18 NOTE — CARE COORDINATION
Discharge Planning.     The SW called Marshall from [facility] and left a voicemail informing him the patient will need a Walker at discharge. The SW at this time is waiting on a return call.     Electronically signed by ABHISHEK Low on 4/18/2024 at 9:02 AM

## 2024-04-18 NOTE — PROGRESS NOTES
Charline Renner  4/18/2024  [id number]    Chief Complaint: Closed right hip fracture, sequela    Subjective:   Patient reports that she is doing well today. Denies any fevers, chills, chest pain, shortness of breath. Tolerated staple removal well yesterday.     Last BM: Stool Occurrence: 0 (04/1940)    Objective:  Patient Vitals for the past 24 hrs:   BP Temp Temp src Pulse Resp SpO2 Weight   04/18/24 0830 (!) 117/56 98.5 °F (36.9 °C) Oral 60 18 93 % --   04/18/24 0445 -- -- -- -- -- -- 73 kg (161 lb)   04/17/24 2217 129/73 -- -- 74 -- -- --   04/17/24 2145 129/73 98.3 °F (36.8 °C) Oral 74 18 99 % --     Gen: No distress, pleasant.   HEENT: Normocephalic, atraumatic.   CV: Regular rate and rhythm. Extremities warm, well perfused.   Resp: No respiratory distress. CTAB   Abd: Soft, nontender   Ext: Mild R>L lower extremity edema  Neuro: Alert, oriented, appropriately interactive.     Laboratory data: Available via EMR.     Therapy progress:       PT    Supine to Sit: Independent  Sit to Supine: Independent   Sit to Stand: Independent  Chair/Bed to Chair Transfer: Independent  Car Transfer: Independent  Ambulation 10 ft: Independent  Ambulation 50 ft: Independent  Ambulation 150 ft: Independent  Stairs - 1 Step: Independent  Stairs - 4 Step: Independent  Stairs - 12 Step: Independent    OT    Eating: Independent  Oral Hygiene: Independent  Bathing: Independent  Upper Body Dressing: Independent  Lower Body Dressing: Independent  Toilet Transfer: Independent  Toilet Hygiene: Independent    Speech Therapy         Body mass index is 27.21 kg/m².    Assessment/Plan:  Functional progress: Ambulating 250' mod-I w/ RW.     This patient continues to require an ARU level of care from all disciplines to address the following issues:    #. R intertrochanteric femur fracture  - s/p IMN on 4/3  - WBAT  - pain control, as below  - Ortho consulted for routine post-op follow-up.    - staples removed on 4/17   - stable post-op XR on

## 2024-04-18 NOTE — PLAN OF CARE
Problem: Discharge Planning  Goal: Discharge to home or other facility with appropriate resources  Outcome: Progressing  Flowsheets (Taken 4/18/2024 0830)  Discharge to home or other facility with appropriate resources:   Identify barriers to discharge with patient and caregiver   Arrange for needed discharge resources and transportation as appropriate   Identify discharge learning needs (meds, wound care, etc)     Problem: Safety - Adult  Goal: Free from fall injury  Outcome: Progressing     Problem: Pain  Goal: Verbalizes/displays adequate comfort level or baseline comfort level  Outcome: Progressing  Flowsheets (Taken 4/18/2024 0830)  Verbalizes/displays adequate comfort level or baseline comfort level:   Encourage patient to monitor pain and request assistance   Assess pain using appropriate pain scale   Administer analgesics based on type and severity of pain and evaluate response   Implement non-pharmacological measures as appropriate and evaluate response     Problem: ABCDS Injury Assessment  Goal: Absence of physical injury  Outcome: Progressing     Problem: Skin/Tissue Integrity  Goal: Absence of new skin breakdown  Description: 1.  Monitor for areas of redness and/or skin breakdown  2.  Assess vascular access sites hourly  3.  Every 4-6 hours minimum:  Change oxygen saturation probe site  4.  Every 4-6 hours:  If on nasal continuous positive airway pressure, respiratory therapy assess nares and determine need for appliance change or resting period.  Outcome: Progressing     Problem: Nutrition Deficit:  Goal: Optimize nutritional status  Outcome: Progressing

## 2024-04-18 NOTE — DISCHARGE INSTRUCTIONS
We hope your stay on rehab has exceeded your expectations. Once again the entire Acute Rehab Staff at [facility] wish to thank you for allowing us the privilege to care for you.         A few days after you are discharged from Rehab, you will receive a survey (Manuel  Gannatlaio) in the mail or through email.  This is a nationally distributed survey sent to thousands of rehab patients throughout the nation.              It is very important to everyone on the rehab unit that we receive feedback based on your experience.          Thank you, we wish you good health always,         Acute Rehab Team      - Continue aspirin 81 g twice daily for 12 days to prevent DVT.  Take omeprazole 40 mg daily with breakfast while on twice daily aspirin.  After 12 days resume once daily aspirin, and stop omeprazole.  - Continue home care PT/OT  - Follow-up with Cardiology for consideration of Watchman device, medication management  - Follow-up with Orthopedic Surgery and PCP    Hospital Preference:     __[facility]        Medical Diagnosis/Conditions    _______________________ (free text)    Emergency Contact:    ________________________________________Phone#________________________      Advanced Directives:    Code Status: ?  [x]  Full Code  ?  []  DNR  ? []  DNRCC  ? []  DNRCC - Arrest    (as of date of discharge:  _________)      Medical POA: ?  []   Yes ______________________________ ?  []   No                                       (Name and phone number)                     Living Will:   ?   []   Yes    ?  []   No        Insurance Information:    _______________________ (free text)      Individual Responsible  for the coordination of the discharge/follow up:    ______________________________________________________    Functional Status:    VISUAL DEFICITS:    Yes []  No  []       If yes, assisted device:   Wears Glasses Yes []  No  []  Wears Contacts  Yes []  No  []  Legally Blind Yes []  No  []    HEARING

## 2024-04-18 NOTE — PLAN OF CARE
Problem: Discharge Planning  Goal: Discharge to home or other facility with appropriate resources  4/17/2024 2351 by Smitha Coleman RN  Outcome: Progressing     Problem: Safety - Adult  Goal: Free from fall injury  4/17/2024 2351 by Smitha Coleman RN  Outcome: Progressing     Problem: Pain  Goal: Verbalizes/displays adequate comfort level or baseline comfort level  4/17/2024 2351 by Smitha Coleman RN  Outcome: Progressing     Problem: ABCDS Injury Assessment  Goal: Absence of physical injury  4/17/2024 2351 by Smitha Coleman RN  Outcome: Progressing     Problem: Skin/Tissue Integrity  Goal: Absence of new skin breakdown  Description: 1.  Monitor for areas of redness and/or skin breakdown  2.  Assess vascular access sites hourly  3.  Every 4-6 hours minimum:  Change oxygen saturation probe site  4.  Every 4-6 hours:  If on nasal continuous positive airway pressure, respiratory therapy assess nares and determine need for appliance change or resting period.  4/17/2024 2351 by Smitha Coleman RN  Outcome: Progressing

## 2024-04-18 NOTE — PROGRESS NOTES
[facility] - Inpatient Rehabilitation Department  Phone: [phone]    Physical Therapy    [] Initial Evaluation            [x] Daily Treatment Note         [x] Discharge Summary      Patient: Charline Renner   : 1940   MRN: [id number]   Date of Service:  2024  Admitting Diagnosis: Closed right hip fracture, sequela  Current Admission Summary: Charline Renner is a 83 y.o. female with PMHx notable for Afib (not on anticoagulation), macular degeneration (legally blind) who was admitted to [facility] on 3/1 after mechanical fall with right hip fracture. Ortho was consulted, and she underwent IM nail placement on 4/3. Course complicated by Afib w/ RVR. Cardiology had recommend consideration of Watchman device.  Patient was admitted to our rehabilitation unit on , and the next day she developed a rapid ventricular rate and was found to be in A-fib, and was discharged off a rehabilitation to the acute floor and seen by cardiology.  Patient noted to have paroxysmal atrial fibrillation, and spontaneously converted back to sinus rhythm.  No oral anticoagulation was recommended due to her history of falls and GI bleeding.  She will continue with aspirin treatment.  She will continue on oral amiodarone to help stabilize her rhythm.  Patient will follow-up with her cardiologist at [facility] after discharge.  Patient was felt stable, she is now admitted back to a rehabilitation unit for treatment of her right hip fracture and improving her independence and self-care, ambulation, and safety before discharge to home.    Past Medical History:  has a past medical history of A-fib (HCC), Hyperlipidemia, Hypertension, and Macular degeneration.  Past Surgical History:  has a past surgical history that includes hip surgery (Right, 4/3/2024).  Discharge Recommendations: Home with home health therapy, family assist PRN  DME Required For Discharge: rolling walker  Precautions/Restrictions: high fall risk, up

## 2024-04-18 NOTE — CARE COORDINATION
Team conference held today. Spoke with patient and family to discuss progress with therapy, barriers to discharge, and plans to return home. Estimated discharge date set for 04/19/2024. Patient anticipates discharging to home with needed supports. Will continue to follow for support and discharge planning.     Electronically signed by ABHISHEK Low on 4/18/2024 at 2:35 PM

## 2024-04-19 VITALS
BODY MASS INDEX: 27.82 KG/M2 | OXYGEN SATURATION: 92 % | RESPIRATION RATE: 16 BRPM | SYSTOLIC BLOOD PRESSURE: 150 MMHG | WEIGHT: 167 LBS | HEART RATE: 62 BPM | DIASTOLIC BLOOD PRESSURE: 88 MMHG | HEIGHT: 65 IN | TEMPERATURE: 98.3 F

## 2024-04-19 LAB
ANION GAP SERPL CALCULATED.3IONS-SCNC: 9 MMOL/L (ref 3–16)
BASOPHILS # BLD: 0 K/UL (ref 0–0.2)
BASOPHILS NFR BLD: 0.8 %
BUN SERPL-MCNC: 13 MG/DL (ref 7–20)
CALCIUM SERPL-MCNC: 8.9 MG/DL (ref 8.3–10.6)
CHLORIDE SERPL-SCNC: 108 MMOL/L (ref 99–110)
CO2 SERPL-SCNC: 27 MMOL/L (ref 21–32)
CREAT SERPL-MCNC: 0.6 MG/DL (ref 0.6–1.2)
DEPRECATED RDW RBC AUTO: 16.2 % (ref 12.4–15.4)
EOSINOPHIL # BLD: 0.2 K/UL (ref 0–0.6)
EOSINOPHIL NFR BLD: 3.2 %
GFR SERPLBLD CREATININE-BSD FMLA CKD-EPI: 87 ML/MIN/{1.73_M2}
GLUCOSE SERPL-MCNC: 102 MG/DL (ref 70–99)
HCT VFR BLD AUTO: 31.1 % (ref 36–48)
HGB BLD-MCNC: 10 G/DL (ref 12–16)
LYMPHOCYTES # BLD: 1.6 K/UL (ref 1–5.1)
LYMPHOCYTES NFR BLD: 26.6 %
MCH RBC QN AUTO: 32.1 PG (ref 26–34)
MCHC RBC AUTO-ENTMCNC: 32.1 G/DL (ref 31–36)
MCV RBC AUTO: 100.1 FL (ref 80–100)
MONOCYTES # BLD: 0.6 K/UL (ref 0–1.3)
MONOCYTES NFR BLD: 10.3 %
NEUTROPHILS # BLD: 3.5 K/UL (ref 1.7–7.7)
NEUTROPHILS NFR BLD: 59.1 %
PLATELET # BLD AUTO: 295 K/UL (ref 135–450)
PMV BLD AUTO: 9 FL (ref 5–10.5)
POTASSIUM SERPL-SCNC: 4.3 MMOL/L (ref 3.5–5.1)
RBC # BLD AUTO: 3.11 M/UL (ref 4–5.2)
SODIUM SERPL-SCNC: 144 MMOL/L (ref 136–145)
WBC # BLD AUTO: 6 K/UL (ref 4–11)

## 2024-04-19 PROCEDURE — 85025 COMPLETE CBC W/AUTO DIFF WBC: CPT

## 2024-04-19 PROCEDURE — 36415 COLL VENOUS BLD VENIPUNCTURE: CPT

## 2024-04-19 PROCEDURE — 6360000002 HC RX W HCPCS: Performed by: STUDENT IN AN ORGANIZED HEALTH CARE EDUCATION/TRAINING PROGRAM

## 2024-04-19 PROCEDURE — 80048 BASIC METABOLIC PNL TOTAL CA: CPT

## 2024-04-19 PROCEDURE — 6370000000 HC RX 637 (ALT 250 FOR IP): Performed by: STUDENT IN AN ORGANIZED HEALTH CARE EDUCATION/TRAINING PROGRAM

## 2024-04-19 RX ORDER — VITAMIN E 268 MG
400 CAPSULE ORAL DAILY
Qty: 30 CAPSULE | Refills: 3
Start: 2024-04-19

## 2024-04-19 RX ORDER — HYDROCHLOROTHIAZIDE 12.5 MG/1
12.5 TABLET ORAL DAILY
Qty: 30 TABLET | Refills: 3 | Status: SHIPPED | OUTPATIENT
Start: 2024-04-19

## 2024-04-19 RX ORDER — OMEPRAZOLE 40 MG/1
40 CAPSULE, DELAYED RELEASE ORAL
Qty: 12 CAPSULE | Refills: 0 | Status: SHIPPED | OUTPATIENT
Start: 2024-04-19

## 2024-04-19 RX ORDER — METOPROLOL TARTRATE 100 MG/1
100 TABLET ORAL 2 TIMES DAILY
Qty: 60 TABLET | Refills: 0 | Status: SHIPPED | OUTPATIENT
Start: 2024-04-19

## 2024-04-19 RX ORDER — ASPIRIN 81 MG/1
TABLET, CHEWABLE ORAL
Qty: 114 TABLET | Refills: 0 | Status: SHIPPED | OUTPATIENT
Start: 2024-04-19 | End: 2024-07-29

## 2024-04-19 RX ORDER — MULTIVITAMIN WITH IRON
1 TABLET ORAL DAILY
Qty: 30 TABLET | Refills: 0
Start: 2024-04-19

## 2024-04-19 RX ORDER — AMIODARONE HYDROCHLORIDE 200 MG/1
200 TABLET ORAL DAILY
Qty: 30 TABLET | Refills: 0 | Status: SHIPPED | OUTPATIENT
Start: 2024-04-19

## 2024-04-19 RX ADMIN — AMIODARONE HYDROCHLORIDE 200 MG: 200 TABLET ORAL at 09:24

## 2024-04-19 RX ADMIN — ENOXAPARIN SODIUM 40 MG: 100 INJECTION SUBCUTANEOUS at 09:23

## 2024-04-19 RX ADMIN — METOPROLOL TARTRATE 100 MG: 50 TABLET, FILM COATED ORAL at 09:24

## 2024-04-19 RX ADMIN — HYDROCHLOROTHIAZIDE 12.5 MG: 25 TABLET ORAL at 09:24

## 2024-04-19 RX ADMIN — Medication 400 UNITS: at 09:54

## 2024-04-19 RX ADMIN — ASPIRIN 81 MG 81 MG: 81 TABLET ORAL at 09:24

## 2024-04-19 RX ADMIN — ACETAMINOPHEN 650 MG: 325 TABLET ORAL at 09:24

## 2024-04-19 RX ADMIN — THERA TABS 1 TABLET: TAB at 09:24

## 2024-04-19 ASSESSMENT — PAIN SCALES - GENERAL: PAINLEVEL_OUTOF10: 3

## 2024-04-19 ASSESSMENT — PAIN DESCRIPTION - ORIENTATION: ORIENTATION: RIGHT

## 2024-04-19 ASSESSMENT — PAIN DESCRIPTION - DESCRIPTORS: DESCRIPTORS: ACHING

## 2024-04-19 ASSESSMENT — PAIN DESCRIPTION - LOCATION: LOCATION: HIP

## 2024-04-19 NOTE — PROGRESS NOTES
CLINICAL PHARMACY NOTE: MEDS TO BEDS    Total # of Prescriptions Filled: 5   The following medications were delivered to the patient:  METOPROLOL TARTRATE 100MG TABS  AMIODARONE HCL 200MG TABS  HYDROCHLOROTHIAZIDE 12.5MG TABS  OMEPRAZOLE 40MG CPDR  ASPIRIN LOW DOSE 81MG CHEW     Additional Documentation: Arely RN approved to deliver medications to patient room  Patient unable to so sign due to personal condition  Amanda Thompson Pharmacy Tech

## 2024-04-19 NOTE — PLAN OF CARE
Problem: Safety - Adult  Goal: Free from fall injury  4/18/2024 2244 by Sunita Castillo, RN  Outcome: Progressing     Problem: Pain  Goal: Verbalizes/displays adequate comfort level or baseline comfort level  4/18/2024 2244 by Sunita Castillo, RN  Outcome: Progressing     Problem: Skin/Tissue Integrity  Goal: Absence of new skin breakdown  Description: 1.  Monitor for areas of redness and/or skin breakdown  2.  Assess vascular access sites hourly  3.  Every 4-6 hours minimum:  Change oxygen saturation probe site  4.  Every 4-6 hours:  If on nasal continuous positive airway pressure, respiratory therapy assess nares and determine need for appliance change or resting period.  4/18/2024 2244 by Sunita Castillo, RN  Outcome: Progressing

## 2024-04-19 NOTE — CARE COORDINATION
Case Management -  Discharge Note      Patient Name: Charline Renner                   YOB: 1940            Readmission Risk (Low < 19, Mod (19-27), High > 27): Readmission Risk Score: 16.9    Current PCP: Denny Jensen    ([facility]) Important Message from Medicare:    Date: 04/19/2024      Patient/patient representative has been educated on the benefits of Home Health Care as well as the possible risks of declining recommended services. Patient/patient representative has acknowledged the information provided and decided on the following discharge plan. Patient/ patient representative has been provided freedom of choice regarding service provider, supported by basic dialogue that supports the patient's individualized plan of care/goals.    Home Care Information:   Home Care Agency:   [address]  [place] [address]  Phone: [phone]  Fax: [phone]                Services: PT/OT/RN  Home Health Order Obtained: Yes    Home health agency notified of discharge.      Durable Medical Equipment:    Patient has been provided the following (DME) Durable Medical Equipment as recommended by therapy services.    Rolling Walker    DME Provider:  [facility]  [address] [place] [address]  Phone:  [phone]  Fax: [phone]     DME Order on file: Yes     Financial    Payor: MEDICARE / Plan: MEDICARE PART A AND B / Product Type: *No Product type* /     Pharmacy:  Potential assistance Purchasing Medications:    Meds-to-Beds request:        [facility] [id number] - [place], [place] - [address] [facility] [address] - P [phone] - F [phone]  [address] [facility] [address] [place] [address]  Phone: [phone] Fax: [phone]      Notes:    Additional Case Management Notes: The SW called [place] with [facility] and informed him the patient will be discharging home on 04/19/2024

## 2024-04-19 NOTE — PROGRESS NOTES
The son at the bed side.  Reviewed AVS and med list.  The pt and the son verbalized understanding. The pt discharged to home.

## 2024-04-19 NOTE — DISCHARGE INSTR - COC
Continuity of Care Form    Patient Name: Charline Renner   :  1940  MRN:  [id number]    Admit date:  2024  Discharge date:  24    Code Status Order: Full Code   Advance Directives:     Admitting Physician:  Lucia Deleon MD  PCP: Denny Jensen    Discharging Nurse: MARCELO HaydenN, CRRN  Discharging Hospital Unit/Room#: ARU-4903/4903-01  Discharging Unit Phone Number: [phone]    Emergency Contact:   Extended Emergency Contact Information  Primary Emergency Contact: danii renner  Home Phone: [phone]  Mobile Phone: [phone]  Relation: Child    Past Surgical History:  Past Surgical History:   Procedure Laterality Date    HIP SURGERY Right 4/3/2024    RIGHT HIP INTRAMEDULLARY NAILING performed by Manjinder Negro MD at [facility] OR       Immunization History:   Immunization History   Administered Date(s) Administered    COVID-19, PFIZER PURPLE top, DILUTE for use, (age 12 y+), 30mcg/0.3mL 2021, 2021, 10/09/2021       Active Problems:  Patient Active Problem List   Diagnosis Code    Intertrochanteric fracture of right femur, closed, initial encounter ([facility]) S72.141A    Atrial fibrillation with RVR ([facility]) I48.91    Closed fracture of femur, intertrochanteric, right, sequela S72.141S    History of gastrointestinal bleeding Z87.19    Coronary artery disease involving native coronary artery of native heart without angina pectoris I25.10    Elevated troponin R79.89    Closed right hip fracture, sequela S72.001S       Isolation/Infection:   Isolation            No Isolation          Patient Infection Status       None to display            Nurse Assessment:  Last Vital Signs: BP (!) 146/74   Pulse 65   Temp 98.4 °F (36.9 °C) (Oral)   Resp 16   Ht 1.638 m (5' 4.5\")   Wt 75.8 kg (167 lb)   SpO2 96%   BMI 28.22 kg/m²     Last documented pain score (0-10 scale): Pain Level: 0  Last Weight:   Wt Readings from Last 1 Encounters:   24 75.8 kg (167 lb)     Mental Status:  oriented,

## 2024-04-19 NOTE — PLAN OF CARE
Problem: Discharge Planning  Goal: Discharge to home or other facility with appropriate resources  Outcome: Completed     Problem: Safety - Adult  Goal: Free from fall injury  4/19/2024 1205 by Jeny Shultz RN  Outcome: Completed     Problem: Pain  Goal: Verbalizes/displays adequate comfort level or baseline comfort level  4/19/2024 1205 by Jeny Shultz RN  Outcome: Completed     Problem: ABCDS Injury Assessment  Goal: Absence of physical injury  Outcome: Completed     Problem: Skin/Tissue Integrity  Goal: Absence of new skin breakdown  Description: 1.  Monitor for areas of redness and/or skin breakdown  2.  Assess vascular access sites hourly  3.  Every 4-6 hours minimum:  Change oxygen saturation probe site  4.  Every 4-6 hours:  If on nasal continuous positive airway pressure, respiratory therapy assess nares and determine need for appliance change or resting period.  4/19/2024 1205 by Jeny Shultz RN  Outcome: Completed     Problem: Nutrition Deficit:  Goal: Optimize nutritional status  Outcome: Completed

## 2024-04-19 NOTE — CARE COORDINATION
04/19/24 0830   IMM Letter   IMM Letter given to Patient/Family/Significant other/Guardian/POA/by: IMM given to the patient. The patient is agreeable to discharge.   IMM Letter date given: 04/19/24   IMM Letter time given: 0831

## 2024-04-19 NOTE — PROGRESS NOTES
Opioid [] []   Antiplatelet [x] [x]   Hypoglycemic (including insulin) [] []   None of the above []     Special Treatments, Procedures, and Programs (THROUGHOUT LAST 3 DAYS OF STAY)    Check all of the following treatments, procedures, and programs that apply at discharge. At Discharge (check all that apply)   Cancer Treatments   A1. Chemotherapy []           A2. IV []           A3. Oral []           A10. Other []   B1. Radiation []   Respiratory Therapies   C1. Oxygen Therapy []           C2. Continuous (continuously for at least 14 hours per day) []           C3. Intermittent []           C4. High-concentration []   D1. Suctioning (Does not include oral suctioning) []           D2. Scheduled []           D3. As needed []   E1. Tracheostomy Care []   F1. Invasive Mechanical Ventilator (ventilator or respirator) []   G1. Non-invasive Mechanical Ventilator []           G2. BiPAP []           G3. CPAP []   Other   H1. IV Medications (Do not include sub Q pumps, flushes, Dextrose 50% or lactated ringers) []           H2. Vasoactive medications []           H3. Antibiotics []           H4. Anticoagulation []           H10. Other []   I1. Transfusions []   J1. Dialysis []           J2. Hemodialysis []           J3. Peritoneal dialysis []   O1. IV access (including a catheter in a vein) []           O2. Peripheral []           O3. Midline []           O4. Central (PICC, tunneled, port) []      None of the above (select if no Cancer, Respiratory, or Other boxes are checked) [x]

## 2024-05-06 PROBLEM — R79.89 ELEVATED TROPONIN: Status: RESOLVED | Noted: 2024-04-06 | Resolved: 2024-05-06

## 2024-05-28 ENCOUNTER — OFFICE VISIT (OUTPATIENT)
Dept: ORTHOPEDIC SURGERY | Age: 84
End: 2024-05-28

## 2024-05-28 VITALS — HEIGHT: 64 IN | BODY MASS INDEX: 28.51 KG/M2 | WEIGHT: 167 LBS

## 2024-05-28 DIAGNOSIS — S72.141S CLOSED FRACTURE OF FEMUR, INTERTROCHANTERIC, RIGHT, SEQUELA: Primary | ICD-10-CM

## 2024-05-28 PROCEDURE — 99024 POSTOP FOLLOW-UP VISIT: CPT | Performed by: ORTHOPAEDIC SURGERY

## 2024-05-28 NOTE — PROGRESS NOTES
DIAGNOSIS:  Right intertrochanteric femur fracture, status post Gamma nail.    DATE OF SURGERY:  4/3/24    HISTORY OF PRESENT ILLNESS: Ms. Renner 84 y.o. female  who comes in today for postoperative visit.  The patient rates pain 0/10.  She has been weight bearing as tolerated.  No numbness or tingling sensation. No fever or chills.  She is at home.  She is working with home PT.  She is still mostly using a walker.  She does start to progress to a cane.      PHYSICAL EXAMINATION:    Ht 1.626 m (5' 4\")   Wt 75.8 kg (167 lb)   BMI 28.67 kg/m²    Patient is awake, alert, and in no acute distress.  She has no pain with the active or passive range of motion of the right hip.  Right hip flexion and abduction 4/5.  She has intact sensation to light touch distally in bilateral legs.  Bilateral legs are warm and well perfused.      IMAGING:    Two views (AP and lateral) right  Hip, and AP pelvis were obtained and reviewed.  Showed anatomic alignment of the intertrochanteric fracture, Gamma nail in good position, no loosening, or hardware failure.        IMPRESSION:    8 weeks status post right Gamma nail of intertrochanteric femur fracture.      PLAN:    Continue to work on strengthening.    If she maxes out with home PT, they will give us a call for outpatient PT referral.    The patient will come back for a follow up in 2 months.  At that time, we will repeat xrays with two views right hip, and AP pelvis.            Manjinder Negro MD  Orthopaedic Surgery and Sports Medicine     Disclaimer:  This note was generated with use of a verbal recognition program and an attempt was made to check for errors.  It is possible that there are still dictated errors within this office note.  If so, please bring any significant errors to my attention for an addendum.  All efforts were made to ensure that this office note is accurate.

## 2024-07-30 ENCOUNTER — OFFICE VISIT (OUTPATIENT)
Dept: ORTHOPEDIC SURGERY | Age: 84
End: 2024-07-30
Payer: MEDICARE

## 2024-07-30 VITALS — HEIGHT: 64 IN | BODY MASS INDEX: 26.8 KG/M2 | WEIGHT: 157 LBS

## 2024-07-30 DIAGNOSIS — S72.141S CLOSED FRACTURE OF FEMUR, INTERTROCHANTERIC, RIGHT, SEQUELA: Primary | ICD-10-CM

## 2024-07-30 PROCEDURE — 99213 OFFICE O/P EST LOW 20 MIN: CPT | Performed by: ORTHOPAEDIC SURGERY

## 2024-07-30 PROCEDURE — G8427 DOCREV CUR MEDS BY ELIG CLIN: HCPCS | Performed by: ORTHOPAEDIC SURGERY

## 2024-07-30 PROCEDURE — 1036F TOBACCO NON-USER: CPT | Performed by: ORTHOPAEDIC SURGERY

## 2024-07-30 PROCEDURE — G8419 CALC BMI OUT NRM PARAM NOF/U: HCPCS | Performed by: ORTHOPAEDIC SURGERY

## 2024-07-30 PROCEDURE — 1123F ACP DISCUSS/DSCN MKR DOCD: CPT | Performed by: ORTHOPAEDIC SURGERY

## 2024-07-30 PROCEDURE — 1090F PRES/ABSN URINE INCON ASSESS: CPT | Performed by: ORTHOPAEDIC SURGERY

## 2024-07-30 NOTE — PROGRESS NOTES
DIAGNOSIS:  Right intertrochanteric femur fracture, status post Gamma nail.    DATE OF SURGERY:  4/3/24    HISTORY OF PRESENT ILLNESS: Ms. Renner 84 y.o. female  who comes in today for right hip follow up.  The patient rates pain 0/10.  She finished home PT.  She is using a walker.  She did not use any ambulatory assistive devices prior to her fracture.      PHYSICAL EXAMINATION:    Ht 1.626 m (5' 4\")   Wt 71.2 kg (157 lb)   BMI 26.95 kg/m²    Patient is awake, alert, and in no acute distress.  She has no pain with the active or passive range of motion of the right hip.  Right hip flexion and abduction 5/5.      IMAGING:    Two views (AP and lateral) right  Hip, and AP pelvis were obtained and reviewed.  Showed anatomic alignment of the intertrochanteric fracture, Gamma nail in good position, no loosening, or hardware failure.        IMPRESSION:    Almost 4 months status post right Gamma nail of intertrochanteric femur fracture.      PLAN:    Continue strengthening.    Referral for outpatient physical therapy provided.    Follow up in 3 months as needed.          Manjinder Negro MD  Orthopaedic Surgery and Sports Medicine     Disclaimer:  This note was generated with use of a verbal recognition program and an attempt was made to check for errors.  It is possible that there are still dictated errors within this office note.  If so, please bring any significant errors to my attention for an addendum.  All efforts were made to ensure that this office note is accurate.

## (undated) DEVICE — 6619 2 PTNT ISO SYS INCISE AREA&LT;(&GT;&&LT;)&GT;P: Brand: STERI-DRAPE™ IOBAN™ 2

## (undated) DEVICE — ENDOPATH XCEL UNIVERSAL TROCAR STABLILITY SLEEVES: Brand: ENDOPATH XCEL

## (undated) DEVICE — DRESSING FOAM W3XL3IN GENTLE SIL FACE BORD ADH PD SUP ABSRB

## (undated) DEVICE — PAD GRND REM POLYHESIVE A/ DISP

## (undated) DEVICE — ENDOCUT SCISSOR TIP, DISPOSABLE: Brand: RENEW

## (undated) DEVICE — APPL CHLORAPREP W/TINT 26ML ORNG

## (undated) DEVICE — SUT MNCRYL 4/0 PS2 18 IN

## (undated) DEVICE — SUTURE VCRL + SZ 2-0 L27IN ABSRB CLR CT-1 1/2 CIR TAPERCUT VCP259H

## (undated) DEVICE — GOWN SIRUS NONREIN XL W/TWL: Brand: MEDLINE INDUSTRIES, INC.

## (undated) DEVICE — SUT VIC 0/0 UR6 27IN DYED J603H

## (undated) DEVICE — [HIGH FLOW INSUFFLATOR,  DO NOT USE IF PACKAGE IS DAMAGED,  KEEP DRY,  KEEP AWAY FROM SUNLIGHT,  PROTECT FROM HEAT AND RADIOACTIVE SOURCES.]: Brand: PNEUMOSURE

## (undated) DEVICE — DRESSING,GAUZE,XEROFORM,CURAD,1"X8",ST: Brand: CURAD

## (undated) DEVICE — GLV SURG SENSICARE MICRO PF LF 8 STRL

## (undated) DEVICE — DRN WND HUBLSS FLUT FULL PERF SIL10MM

## (undated) DEVICE — PRECISION PIN TAPERED: Brand: GAMMA

## (undated) DEVICE — PACK PROCEDURE SURG EXTREMITY MFFOP CUST

## (undated) DEVICE — LOCKING DRILL

## (undated) DEVICE — ENDOPOUCH RETRIEVER SPECIMEN RETRIEVAL BAGS: Brand: ENDOPOUCH RETRIEVER

## (undated) DEVICE — COR LAP CHOLE: Brand: MEDLINE INDUSTRIES, INC.

## (undated) DEVICE — LOCKING SCALPEL

## (undated) DEVICE — SPONGE LAP W18XL18IN WHT COT 4 PLY FLD STRUNG RADPQ DISP ST 2 PER PACK

## (undated) DEVICE — SOLUTION IRRIG 1000ML 0.9% SOD CHL USP POUR PLAS BTL

## (undated) DEVICE — Device

## (undated) DEVICE — HOLDER: Brand: DEROYAL

## (undated) DEVICE — HYPODERMIC SAFETY NEEDLE: Brand: MAGELLAN

## (undated) DEVICE — SKIN AFFIX SURG ADHESIVE 72/CS 0.55ML: Brand: MEDLINE

## (undated) DEVICE — GLOVE ORANGE PI 7 1/2   MSG9075

## (undated) DEVICE — SUTURE VCRL + SZ 0 L27IN ABSRB UD CT-1 L36MM 1/2 CIR TAPR VCP260H

## (undated) DEVICE — INSUFFLATION NEEDLE TO ESTABLISH PNEUMOPERITONEUM.: Brand: INSUFFLATION NEEDLE

## (undated) DEVICE — ENDOPATH XCEL BLADELESS TROCARS WITH STABILITY SLEEVES: Brand: ENDOPATH XCEL

## (undated) DEVICE — SYRINGE 10CC LUER LOCK: Brand: CARDINAL HEALTH

## (undated) DEVICE — ENCORE® LATEX MICRO SIZE 7.5, STERILE LATEX POWDER-FREE SURGICAL GLOVE: Brand: ENCORE

## (undated) DEVICE — REAMER SHAFT, MOD.TRINKLE: Brand: BIXCUT

## (undated) DEVICE — 2, DISPOSABLE SUCTION/IRRIGATOR WITH DISPOSABLE TIP: Brand: STRYKEFLOW

## (undated) DEVICE — GUIDE WIRE

## (undated) DEVICE — JACKSON-PRATT 100CC BULB RESERVOIR: Brand: CARDINAL HEALTH